# Patient Record
Sex: MALE | Race: WHITE | NOT HISPANIC OR LATINO | Employment: OTHER | URBAN - METROPOLITAN AREA
[De-identification: names, ages, dates, MRNs, and addresses within clinical notes are randomized per-mention and may not be internally consistent; named-entity substitution may affect disease eponyms.]

---

## 2017-08-02 DIAGNOSIS — G47.00 INSOMNIA, UNSPECIFIED TYPE: ICD-10-CM

## 2017-08-02 RX ORDER — TRAZODONE HYDROCHLORIDE 50 MG/1
TABLET, FILM COATED ORAL
Qty: 30 TABLET | Refills: 0 | Status: SHIPPED | OUTPATIENT
Start: 2017-08-02 | End: 2017-11-17

## 2017-08-02 NOTE — TELEPHONE ENCOUNTER
traZODone (DESYREL) 50 MG tablet      Last Written Prescription Date: 9/2/2016  Last Fill Quantity: 90,  # refills: 3   Last Office Visit with [facility], [facility] or [facility] prescribing provider: 9/1/2016    Prescription approved per [facility] Refill Protocol.    Signed Prescriptions:                        Disp   Refills    traZODone (DESYREL) 50 MG tablet           30 tab*0        Sig: TAKE ONE TABLET BY MOUTH AT BEDTIME AS NEEDED FOR           SLEEP   Authorizing Provider: TIERRA LAND  Ordering User: ROSELYN HENDRIX      Closing encounter - no further actions needed at this time    Roselyn Hendrix RN

## 2017-08-03 DIAGNOSIS — G47.00 INSOMNIA, UNSPECIFIED TYPE: ICD-10-CM

## 2017-08-03 RX ORDER — TRAZODONE HYDROCHLORIDE 50 MG/1
TABLET, FILM COATED ORAL
Qty: 90 TABLET | Refills: 2 | OUTPATIENT
Start: 2017-08-03

## 2017-08-03 NOTE — TELEPHONE ENCOUNTER
Trazodone refilled on 8/2/17.    This is duplicate request.    Med refused.  DANETTE Pa, BSN, RN

## 2017-08-31 DIAGNOSIS — F32.5 MAJOR DEPRESSION IN COMPLETE REMISSION (H): ICD-10-CM

## 2017-08-31 DIAGNOSIS — I10 BENIGN ESSENTIAL HYPERTENSION: ICD-10-CM

## 2017-09-01 RX ORDER — HYDROCHLOROTHIAZIDE 25 MG/1
TABLET ORAL
Qty: 90 TABLET | Refills: 0 | Status: SHIPPED | OUTPATIENT
Start: 2017-09-01 | End: 2017-11-17

## 2017-09-01 RX ORDER — ATENOLOL 50 MG/1
TABLET ORAL
Qty: 90 TABLET | Refills: 0 | Status: SHIPPED | OUTPATIENT
Start: 2017-09-01 | End: 2017-11-17

## 2017-09-01 RX ORDER — LOSARTAN POTASSIUM 100 MG/1
TABLET ORAL
Qty: 90 TABLET | Refills: 0 | Status: SHIPPED | OUTPATIENT
Start: 2017-09-01 | End: 2017-11-17

## 2017-09-01 RX ORDER — DILTIAZEM HYDROCHLORIDE 300 MG/1
CAPSULE, EXTENDED RELEASE ORAL
Qty: 90 CAPSULE | Refills: 0 | Status: SHIPPED | OUTPATIENT
Start: 2017-09-01 | End: 2017-11-17

## 2017-09-01 NOTE — TELEPHONE ENCOUNTER
Routing refill request to provider for review/approval because:  Labs not current:  Last labs 5/6/2014  Patient needs to be seen because it has been more than 1 year since last office visit.

## 2017-09-01 NOTE — TELEPHONE ENCOUNTER
Medication Detail      Disp Refills Start End RAFFY   losartan (COZAAR) 100 MG tablet 90 tablet 3 9/2/2016  No   Sig: Take 1 tablet (100 mg) by mouth daily       Last Office Visit with [facility] or [facility] prescribing provider: 9-1-16       No results found for: POTASSIUM  No results found for: CR  BP Readings from Last 3 Encounters:   09/01/16 142/87         Medication Detail      Disp Refills Start End RAFFY   atenolol (TENORMIN) 50 MG tablet 90 tablet 3 9/2/2016  No   Sig: Take 1 tablet (50 mg) by mouth sumanth         Last Office Visit with [facility], [facility] or [facility] prescribing provider:  9-1-16   Future Office Visit:        BP Readings from Last 3 Encounters:   09/01/16 142/87         Medication Detail      Disp Refills Start End RAFFY   sertraline (ZOLOFT) 50 MG tablet 90 tablet 3 9/2/2016  No   Sig: Take 1 tablet (50 mg) by mouth daily     Last Office Visit with [facility] primary care provider:  9-1-16        Last PHQ-9 score on record= No flowsheet data found.          Medication Detail      Disp Refills Start End RAFFY   hydrochlorothiazide (HYDRODIURIL) 25 MG tablet 90 tablet 3 9/2/2016  No   Sig: Take 1 tablet (25 mg) by mouth daily       Last Office Visit with [facility] or [facility] prescribing provider: 9-1-17       No results found for: POTASSIUM  No results found for: CR  BP Readings from Last 3 Encounters:   09/01/16 142/87           Medication Detail      Disp Refills Start End RAFFY   diltiazem (TIAZAC) 300 MG 24 hr ER beaded capsule 90 capsule 3 9/2/2016  No   Sig: Take 1 capsule (300 mg) by mouth daily        Last Office Visit with [facility], [facility] or  [facility] prescribing provider:  9-1-17   Future Office Visit:      BP Readings from Last 3 Encounters:   09/01/16 142/87     No results found for: ALT  Lab Results   Component Value Date    CHOL 228 05/06/2014     Lab Results   Component Value Date    HDL 45 05/06/2014     Lab Results   Component Value Date     05/06/2014     Lab Results   Component Value Date    TRIG 291  05/06/2014     No results found for: STEPHANIA

## 2017-11-17 ENCOUNTER — OFFICE VISIT (OUTPATIENT)
Dept: FAMILY MEDICINE | Facility: CLINIC | Age: 62
End: 2017-11-17

## 2017-11-17 VITALS
HEIGHT: 68 IN | BODY MASS INDEX: 28.4 KG/M2 | RESPIRATION RATE: 18 BRPM | WEIGHT: 187.4 LBS | SYSTOLIC BLOOD PRESSURE: 152 MMHG | TEMPERATURE: 97.8 F | DIASTOLIC BLOOD PRESSURE: 91 MMHG

## 2017-11-17 DIAGNOSIS — Z12.5 SCREENING FOR PROSTATE CANCER: ICD-10-CM

## 2017-11-17 DIAGNOSIS — Z23 NEED FOR PROPHYLACTIC VACCINATION AND INOCULATION AGAINST INFLUENZA: ICD-10-CM

## 2017-11-17 DIAGNOSIS — N52.9 ERECTILE DYSFUNCTION, UNSPECIFIED ERECTILE DYSFUNCTION TYPE: ICD-10-CM

## 2017-11-17 DIAGNOSIS — I10 BENIGN ESSENTIAL HYPERTENSION: ICD-10-CM

## 2017-11-17 DIAGNOSIS — E78.5 HYPERLIPIDEMIA LDL GOAL <100: ICD-10-CM

## 2017-11-17 DIAGNOSIS — H53.8 BLURRED VISION: ICD-10-CM

## 2017-11-17 DIAGNOSIS — Z00.00 ROUTINE GENERAL MEDICAL EXAMINATION AT A HEALTH CARE FACILITY: Primary | ICD-10-CM

## 2017-11-17 DIAGNOSIS — Z13.1 SCREENING FOR DIABETES MELLITUS: ICD-10-CM

## 2017-11-17 DIAGNOSIS — F32.5 MAJOR DEPRESSION IN COMPLETE REMISSION (H): ICD-10-CM

## 2017-11-17 DIAGNOSIS — G47.00 INSOMNIA, UNSPECIFIED TYPE: ICD-10-CM

## 2017-11-17 DIAGNOSIS — Z11.59 NEED FOR HEPATITIS C SCREENING TEST: ICD-10-CM

## 2017-11-17 LAB — HBA1C MFR BLD: 5.4 % (ref 4.3–6)

## 2017-11-17 PROCEDURE — 83036 HEMOGLOBIN GLYCOSYLATED A1C: CPT | Performed by: FAMILY MEDICINE

## 2017-11-17 PROCEDURE — 90686 IIV4 VACC NO PRSV 0.5 ML IM: CPT | Performed by: FAMILY MEDICINE

## 2017-11-17 PROCEDURE — 36415 COLL VENOUS BLD VENIPUNCTURE: CPT | Performed by: FAMILY MEDICINE

## 2017-11-17 PROCEDURE — 99207 C PAF COMPLETED  NO CHARGE: CPT | Performed by: FAMILY MEDICINE

## 2017-11-17 PROCEDURE — 90471 IMMUNIZATION ADMIN: CPT | Performed by: FAMILY MEDICINE

## 2017-11-17 PROCEDURE — 86803 HEPATITIS C AB TEST: CPT | Performed by: FAMILY MEDICINE

## 2017-11-17 PROCEDURE — 99207 C PAF COMPLETED  NO CHARGE: CPT | Mod: 25 | Performed by: FAMILY MEDICINE

## 2017-11-17 PROCEDURE — 80061 LIPID PANEL: CPT | Performed by: FAMILY MEDICINE

## 2017-11-17 PROCEDURE — 80053 COMPREHEN METABOLIC PANEL: CPT | Performed by: FAMILY MEDICINE

## 2017-11-17 PROCEDURE — G0103 PSA SCREENING: HCPCS | Performed by: FAMILY MEDICINE

## 2017-11-17 PROCEDURE — 99396 PREV VISIT EST AGE 40-64: CPT | Mod: 25 | Performed by: FAMILY MEDICINE

## 2017-11-17 RX ORDER — TRAZODONE HYDROCHLORIDE 50 MG/1
TABLET, FILM COATED ORAL
Qty: 30 TABLET | Refills: 0 | Status: CANCELLED | OUTPATIENT
Start: 2017-11-17

## 2017-11-17 RX ORDER — DILTIAZEM HYDROCHLORIDE 300 MG/1
300 CAPSULE, EXTENDED RELEASE ORAL DAILY
Qty: 90 CAPSULE | Refills: 3 | Status: SHIPPED | OUTPATIENT
Start: 2017-11-17 | End: 2018-11-19

## 2017-11-17 RX ORDER — LOSARTAN POTASSIUM 100 MG/1
100 TABLET ORAL DAILY
Qty: 90 TABLET | Refills: 3 | Status: SHIPPED | OUTPATIENT
Start: 2017-11-17 | End: 2018-11-19

## 2017-11-17 RX ORDER — SIMVASTATIN 10 MG
10 TABLET ORAL
Qty: 30 TABLET | Status: CANCELLED | OUTPATIENT
Start: 2017-11-17

## 2017-11-17 RX ORDER — SIMVASTATIN 10 MG
10 TABLET ORAL
COMMUNITY
Start: 2017-05-23 | End: 2017-11-17

## 2017-11-17 RX ORDER — HYDROCHLOROTHIAZIDE 25 MG/1
25 TABLET ORAL DAILY
Qty: 90 TABLET | Refills: 3 | Status: SHIPPED | OUTPATIENT
Start: 2017-11-17 | End: 2018-11-19

## 2017-11-17 RX ORDER — TRAZODONE HYDROCHLORIDE 50 MG/1
50 TABLET, FILM COATED ORAL AT BEDTIME
Qty: 90 TABLET | Refills: 3 | Status: SHIPPED | OUTPATIENT
Start: 2017-11-17 | End: 2018-07-26

## 2017-11-17 RX ORDER — ATENOLOL 50 MG/1
50 TABLET ORAL DAILY
Qty: 90 TABLET | Refills: 3 | Status: SHIPPED | OUTPATIENT
Start: 2017-11-17 | End: 2018-11-19

## 2017-11-17 RX ORDER — LOSARTAN POTASSIUM 100 MG/1
100 TABLET ORAL
COMMUNITY
Start: 2016-08-02 | End: 2017-11-17

## 2017-11-17 RX ORDER — SIMVASTATIN 10 MG
10 TABLET ORAL AT BEDTIME
Qty: 90 TABLET | Refills: 3 | Status: SHIPPED | OUTPATIENT
Start: 2017-11-17 | End: 2018-11-19

## 2017-11-17 RX ORDER — SILDENAFIL 50 MG/1
25-100 TABLET, FILM COATED ORAL DAILY PRN
Qty: 6 TABLET | Refills: 1 | Status: SHIPPED | OUTPATIENT
Start: 2017-11-17 | End: 2018-11-19

## 2017-11-17 NOTE — PROGRESS NOTES

## 2017-11-17 NOTE — PROGRESS NOTES
SUBJECTIVE:   CC: Ming Garnett is an 61 year old male who presents for preventative health visit.     Physical   Annual:     Getting at least 3 servings of Calcium per day::  Yes    Bi-annual eye exam::  Yes    Dental care twice a year::  Yes    Sleep apnea or symptoms of sleep apnea::  None    Taking medications regularly::  No    Barriers to taking medications::  None    Additional concerns today::  No    Physical Activity: Just bought a house. He is very active fixing that. Not swimming so much right now.  Nutrition: Tries to eat the best he can.       Today's PHQ-2 Score:   PHQ-2 ( 1999 [employer]) 11/17/2017   Q1: Little interest or pleasure in doing things 0   Q2: Feeling down, depressed or hopeless 0   PHQ-2 Score 0   Q1: Little interest or pleasure in doing things Not at all   Q2: Feeling down, depressed or hopeless Not at all   PHQ-2 Score 0       Abuse: Current or Past(Physical, Sexual or Emotional)- No  Do you feel safe in your environment - Yes    Social History   Substance Use Topics     Smoking status: Current Some Day Smoker     Packs/day: 0.50     Types: Cigarettes     Smokeless tobacco: Never Used     Alcohol use 0.0 oz/week     0 Standard drinks or equivalent per week          Standardized Alcohol Screening Questionnaire  AUDIT   Questions 0 1 2 3 4 Score   1. How often do you have a drink  containing alcohol? Never Monthly or less 2 to 4  times a  month 2 to 3  times a  week 4 or more  times a  week  4   2. How many drinks containing alcohol  do you have on a typical day when you are drinking? 1 or 2 3 or 4 5 or 6 7 to 9 10 or more  0   3. How often do you have more than five  or more drinks on one occasion? Never Less  than  monthly Monthly Weekly Daily or  almost  daily  0   4. How often during the last year have  you found that you were not able to stop drinking once you had started? Never Less  than  monthly Monthly Weekly Daily or  almost  daily    0   5. How often during the last year  have  you failed to do what was normally expected of you because of drinking? Never Less  than  monthly Monthly Weekly Daily or  almost  daily  0   6. How often during the last year have  you needed a first drink in the morning to get yourself going after a heavy drinking session? Never Less  than  monthly Monthly Weekly Daily or  almost  daily 0   7. How often during the last year have you had a feeling of guilt or remorse after drinking? Never Less  than  monthly Monthly Weekly Daily or  almost  daily  0   8. How often during the last year have  you been unable to remember what happened the night before because of your drinking? Never Less  than  monthly Monthly Weekly Daily or  almost  daily  0   9. Have you or someone else been  injured because of your drinking? No  Yes, but not in the last year  Yes,  during the  last year  0   10. Has a relative, friend, doctor or other health care worker been concerned about your drinking or suggested you cut down? No  Yes, but not in the last year  Yes,  during the  last year  4   Total  8   Scoring: A score of 7 for adult men is an indication of hazardous drinking (risk for physical or physiological harm); a score of 8 or more is an indication of an alcohol use disorder. A score of 5 or more for adult women  is an indication of hazardous drinking or an alcohol use disorder.         Last PSA: No results found for: PSA      Review of Systems  C: NEGATIVE for fever, chills, change in weight  I: NEGATIVE for worrisome rashes, moles or lesions  E: NEGATIVE for vision changes or irritation  ENT: NEGATIVE for ear, mouth and throat problems  R: NEGATIVE for significant cough or SOB  CV: NEGATIVE for chest pain, palpitations or peripheral edema  GI: NEGATIVE for nausea, abdominal pain, or change in bowel habits - occasional diarrhea, occasional heartburn   male: negative for dysuria, hematuria, decreased urinary stream, erectile dysfunction, urethral discharge  M: Ongoing knee  "ache. Sees a chiropractor for shoulder, back, knee, and hand discomfort. Coping OK.  N: NEGATIVE for weakness, dizziness. Some tingling in right arm that responds to exercises - attributed to pinched nerve in neck.  P: NEGATIVE for changes in mood or affect    OBJECTIVE:   BP (!) 152/91  Temp 97.8  F (36.6  C) (Oral)  Resp 18  Ht 5' 7.5\" (1.715 m)  Wt 187 lb 6.4 oz (85 kg)  BMI 28.92 kg/m2    Physical Exam  GENERAL: healthy, alert and no distress  EYES: Eyes grossly normal to inspection, PERRL and conjunctivae and sclerae normal  HENT: ear canals and TM's normal, nose and mouth without ulcers or lesions  NECK: no adenopathy, no asymmetry, masses, or scars and thyroid normal to palpation  RESP: lungs clear to auscultation - no rales, rhonchi or wheezes  CV: regular rate and rhythm, normal S1 S2, no S3 or S4, no murmur, click or rub, no peripheral edema and peripheral pulses strong  ABDOMEN: soft, nontender, no hepatosplenomegaly, no masses and bowel sounds normal  MS: no gross musculoskeletal defects noted, no edema  SKIN: no suspicious lesions or rashes  NEURO: Normal strength and tone, mentation intact and speech normal  PSYCH: mentation appears normal, affect normal/bright    ASSESSMENT/PLAN:   Ming was seen today for physical and flu shot.    Diagnoses and all orders for this visit:    Routine general medical examination at a health care facility    Insomnia, unspecified type  -     traZODone (DESYREL) 50 MG tablet; Take 1 tablet (50 mg) by mouth At Bedtime    Benign essential hypertension  -     hydrochlorothiazide (HYDRODIURIL) 25 MG tablet; Take 1 tablet (25 mg) by mouth daily  -     diltiazem (TIAZAC) 300 MG 24 hr ER beaded capsule; Take 1 capsule (300 mg) by mouth daily  -     atenolol (TENORMIN) 50 MG tablet; Take 1 tablet (50 mg) by mouth daily  -     losartan (COZAAR) 100 MG tablet; Take 1 tablet (100 mg) by mouth daily  -     Comprehensive metabolic panel (BMP + Alb, Alk Phos, ALT, AST, Total. " "Evelyn, PAT)    Major depression in complete remission (H)  -     sertraline (ZOLOFT) 50 MG tablet; Take 1 tablet (50 mg) by mouth daily    Erectile dysfunction, unspecified erectile dysfunction type  -     sildenafil (VIAGRA) 50 MG tablet; Take 0.5-2 tablets ( mg) by mouth daily as needed Take 30 min to 4 hours before intercourse.  Never use with nitroglycerin, terazosin or doxazosin.    Hyperlipidemia LDL goal <100  -     Lipid panel reflex to direct LDL Fasting  -     simvastatin (ZOCOR) 10 MG tablet; Take 1 tablet (10 mg) by mouth At Bedtime    Screening for prostate cancer  -     PSA, screen    Need for hepatitis C screening test  -     Hepatitis C antibody    Blurred vision    Screening for diabetes mellitus  -     Hemoglobin A1c    Need for prophylactic vaccination and inoculation against influenza  -     FLU VAC, SPLIT VIRUS IM > 3 YO (QUADRIVALENT) [35878]  -     Vaccine Administration, Initial [54173]    Other orders  -     Cancel: GLUCOSE  -     Cancel: simvastatin (ZOCOR) 10 MG tablet; Take 1 tablet (10 mg) by mouth  -     Cancel: traZODone (DESYREL) 50 MG tablet;   -     PAF COMPLETED        COUNSELING:   Reviewed preventive health counseling, as reflected in patient instructions       Regular exercise       Healthy diet/nutrition     Recheck BP in 1 month. This could be a nurse only visit.  Will request colonoscopy.       reports that he has been smoking Cigarettes.  He has been smoking about 0.50 packs per day. He has never used smokeless tobacco.    Estimated body mass index is 28.92 kg/(m^2) as calculated from the following:    Height as of this encounter: 5' 7.5\" (1.715 m).    Weight as of this encounter: 187 lb 6.4 oz (85 kg).       Counseling Resources:  ATP IV Guidelines  Pooled Cohorts Equation Calculator  FRAX Risk Assessment  ICSI Preventive Guidelines  Dietary Guidelines for Americans, 2010  [employer] MyPlate  ASA Prophylaxis  Lung CA Screening    Jarrett Jerome MD  [facility]  Answers for HPI/ROS submitted by the patient on 11/17/2017   PHQ-2 Score: 0

## 2017-11-17 NOTE — LETTER
November 21, 2017      Ming Garnett  [address] [place] [address]        Dear ,    We are writing to inform you of your test results.    I am writing to share your lab results from your recent visit at our office. They are all in a fair range. Your creatinine which is a kidney function test is a bit elevated. It is prudent to consider retesting this in the next 1-6 months to be sure it is stable - on the sooner end if you are not feeling well.    The cholesterol results are most useful when factored into the risk score noted below. A risk score below 10% indicates that you are in a good range and that we should not add any medications relating to heart health or cholesterol levels. It is always a good idea to be attentive to physical activity and nutrition as we discussed. It is true that some of the cholesterol levels appear out of range when compared to the lab reference ranges, but this is no longer an accurate way of interpreting cholesterol levels.    We should consider adding aspirin 81mg daily to your medications as a result.    The 10-year ASCVD risk score (Bry KALEB Jr, et al., 2013) is: 25.2%    Values used to calculate the score:      Age: 61 years      Sex: Male      Is Non- : No      Diabetic: No      Tobacco smoker: Yes      Systolic Blood Pressure: 152 mmHg      Is BP treated: Yes      HDL Cholesterol: 65 mg/dL      Total Cholesterol: 263 mg/dL     Resulted Orders   PSA, screen   Result Value Ref Range    PSA 2.31 0 - 4 ug/L      Comment:      Assay Method:  Chemiluminescence using Siemens Vista analyzer   Lipid panel reflex to direct LDL Fasting   Result Value Ref Range    Cholesterol 263 (H) <200 mg/dL      Comment:      Desirable:       <200 mg/dl    Triglycerides 159 (H) <150 mg/dL      Comment:      Borderline high:  150-199 mg/dl  High:             200-499 mg/dl  Very high:       >499 mg/dl  Fasting specimen      HDL Cholesterol 65 >39 mg/dL    LDL  Cholesterol Calculated 166 (H) <100 mg/dL      Comment:      Above desirable:  100-129 mg/dl  Borderline High:  130-159 mg/dL  High:             160-189 mg/dL  Very high:       >189 mg/dl      Non HDL Cholesterol 198 (H) <130 mg/dL      Comment:      Above Desirable:  130-159 mg/dl  Borderline high:  160-189 mg/dl  High:             190-219 mg/dl  Very high:       >219 mg/dl     Hepatitis C antibody   Result Value Ref Range    Hepatitis C Antibody Nonreactive NR^Nonreactive      Comment:      Assay performance characteristics have not been established for newborns,   infants, and children     Hemoglobin A1c   Result Value Ref Range    Hemoglobin A1C 5.4 4.3 - 6.0 %   Comprehensive metabolic panel (BMP + Alb, Alk Phos, ALT, AST, Total. Bili, TP)   Result Value Ref Range    Sodium 137 133 - 144 mmol/L    Potassium 4.2 3.4 - 5.3 mmol/L    Chloride 104 94 - 109 mmol/L    Carbon Dioxide 25 20 - 32 mmol/L    Anion Gap 8 3 - 14 mmol/L    Glucose 94 70 - 99 mg/dL      Comment:      Fasting specimen    Urea Nitrogen 23 7 - 30 mg/dL    Creatinine 1.29 (H) 0.66 - 1.25 mg/dL    GFR Estimate 56 (L) >60 mL/min/1.7m2      Comment:      Non  GFR Calc    GFR Estimate If Black 68 >60 mL/min/1.7m2      Comment:       GFR Calc    Calcium 9.8 8.5 - 10.1 mg/dL    Bilirubin Total 0.6 0.2 - 1.3 mg/dL    Albumin 4.3 3.4 - 5.0 g/dL    Protein Total 8.2 6.8 - 8.8 g/dL    Alkaline Phosphatase 96 40 - 150 U/L    ALT 35 0 - 70 U/L    AST 27 0 - 45 U/L       If you have any questions or concerns, please call the clinic at the number listed above.       Sincerely,        Jarrett Jerome MD/nr

## 2017-11-17 NOTE — Clinical Note
Please request records from [facility] for past colonoscopy. Records request already on file.  Jarrett Jerome MD Family Medicine Physician

## 2017-11-17 NOTE — MR AVS SNAPSHOT
After Visit Summary   11/17/2017    Ming Garnett    MRN: [id number]           Patient Information     Date Of Birth          1955        Visit Information        Provider Department      11/17/2017 1:00 PM Jarrett Montague MD [facility]        Today's Diagnoses     Hyperlipidemia LDL goal <100    -  1    Insomnia, unspecified type        Benign essential hypertension        Major depression in complete remission (H)        Erectile dysfunction, unspecified erectile dysfunction type        Screening for prostate cancer        Need for hepatitis C screening test        Blurred vision        Screening for diabetes mellitus        Need for prophylactic vaccination and inoculation against influenza          Care Instructions      Preventive Health Recommendations  Male Ages 50 - 64    Yearly exam:             See your health care provider every year in order to  o   Review health changes.   o   Discuss preventive care.    o   Review your medicines if your doctor has prescribed any.     Have a cholesterol test every 5 years, or more frequently if you are at risk for high cholesterol/heart disease.     Have a diabetes test (fasting glucose) every three years. If you are at risk for diabetes, you should have this test more often.     Have a colonoscopy at age 50, or have a yearly FIT test (stool test). These exams will check for colon cancer.      Talk with your health care provider about whether or not a prostate cancer screening test (PSA) is right for you.    You should be tested each year for STDs (sexually transmitted diseases), if you re at risk.     Shots: Get a flu shot each year. Get a tetanus shot every 10 years.     Nutrition:    Eat at least 5 servings of fruits and vegetables daily.     Eat whole-grain bread, whole-wheat pasta and brown rice instead of white grains and rice.     Talk to your provider about Calcium and Vitamin D.     Lifestyle    Exercise  "for at least 150 minutes a week (30 minutes a day, 5 days a week). This will help you control your weight and prevent disease.     Limit alcohol to one drink per day.     No smoking.     Wear sunscreen to prevent skin cancer.     See your dentist every six months for an exam and cleaning.     See your eye doctor every 1 to 2 years.  Recheck BP in 1 month. This could be a nurse only visit.  Will request colonoscopy.          Follow-ups after your visit        Who to contact     If you have questions or need follow up information about today's clinic visit or your schedule please contact [facility] directly at [phone].  Normal or non-critical lab and imaging results will be communicated to you by [employer], letter or phone within 4 business days after the clinic has received the results. If you do not hear from us within 7 days, please contact the clinic through [employer] or phone. If you have a critical or abnormal lab result, we will notify you by phone as soon as possible.  Submit refill requests through [employer] or call your pharmacy and they will forward the refill request to us. Please allow 3 business days for your refill to be completed.          Additional Information About Your Visit        [employer] Information     [employer] lets you send messages to your doctor, view your test results, renew your prescriptions, schedule appointments and more. To sign up, go to [email or web address] . Click on \"Log in\" on the left side of the screen, which will take you to the Welcome page. Then click on \"Sign up Now\" on the right side of the page.     You will be asked to enter the access code listed below, as well as some personal information. Please follow the directions to create your username and password.     Your access code is: [email or web address]  Expires: 2/15/2018  1:56 PM     Your access code will  in 90 days. If you need help or a new code, please call your [facility] or [phone].      " "  Care EveryWhere ID     This is your Care EveryWhere ID. This could be used by other organizations to access your [facility] medical records  [id number]        Your Vitals Were     Temperature Respirations Height BMI (Body Mass Index)          97.8  F (36.6  C) (Oral) 18 5' 7.5\" (1.715 m) 28.92 kg/m2         Blood Pressure from Last 3 Encounters:   11/17/17 (!) 152/91   09/01/16 142/87    Weight from Last 3 Encounters:   11/17/17 187 lb 6.4 oz (85 kg)   09/01/16 196 lb (88.9 kg)              We Performed the Following     Comprehensive metabolic panel (BMP + Alb, Alk Phos, ALT, AST, Total. Bili, TP)     FLU VAC, SPLIT VIRUS IM > 3 YO (QUADRIVALENT) [92570]     Hemoglobin A1c     Hepatitis C antibody     Lipid panel reflex to direct LDL Fasting     PSA, screen     Vaccine Administration, Initial [74015]          Today's Medication Changes          These changes are accurate as of: 11/17/17  1:56 PM.  If you have any questions, ask your nurse or doctor.               These medicines have changed or have updated prescriptions.        Dose/Directions    atenolol 50 MG tablet   Commonly known as:  TENORMIN   This may have changed:  See the new instructions.   Used for:  Benign essential hypertension   Changed by:  Jarrett Montague MD        Dose:  50 mg   Take 1 tablet (50 mg) by mouth daily   Quantity:  90 tablet   Refills:  3       diltiazem 300 MG 24 hr ER beaded capsule   Commonly known as:  TIAZAC   This may have changed:  See the new instructions.   Used for:  Benign essential hypertension   Changed by:  Jarrett Montague MD        Dose:  300 mg   Take 1 capsule (300 mg) by mouth daily   Quantity:  90 capsule   Refills:  3       hydrochlorothiazide 25 MG tablet   Commonly known as:  HYDRODIURIL   This may have changed:  See the new instructions.   Used for:  Benign essential hypertension   Changed by:  Jarrett Montague MD        Dose:  25 mg   Take 1 tablet (25 mg) by mouth " daily   Quantity:  90 tablet   Refills:  3       losartan 100 MG tablet   Commonly known as:  COZAAR   This may have changed:  See the new instructions.   Used for:  Benign essential hypertension   Changed by:  Jarrett Montague MD        Dose:  100 mg   Take 1 tablet (100 mg) by mouth daily   Quantity:  90 tablet   Refills:  3       sertraline 50 MG tablet   Commonly known as:  ZOLOFT   This may have changed:  See the new instructions.   Used for:  Major depression in complete remission (H)   Changed by:  Jarrett Montague MD        Dose:  50 mg   Take 1 tablet (50 mg) by mouth daily   Quantity:  90 tablet   Refills:  3       sildenafil 50 MG tablet   Commonly known as:  VIAGRA   This may have changed:  reasons to take this   Used for:  Erectile dysfunction, unspecified erectile dysfunction type   Changed by:  Jarrett Montague MD        Dose:   mg   Take 0.5-2 tablets ( mg) by mouth daily as needed Take 30 min to 4 hours before intercourse.  Never use with nitroglycerin, terazosin or doxazosin.   Quantity:  6 tablet   Refills:  1       simvastatin 10 MG tablet   Commonly known as:  ZOCOR   This may have changed:  when to take this   Used for:  Hyperlipidemia LDL goal <100   Changed by:  Jarrett Montague MD        Dose:  10 mg   Take 1 tablet (10 mg) by mouth At Bedtime   Quantity:  90 tablet   Refills:  3       traZODone 50 MG tablet   Commonly known as:  DESYREL   This may have changed:  See the new instructions.   Used for:  Insomnia, unspecified type   Changed by:  Jarrett Montague MD        Dose:  50 mg   Take 1 tablet (50 mg) by mouth At Bedtime   Quantity:  90 tablet   Refills:  3            Where to get your medicines      These medications were sent to [id number] - [place], [place] - [address] [facility]  [address], [place] [address]     Phone:  [phone]     atenolol 50 MG tablet    diltiazem 300 MG 24  hr ER beaded capsule    hydrochlorothiazide 25 MG tablet    losartan 100 MG tablet    sertraline 50 MG tablet    simvastatin 10 MG tablet    traZODone 50 MG tablet         Some of these will need a paper prescription and others can be bought over the counter.  Ask your nurse if you have questions.     Bring a paper prescription for each of these medications     sildenafil 50 MG tablet                Primary Care Provider Office Phone # Fax #    Jarrett Pandey Dom Jerome [phone]       [address] [facility] [address]        Equal Access to Services     LIZZY STANLEY : Hadii aad ku hadasho Soomaali, waaxda luqadaha, qaybta kaalmada adeegyada, waxay idiin hayaan adebianca grijalva . So [facility] [phone].    ATENCIÓN: Si habla español, tiene a mao disposición servicios gratuitos de asistencia lingüística. [facility] [phone].    We comply with applicable federal civil rights laws and [place] laws. We do not discriminate on the basis of race, color, national origin, age, disability, sex, sexual orientation, or gender identity.            Thank you!     Thank you for choosing [facility]  for your care. Our goal is always to provide you with excellent care. Hearing back from our patients is one way we can continue to improve our services. Please take a few minutes to complete the written survey that you may receive in the mail after your visit with us. Thank you!             Your Updated Medication List - Protect others around you: Learn how to safely use, store and throw away your medicines at www.disposemymeds.org.          This list is accurate as of: 11/17/17  1:56 PM.  Always use your most recent med list.                   Brand Name Dispense Instructions for use Diagnosis    atenolol 50 MG tablet    TENORMIN    90 tablet    Take 1 tablet (50 mg) by mouth daily    Benign essential hypertension       diltiazem 300 MG 24 hr ER beaded capsule    TIAZAC    90 capsule     Take 1 capsule (300 mg) by mouth daily    Benign essential hypertension       hydrochlorothiazide 25 MG tablet    HYDRODIURIL    90 tablet    Take 1 tablet (25 mg) by mouth daily    Benign essential hypertension       losartan 100 MG tablet    COZAAR    90 tablet    Take 1 tablet (100 mg) by mouth daily    Benign essential hypertension       sertraline 50 MG tablet    ZOLOFT    90 tablet    Take 1 tablet (50 mg) by mouth daily    Major depression in complete remission (H)       sildenafil 50 MG tablet    VIAGRA    6 tablet    Take 0.5-2 tablets ( mg) by mouth daily as needed Take 30 min to 4 hours before intercourse.  Never use with nitroglycerin, terazosin or doxazosin.    Erectile dysfunction, unspecified erectile dysfunction type       simvastatin 10 MG tablet    ZOCOR    90 tablet    Take 1 tablet (10 mg) by mouth At Bedtime    Hyperlipidemia LDL goal <100       traZODone 50 MG tablet    DESYREL    90 tablet    Take 1 tablet (50 mg) by mouth At Bedtime    Insomnia, unspecified type

## 2017-11-17 NOTE — NURSING NOTE
"Chief Complaint   Patient presents with     Physical       Initial There were no vitals taken for this visit. Estimated body mass index is 29.16 kg/(m^2) as calculated from the following:    Height as of 9/1/16: 5' 8.75\" (1.746 m).    Weight as of 9/1/16: 196 lb (88.9 kg).  Medication Reconciliation: unable or not appropriate to perform       Jennifer Lacey MA    "

## 2017-11-17 NOTE — PATIENT INSTRUCTIONS
Preventive Health Recommendations  Male Ages 50 - 64    Yearly exam:             See your health care provider every year in order to  o   Review health changes.   o   Discuss preventive care.    o   Review your medicines if your doctor has prescribed any.     Have a cholesterol test every 5 years, or more frequently if you are at risk for high cholesterol/heart disease.     Have a diabetes test (fasting glucose) every three years. If you are at risk for diabetes, you should have this test more often.     Have a colonoscopy at age 50, or have a yearly FIT test (stool test). These exams will check for colon cancer.      Talk with your health care provider about whether or not a prostate cancer screening test (PSA) is right for you.    You should be tested each year for STDs (sexually transmitted diseases), if you re at risk.     Shots: Get a flu shot each year. Get a tetanus shot every 10 years.     Nutrition:    Eat at least 5 servings of fruits and vegetables daily.     Eat whole-grain bread, whole-wheat pasta and brown rice instead of white grains and rice.     Talk to your provider about Calcium and Vitamin D.     Lifestyle    Exercise for at least 150 minutes a week (30 minutes a day, 5 days a week). This will help you control your weight and prevent disease.     Limit alcohol to one drink per day.     No smoking.     Wear sunscreen to prevent skin cancer.     See your dentist every six months for an exam and cleaning.     See your eye doctor every 1 to 2 years.  Recheck BP in 1 month. This could be a nurse only visit.  Will request colonoscopy.

## 2017-11-19 LAB
ALBUMIN SERPL-MCNC: 4.3 G/DL (ref 3.4–5)
ALP SERPL-CCNC: 96 U/L (ref 40–150)
ALT SERPL W P-5'-P-CCNC: 35 U/L (ref 0–70)
ANION GAP SERPL CALCULATED.3IONS-SCNC: 8 MMOL/L (ref 3–14)
AST SERPL W P-5'-P-CCNC: 27 U/L (ref 0–45)
BILIRUB SERPL-MCNC: 0.6 MG/DL (ref 0.2–1.3)
BUN SERPL-MCNC: 23 MG/DL (ref 7–30)
CALCIUM SERPL-MCNC: 9.8 MG/DL (ref 8.5–10.1)
CHLORIDE SERPL-SCNC: 104 MMOL/L (ref 94–109)
CHOLEST SERPL-MCNC: 263 MG/DL
CO2 SERPL-SCNC: 25 MMOL/L (ref 20–32)
CREAT SERPL-MCNC: 1.29 MG/DL (ref 0.66–1.25)
GFR SERPL CREATININE-BSD FRML MDRD: 56 ML/MIN/1.7M2
GLUCOSE SERPL-MCNC: 94 MG/DL (ref 70–99)
HDLC SERPL-MCNC: 65 MG/DL
LDLC SERPL CALC-MCNC: 166 MG/DL
NONHDLC SERPL-MCNC: 198 MG/DL
POTASSIUM SERPL-SCNC: 4.2 MMOL/L (ref 3.4–5.3)
PROT SERPL-MCNC: 8.2 G/DL (ref 6.8–8.8)
PSA SERPL-ACNC: 2.31 UG/L (ref 0–4)
SODIUM SERPL-SCNC: 137 MMOL/L (ref 133–144)
TRIGL SERPL-MCNC: 159 MG/DL

## 2017-11-20 LAB — HCV AB SERPL QL IA: NONREACTIVE

## 2018-06-25 ENCOUNTER — TELEPHONE (OUTPATIENT)
Dept: FAMILY MEDICINE | Facility: CLINIC | Age: 63
End: 2018-06-25

## 2018-06-25 NOTE — TELEPHONE ENCOUNTER
LVM to schedule office visit with SHW for HTN. He has openings on Friday and Monday.Mary Azar, NA/R  Please schedule.

## 2018-07-26 DIAGNOSIS — G47.00 INSOMNIA, UNSPECIFIED TYPE: ICD-10-CM

## 2018-07-27 NOTE — TELEPHONE ENCOUNTER
"Requested Prescriptions   Pending Prescriptions Disp Refills     traZODone (DESYREL) 50 MG tablet [Pharmacy Med Name: TraZODone HCl Oral Tablet 50 MG]  Last Written Prescription Date:  11-17-17  Last Fill Quantity: 90 tab,  # refills: 3   Last office visit: 11/17/2017 with prescribing provider:  Cisco Montague    Future Office Visit:    90 tablet 2     Sig: TAKE ONE TABLET BY MOUTH AT BEDTIME    Serotonin Modulators Passed    7/26/2018  8:07 AM       Passed - Recent (12 mo) or future (30 days) visit within the authorizing provider's specialty    Patient had office visit in the last 12 months or has a visit in the next 30 days with authorizing provider or within the authorizing provider's specialty.  See \"Patient Info\" tab in inbasket, or \"Choose Columns\" in Meds & Orders section of the refill encounter.           Passed - Patient is age 18 or older          "

## 2018-08-01 RX ORDER — TRAZODONE HYDROCHLORIDE 50 MG/1
TABLET, FILM COATED ORAL
Qty: 90 TABLET | Refills: 0 | Status: SHIPPED | OUTPATIENT
Start: 2018-08-01 | End: 2018-11-04

## 2018-08-01 NOTE — TELEPHONE ENCOUNTER
Refilled for 90 more days per MD intention when he wrote this rx for one year Nov 2017    Gisela Moore, RN, BSN

## 2018-08-09 DIAGNOSIS — G47.00 INSOMNIA, UNSPECIFIED TYPE: ICD-10-CM

## 2018-08-09 RX ORDER — TRAZODONE HYDROCHLORIDE 50 MG/1
TABLET, FILM COATED ORAL
Qty: 90 TABLET | Refills: 0 | Status: SHIPPED | OUTPATIENT
Start: 2018-08-09 | End: 2018-11-19

## 2018-08-09 NOTE — TELEPHONE ENCOUNTER
"Requested Prescriptions   Pending Prescriptions Disp Refills     traZODone (DESYREL) 50 MG tablet [Pharmacy Med Name: TraZODone HCl Oral Tablet 50 MG]  Last Written Prescription Date:  8/1/2018  Last Fill Quantity: 90 tablet,  # refills: 0   Last Office Visit: 11/17/2017   Future Office Visit:    90 tablet 2     Sig: TAKE ONE TABLET BY MOUTH AT BEDTIME    Serotonin Modulators Passed    8/9/2018  8:33 AM       Passed - Recent (12 mo) or future (30 days) visit within the authorizing provider's specialty    Patient had office visit in the last 12 months or has a visit in the next 30 days with authorizing provider or within the authorizing provider's specialty.  See \"Patient Info\" tab in inbasket, or \"Choose Columns\" in Meds & Orders section of the refill encounter.           Passed - Patient is age 18 or older          "

## 2018-08-10 NOTE — TELEPHONE ENCOUNTER
Prescription approved per [facility] Refill Protocol.    Signed Prescriptions:                        Disp   Refills    traZODone (DESYREL) 50 MG tablet           90 tab*0        Sig: TAKE ONE TABLET BY MOUTH AT BEDTIME   Authorizing Provider: TIERRA LAND  Ordering User: ROSELYN HENDRIX      Closing encounter - no further actions needed at this time    Roselyn Hendrix RN

## 2018-11-04 DIAGNOSIS — G47.00 INSOMNIA, UNSPECIFIED TYPE: ICD-10-CM

## 2018-11-04 RX ORDER — TRAZODONE HYDROCHLORIDE 50 MG/1
TABLET, FILM COATED ORAL
Qty: 30 TABLET | Refills: 0 | Status: SHIPPED | OUTPATIENT
Start: 2018-11-04 | End: 2018-11-19

## 2018-11-19 ENCOUNTER — OFFICE VISIT (OUTPATIENT)
Dept: FAMILY MEDICINE | Facility: CLINIC | Age: 63
End: 2018-11-19
Payer: COMMERCIAL

## 2018-11-19 VITALS
OXYGEN SATURATION: 98 % | BODY MASS INDEX: 28.79 KG/M2 | RESPIRATION RATE: 16 BRPM | DIASTOLIC BLOOD PRESSURE: 86 MMHG | TEMPERATURE: 98.1 F | SYSTOLIC BLOOD PRESSURE: 146 MMHG | WEIGHT: 190 LBS | HEIGHT: 68 IN | HEART RATE: 58 BPM

## 2018-11-19 DIAGNOSIS — F32.5 MAJOR DEPRESSION IN COMPLETE REMISSION (H): ICD-10-CM

## 2018-11-19 DIAGNOSIS — Z23 NEED FOR PROPHYLACTIC VACCINATION AND INOCULATION AGAINST INFLUENZA: ICD-10-CM

## 2018-11-19 DIAGNOSIS — G47.00 INSOMNIA, UNSPECIFIED TYPE: ICD-10-CM

## 2018-11-19 DIAGNOSIS — I10 BENIGN ESSENTIAL HYPERTENSION: ICD-10-CM

## 2018-11-19 DIAGNOSIS — Z00.00 MEDICARE ANNUAL WELLNESS VISIT, INITIAL: Primary | ICD-10-CM

## 2018-11-19 DIAGNOSIS — E78.5 HYPERLIPIDEMIA LDL GOAL <100: ICD-10-CM

## 2018-11-19 DIAGNOSIS — Z12.5 SCREENING FOR PROSTATE CANCER: ICD-10-CM

## 2018-11-19 DIAGNOSIS — Z71.6 TOBACCO ABUSE COUNSELING: ICD-10-CM

## 2018-11-19 LAB
ALBUMIN SERPL-MCNC: 4.2 G/DL (ref 3.4–5)
ALP SERPL-CCNC: 102 U/L (ref 40–150)
ALT SERPL W P-5'-P-CCNC: 33 U/L (ref 0–70)
ANION GAP SERPL CALCULATED.3IONS-SCNC: 10 MMOL/L (ref 3–14)
AST SERPL W P-5'-P-CCNC: 25 U/L (ref 0–45)
BILIRUB SERPL-MCNC: 0.7 MG/DL (ref 0.2–1.3)
BUN SERPL-MCNC: 18 MG/DL (ref 7–30)
CALCIUM SERPL-MCNC: 10.3 MG/DL (ref 8.5–10.1)
CHLORIDE SERPL-SCNC: 102 MMOL/L (ref 94–109)
CHOLEST SERPL-MCNC: 222 MG/DL
CO2 SERPL-SCNC: 26 MMOL/L (ref 20–32)
CREAT SERPL-MCNC: 1.24 MG/DL (ref 0.66–1.25)
GFR SERPL CREATININE-BSD FRML MDRD: 59 ML/MIN/1.7M2
GLUCOSE SERPL-MCNC: 87 MG/DL (ref 70–99)
HDLC SERPL-MCNC: 51 MG/DL
LDLC SERPL CALC-MCNC: 138 MG/DL
NONHDLC SERPL-MCNC: 171 MG/DL
POTASSIUM SERPL-SCNC: 4.4 MMOL/L (ref 3.4–5.3)
PROT SERPL-MCNC: 8.6 G/DL (ref 6.8–8.8)
PSA SERPL-ACNC: 1.75 UG/L (ref 0–4)
SODIUM SERPL-SCNC: 138 MMOL/L (ref 133–144)
TRIGL SERPL-MCNC: 164 MG/DL

## 2018-11-19 PROCEDURE — G0103 PSA SCREENING: HCPCS | Performed by: FAMILY MEDICINE

## 2018-11-19 PROCEDURE — G0402 INITIAL PREVENTIVE EXAM: HCPCS | Performed by: FAMILY MEDICINE

## 2018-11-19 PROCEDURE — 90682 RIV4 VACC RECOMBINANT DNA IM: CPT | Performed by: FAMILY MEDICINE

## 2018-11-19 PROCEDURE — 80053 COMPREHEN METABOLIC PANEL: CPT | Performed by: FAMILY MEDICINE

## 2018-11-19 PROCEDURE — G0008 ADMIN INFLUENZA VIRUS VAC: HCPCS | Performed by: FAMILY MEDICINE

## 2018-11-19 PROCEDURE — 80061 LIPID PANEL: CPT | Performed by: FAMILY MEDICINE

## 2018-11-19 PROCEDURE — 36415 COLL VENOUS BLD VENIPUNCTURE: CPT | Performed by: FAMILY MEDICINE

## 2018-11-19 RX ORDER — HYDROCHLOROTHIAZIDE 25 MG/1
25 TABLET ORAL DAILY
Qty: 90 TABLET | Refills: 3 | Status: SHIPPED | OUTPATIENT
Start: 2018-11-19 | End: 2019-06-03

## 2018-11-19 RX ORDER — SIMVASTATIN 10 MG
10 TABLET ORAL AT BEDTIME
Qty: 90 TABLET | Refills: 3 | Status: SHIPPED | OUTPATIENT
Start: 2018-11-19 | End: 2018-11-21

## 2018-11-19 RX ORDER — TRAZODONE HYDROCHLORIDE 50 MG/1
TABLET, FILM COATED ORAL
Qty: 90 TABLET | Refills: 0 | Status: SHIPPED | OUTPATIENT
Start: 2018-11-19 | End: 2019-01-30

## 2018-11-19 RX ORDER — ATENOLOL 50 MG/1
50 TABLET ORAL DAILY
Qty: 90 TABLET | Refills: 3 | Status: SHIPPED | OUTPATIENT
Start: 2018-11-19 | End: 2019-01-02

## 2018-11-19 RX ORDER — DILTIAZEM HYDROCHLORIDE 300 MG/1
300 CAPSULE, EXTENDED RELEASE ORAL DAILY
Qty: 90 CAPSULE | Refills: 3 | Status: SHIPPED | OUTPATIENT
Start: 2018-11-19 | End: 2018-11-30

## 2018-11-19 RX ORDER — VARENICLINE TARTRATE 1 MG/1
1 TABLET, FILM COATED ORAL 2 TIMES DAILY
Qty: 56 TABLET | Refills: 2 | Status: SHIPPED | OUTPATIENT
Start: 2018-11-19 | End: 2019-01-02

## 2018-11-19 RX ORDER — LOSARTAN POTASSIUM 100 MG/1
100 TABLET ORAL DAILY
Qty: 90 TABLET | Refills: 3 | Status: SHIPPED | OUTPATIENT
Start: 2018-11-19 | End: 2019-06-03

## 2018-11-19 ASSESSMENT — ENCOUNTER SYMPTOMS
CHILLS: 0
DIZZINESS: 0
HEMATURIA: 0
ABDOMINAL PAIN: 0
DIARRHEA: 0
HEMATOCHEZIA: 0
CONSTIPATION: 0
COUGH: 0
EYE PAIN: 0

## 2018-11-19 ASSESSMENT — PATIENT HEALTH QUESTIONNAIRE - PHQ9: SUM OF ALL RESPONSES TO PHQ QUESTIONS 1-9: 4

## 2018-11-19 NOTE — MR AVS SNAPSHOT
After Visit Summary   11/19/2018    Ming Garnett    MRN: [id number]           Patient Information     Date Of Birth          1955        Visit Information        Provider Department      11/19/2018 9:00 AM Jarrett Montague MD [facility]        Today's Diagnoses     Need for prophylactic vaccination and inoculation against influenza    -  1    Tobacco abuse counseling        Hyperlipidemia LDL goal <100        Screening for prostate cancer        Benign essential hypertension        Major depression in complete remission (H)        Insomnia, unspecified type          Care Instructions      Preventive Health Recommendations  Male Ages 50 - 64    Yearly exam:             See your health care provider every year in order to  o   Review health changes.   o   Discuss preventive care.    o   Review your medicines if your doctor has prescribed any.     Have a cholesterol test every 5 years, or more frequently if you are at risk for high cholesterol/heart disease.     Have a diabetes test (fasting glucose) every three years. If you are at risk for diabetes, you should have this test more often.     Have a colonoscopy at age 50, or have a yearly FIT test (stool test). These exams will check for colon cancer.      Talk with your health care provider about whether or not a prostate cancer screening test (PSA) is right for you.    You should be tested each year for STDs (sexually transmitted diseases), if you re at risk.     Shots: Get a flu shot each year. Get a tetanus shot every 10 years.     Nutrition:    Eat at least 5 servings of fruits and vegetables daily.     Eat whole-grain bread, whole-wheat pasta and brown rice instead of white grains and rice.     Get adequate Calcium and Vitamin D.     Lifestyle    Exercise for at least 150 minutes a week (30 minutes a day, 5 days a week). This will help you control your weight and prevent disease.     Limit alcohol to one  "drink per day.     No smoking.     Wear sunscreen to prevent skin cancer.     See your dentist every six months for an exam and cleaning.     See your eye doctor every 1 to 2 years.  Consider visit with ortho, or schedule follow-up with me if you desire to discuss the other chronic conditions that you raised today regarding your arm and knee.  You may consider working with a therapist or follow-up with me if your mood remains to have room for improvement.    I recommend you get the shingrix vaccine series to help prevent shingles. Medicare prefers this comes from a pharmacy.    Please complete the advanced care directive and return it.          Follow-ups after your visit        Follow-up notes from your care team     Return in about 4 weeks (around 12/17/2018) for BP Recheck - if it remains high, follow-up with  me..      Who to contact     If you have questions or need follow up information about today's clinic visit or your schedule please contact [facility] directly at [phone].  Normal or non-critical lab and imaging results will be communicated to you by [employer], letter or phone within 4 business days after the clinic has received the results. If you do not hear from us within 7 days, please contact the clinic through [employer] or phone. If you have a critical or abnormal lab result, we will notify you by phone as soon as possible.  Submit refill requests through [place] or call your pharmacy and they will forward the refill request to us. Please allow 3 business days for your refill to be completed.          Additional Information About Your Visit        Care EveryWhere ID     This is your Care EveryWhere ID. This could be used by other organizations to access your [place] medical records  [id number]        Your Vitals Were     Pulse Temperature Respirations Height Pulse Oximetry BMI (Body Mass Index)    58 98.1  F (36.7  C) (Oral) 16 5' 7.5\" (1.715 m) 98% 29.32 kg/m2       Blood " Pressure from Last 3 Encounters:   11/19/18 146/86   11/17/17 (!) 152/91   09/01/16 142/87    Weight from Last 3 Encounters:   11/19/18 190 lb (86.2 kg)   11/17/17 187 lb 6.4 oz (85 kg)   09/01/16 196 lb (88.9 kg)              We Performed the Following     Comprehensive metabolic panel (BMP + Alb, Alk Phos, ALT, AST, Total. Bili, TP)     FLU VACCINE, (RIV4) RECOMBINANT HA  , IM (FluBlok, egg free) [52991]- >18 YRS ([facility] recommended  50-64 YRS)     Lipid panel reflex to direct LDL Fasting     PSA, screen     Vaccine Administration, Initial [55492]          Today's Medication Changes          These changes are accurate as of 11/19/18  9:48 AM.  If you have any questions, ask your nurse or doctor.               Start taking these medicines.        Dose/Directions    * varenicline 0.5 MG X 11 & 1 MG X 42 tablet   Commonly known as:  CHANTIX STARTING MONTH DAYSI   Used for:  Tobacco abuse counseling   Started by:  Jarrett Montague MD        Take 0.5 mg tab daily for 3 days, then 0.5 mg tab twice daily for 4 days, then 1 mg twice daily.   Quantity:  53 tablet   Refills:  0       * varenicline 1 MG tablet   Commonly known as:  CHANTIX   Used for:  Tobacco abuse counseling   Started by:  Jarrett Montague MD        Dose:  1 mg   Take 1 tablet (1 mg) by mouth 2 times daily   Quantity:  56 tablet   Refills:  2       * Notice:  This list has 2 medication(s) that are the same as other medications prescribed for you. Read the directions carefully, and ask your doctor or other care provider to review them with you.      These medicines have changed or have updated prescriptions.        Dose/Directions    traZODone 50 MG tablet   Commonly known as:  DESYREL   This may have changed:  See the new instructions.   Used for:  Insomnia, unspecified type   Changed by:  Jarrett Montague MD        TAKE ONE TABLET BY MOUTH AT BEDTIME   Quantity:  90 tablet   Refills:  0            Where to get your  medicines      These medications were sent to [facility] - [place], [place] - [address]  [address] [facility] [address]     Phone:  [phone]     atenolol 50 MG tablet    diltiazem 300 MG 24 hr ER beaded capsule    hydrochlorothiazide 25 MG tablet    losartan 100 MG tablet    sertraline 50 MG tablet    simvastatin 10 MG tablet    traZODone 50 MG tablet    varenicline 0.5 MG X 11 & 1 MG X 42 tablet    varenicline 1 MG tablet                Primary Care Provider Office Phone # Fax #    Jarrett Pandey Dom Jerome [phone]       [address]  [facility] [address]        Equal Access to Services     LIZZY STANLEY : Hadii ke monet hadasho Soshade, waaxda luqadaha, qaybta kaalmada adeegyada, danyell grijalva . [facility] [phone].    ATENCIÓN: Si habla español, tiene a mao disposición servicios gratuitos de asistencia lingüística. [facility] [phone].    We comply with applicable federal civil rights laws and [place] laws. We do not discriminate on the basis of race, color, national origin, age, disability, sex, sexual orientation, or gender identity.            Thank you!     Thank you for choosing [facility]  for your care. Our goal is always to provide you with excellent care. Hearing back from our patients is one way we can continue to improve our services. Please take a few minutes to complete the written survey that you may receive in the mail after your visit with us. Thank you!             Your Updated Medication List - Protect others around you: Learn how to safely use, store and throw away your medicines at www.disposemymeds.org.          This list is accurate as of 11/19/18  9:48 AM.  Always use your most recent med list.                   Brand Name Dispense Instructions for use Diagnosis    atenolol 50 MG tablet    TENORMIN    90 tablet    Take 1 tablet (50 mg) by mouth daily    Benign essential  hypertension       diltiazem 300 MG 24 hr ER beaded capsule    TIAZAC    90 capsule    Take 1 capsule (300 mg) by mouth daily    Benign essential hypertension       hydrochlorothiazide 25 MG tablet    HYDRODIURIL    90 tablet    Take 1 tablet (25 mg) by mouth daily    Benign essential hypertension       losartan 100 MG tablet    COZAAR    90 tablet    Take 1 tablet (100 mg) by mouth daily    Benign essential hypertension       sertraline 50 MG tablet    ZOLOFT    90 tablet    Take 1 tablet (50 mg) by mouth daily    Major depression in complete remission (H)       simvastatin 10 MG tablet    ZOCOR    90 tablet    Take 1 tablet (10 mg) by mouth At Bedtime    Hyperlipidemia LDL goal <100       traZODone 50 MG tablet    DESYREL    90 tablet    TAKE ONE TABLET BY MOUTH AT BEDTIME    Insomnia, unspecified type       * varenicline 0.5 MG X 11 & 1 MG X 42 tablet    CHANTIX STARTING MONTH DAYSI    53 tablet    Take 0.5 mg tab daily for 3 days, then 0.5 mg tab twice daily for 4 days, then 1 mg twice daily.    Tobacco abuse counseling       * varenicline 1 MG tablet    CHANTIX    56 tablet    Take 1 tablet (1 mg) by mouth 2 times daily    Tobacco abuse counseling       * Notice:  This list has 2 medication(s) that are the same as other medications prescribed for you. Read the directions carefully, and ask your doctor or other care provider to review them with you.

## 2018-11-19 NOTE — LETTER
My Depression Action Plan  Name: Ming Garnett   Date of Birth 1955  Date: 11/19/2018    My doctor: Jarrett Montague   My clinic: [address] [address]  [phone]          GREEN    ZONE   Good Control    What it looks like:     Things are going generally well. You have normal up s and down s. You may even feel depressed from time to time, but bad moods usually last less than a day.   What you need to do:  1. Continue to care for yourself (see self care plan)  2. Check your depression survival kit and update it as needed  3. Follow your physician s recommendations including any medication.  4. Do not stop taking medication unless you consult with your physician first.           YELLOW         ZONE Getting Worse    What it looks like:     Depression is starting to interfere with your life.     It may be hard to get out of bed; you may be starting to isolate yourself from others.    Symptoms of depression are starting to last most all day and this has happened for several days.     You may have suicidal thoughts but they are not constant.   What you need to do:     1. Call your care team, your response to treatment will improve if you keep your care team informed of your progress. Yellow periods are signs an adjustment may need to be made.     2. Continue your self-care, even if you have to fake it!    3. Talk to someone in your support network    4. Open up your depression survival kit           RED    ZONE Medical Alert - Get Help    What it looks like:     Depression is seriously interfering with your life.     You may experience these or other symptoms: You can t get out of bed most days, can t work or engage in other necessary activities, you have trouble taking care of basic hygiene, or basic responsibilities, thoughts of suicide or death that will not go away, self-injurious behavior.     What you need to do:  1. Call your  care team and request a same-day appointment. If they are not available (weekends or after hours) call your local crisis line, emergency room or 911.            Depression Self Care Plan / Survival Kit    Self-Care for Depression  Here s the deal. Your body and mind are really not as separate as most people think.  What you do and think affects how you feel and how you feel influences what you do and think. This means if you do things that people who feel good do, it will help you feel better.  Sometimes this is all it takes.  There is also a place for medication and therapy depending on how severe your depression is, so be sure to consult with your medical provider and/ or Behavioral Health Consultant if your symptoms are worsening or not improving.     In order to better manage my stress, I will:    Exercise  Get some form of exercise, every day. This will help reduce pain and release endorphins, the  feel good  chemicals in your brain. This is almost as good as taking antidepressants!  This is not the same as joining a gym and then never going! (they count on that by the way ) It can be as simple as just going for a walk or doing some gardening, anything that will get you moving.      Hygiene   Maintain good hygiene (Get out of bed in the morning, Make your bed, Brush your teeth, Take a shower, and Get dressed like you were going to work, even if you are unemployed).  If your clothes don't fit try to get ones that do.    Diet  I will strive to eat foods that are good for me, drink plenty of water, and avoid excessive sugar, caffeine, alcohol, and other mood-altering substances.  Some foods that are helpful in depression are: complex carbohydrates, B vitamins, flaxseed, fish or fish oil, fresh fruits and vegetables.    Psychotherapy  I agree to participate in Individual Therapy (if recommended).    Medication  If prescribed medications, I agree to take them.  Missing doses can result in serious side effects.  I  understand that drinking alcohol, or other illicit drug use, may cause potential side effects.  I will not stop my medication abruptly without first discussing it with my provider.    Staying Connected With Others  I will stay in touch with my friends, family members, and my primary care provider/team.    Use your imagination  Be creative.  We all have a creative side; it doesn t matter if it s oil painting, sand castles, or mud pies! This will also kick up the endorphins.    Witness Beauty  (AKA stop and smell the roses) Take a look outside, even in mid-winter. Notice colors, textures. Watch the squirrels and birds.     Service to others  Be of service to others.  There is always someone else in need.  By helping others we can  get out of ourselves  and remember the really important things.  This also provides opportunities for practicing all the other parts of the program.    Humor  Laugh and be silly!  Adjust your TV habits for less news and crime-drama and more comedy.    Control your stress  Try breathing deep, massage therapy, biofeedback, and meditation. Find time to relax each day.     My support system    Clinic Contact:  Phone number:    Contact 1:  Phone number:    Contact 2:  Phone number:    Jain/:  Phone number:    Therapist:  Phone number:    Local crisis center:    Phone number:    Other community support:  Phone number:

## 2018-11-19 NOTE — PROGRESS NOTES
SUBJECTIVE:   Ming Garnett is a 62 year old male who presents for Preventive Visit.    Are you in the first 12 months of your Medicare coverage?  Yes,  Visual Acuity:  Right Eye: 20/20   Left Eye: 20/20  Both Eyes: 20/20. Patient wearing prescription glasses for test.     Physical   Annual:     Getting at least 3 servings of Calcium per day:  Yes    Bi-annual eye exam:  Yes    Dental care twice a year:  Yes    Sleep apnea or symptoms of sleep apnea:  None    Diet:  Regular (no restrictions)    Frequency of exercise:  1 day/week    Duration of exercise:  15-30 minutes    Taking medications regularly:  Yes    Medication side effects:  None    Additional concerns today:  Yes    He has knee pain and has trouble walking when at car shows. This leads to lower back discomfort. Has not been to the doctor in years about this.  He notes pain in his left shoulder as well with moving his left arm. He notes when he is trying to lift his left arm there is pain.    He would like a refill of his depression medication. Doesn't seem to help. Intending to bring up the topic for discussion. Feels there is room for improvement.    He needs some hydrocortisone 1% cream.    He takes trazodone at night and it doesn't give him the peaceful. Wonder if there are other options.    Fall risk:  Fallen 2 or more times in the past year?: No  Any fall with injury in the past year?: No    COGNITIVE SCREEN  1) Repeat 3 items (Leader, Season, Table)    2) Clock draw: NORMAL  3) 3 item recall: Recalls 2 objects   Results: NORMAL clock, 1-2 items recalled: COGNITIVE IMPAIRMENT LESS LIKELY    Mini-CogTM Copyright HENRY Cruz. Licensed by the author for use in [facility]; reprinted with permission (greg@[facility]). All rights reserved.        Reviewed and updated as needed this visit by clinical staff  Tobacco  Allergies  Meds  Problems  Med Hx  Surg Hx  Fam Hx  Soc Hx          Reviewed and updated as needed this visit by  "Provider  Allergies  Meds  Problems        Social History   Substance Use Topics     Smoking status: Current Some Day Smoker     Packs/day: 0.50     Types: Cigarettes     Smokeless tobacco: Never Used     Alcohol use 0.0 oz/week     0 Standard drinks or equivalent per week       Alcohol Use 11/19/2018   If you drink alcohol do you typically have greater than 3 drinks per day OR greater than 7 drinks per week? Yes       Do you feel safe in your environment - Yes    Do you have a Health Care Directive?: No: Advance care planning was reviewed with patient; patient declined at this time.    Current providers sharing in care for this patient include:   Patient Care Team:  Jarrett Montague MD as PCP - General (Family Practice)    The following health maintenance items are reviewed in Epic and correct as of today:  Health Maintenance   Topic Date Due     HIV SCREEN (SYSTEM ASSIGNED)  11/21/1971     COLON CANCER SCREEN (SYSTEM ASSIGNED)  11/21/2003     TETANUS IMMUNIZATION (SYSTEM ASSIGNED)  02/12/2019     PHQ-9 Q6 MONTHS  05/19/2019     DEPRESSION ACTION PLAN Q1 YR  11/19/2019     LIPID SCREEN Q5 YR MALE (SYSTEM ASSIGNED)  11/17/2022     ADVANCE DIRECTIVE PLANNING Q5 YRS  11/19/2023     INFLUENZA VACCINE  Completed     HEPATITIS C SCREENING  Completed       Review of Systems   Constitutional: Negative for chills.   HENT: Positive for ear pain. Negative for congestion.    Eyes: Negative for pain.   Respiratory: Negative for cough.    Cardiovascular: Negative for chest pain.   Gastrointestinal: Negative for abdominal pain, constipation, diarrhea and hematochezia.   Genitourinary: Negative for hematuria.   Neurological: Negative for dizziness.   MSK: see above.    Wears his hearing aids every day.      OBJECTIVE:   /86 (BP Location: Right arm, Patient Position: Sitting, Cuff Size: Adult Regular)  Pulse 58  Temp 98.1  F (36.7  C) (Oral)  Resp 16  Ht 5' 7.5\" (1.715 m)  Wt 190 lb (86.2 kg)  SpO2 98% " " BMI 29.32 kg/m2 Estimated body mass index is 29.32 kg/(m^2) as calculated from the following:    Height as of this encounter: 5' 7.5\" (1.715 m).    Weight as of this encounter: 190 lb (86.2 kg).  Physical Exam  GENERAL: healthy, alert and no distress  EYES: Eyes grossly normal to inspection, PERRL and conjunctivae and sclerae normal  HENT: ear canals and TM's normal, nose and mouth without ulcers or lesions  NECK: no adenopathy, no asymmetry, masses, or scars and thyroid normal to palpation  RESP: lungs clear to auscultation - no rales, rhonchi or wheezes  CV: regular rate and rhythm, normal S1 S2, no S3 or S4, no murmur, click or rub, no peripheral edema and peripheral pulses strong  ABDOMEN: soft, nontender, no hepatosplenomegaly, no masses and bowel sounds normal  MS: no gross musculoskeletal defects noted, no edema  NEURO: Normal strength and tone, mentation intact and speech normal  PSYCH: mentation appears normal, affect normal/bright      ASSESSMENT / PLAN:   Ming was seen today for physical.    Diagnoses and all orders for this visit:    Medicare annual wellness visit, initial    Need for prophylactic vaccination and inoculation against influenza  -     FLU VACCINE, (RIV4) RECOMBINANT HA  , IM (FluBlok, egg free) [52578]- >18 YRS ([facility] recommended  50-64 YRS)  -     Vaccine Administration, Initial [68388]    Tobacco abuse counseling  -     varenicline (CHANTIX STARTING MONTH DAYSI) 0.5 MG X 11 & 1 MG X 42 tablet; Take 0.5 mg tab daily for 3 days, then 0.5 mg tab twice daily for 4 days, then 1 mg twice daily.  -     varenicline (CHANTIX) 1 MG tablet; Take 1 tablet (1 mg) by mouth 2 times daily    Hyperlipidemia LDL goal <100  -     Lipid panel reflex to direct LDL Fasting  -     Comprehensive metabolic panel (BMP + Alb, Alk Phos, ALT, AST, Total. Bili, TP)  -     simvastatin (ZOCOR) 10 MG tablet; Take 1 tablet (10 mg) by mouth At Bedtime    Screening for prostate cancer  -     PSA, screen    Benign essential " "hypertension  -     atenolol (TENORMIN) 50 MG tablet; Take 1 tablet (50 mg) by mouth daily  -     diltiazem (TIAZAC) 300 MG 24 hr ER beaded capsule; Take 1 capsule (300 mg) by mouth daily  -     hydrochlorothiazide (HYDRODIURIL) 25 MG tablet; Take 1 tablet (25 mg) by mouth daily  -     losartan (COZAAR) 100 MG tablet; Take 1 tablet (100 mg) by mouth daily    Major depression in complete remission (H)  -     sertraline (ZOLOFT) 50 MG tablet; Take 1 tablet (50 mg) by mouth daily    Insomnia, unspecified type  -     traZODone (DESYREL) 50 MG tablet; TAKE ONE TABLET BY MOUTH AT BEDTIME        End of Life Planning:  Patient currently has an advanced directive: No.  I have verified the patient's ablity to prepare an advanced directive/make health care decisions.  Literature was provided to assist patient in preparing an advanced directive.    COUNSELING:  Reviewed preventive health counseling, as reflected in patient instructions       Regular exercise       Healthy diet/nutrition    BP Readings from Last 1 Encounters:   11/19/18 146/86     Estimated body mass index is 29.32 kg/(m^2) as calculated from the following:    Height as of this encounter: 5' 7.5\" (1.715 m).    Weight as of this encounter: 190 lb (86.2 kg).    Consider visit with ortho, or schedule follow-up with me if you desire to discuss the other chronic conditions that you raised today regarding your arm and knee.  You may consider working with a therapist or follow-up with me if your mood remains to have room for improvement.    I recommend you get the shingrix vaccine series to help prevent shingles. Medicare prefers this comes from a pharmacy.    Please complete the advanced care directive and return it.    Medications refilled at current doses for mailorder.    Return in about 4 weeks (around 12/17/2018) for BP Recheck - if it remains high, follow-up with  me..      reports that he has been smoking Cigarettes.  He has been smoking about 0.50 packs per " day. He has never used smokeless tobacco.      Appropriate preventive services were discussed with this patient, including applicable screening as appropriate for cardiovascular disease, diabetes, osteopenia/osteoporosis, and glaucoma.  As appropriate for age/gender, discussed screening for colorectal cancer, prostate cancer, breast cancer, and cervical cancer. Checklist reviewing preventive services available has been given to the patient.    Reviewed patients plan of care and provided an AVS. The Basic Care Plan (routine screening as documented in Health Maintenance) for Ming meets the Care Plan requirement. This Care Plan has been established and reviewed with the Patient.    Counseling Resources:  ATP IV Guidelines  Pooled Cohorts Equation Calculator  Breast Cancer Risk Calculator  FRAX Risk Assessment  ICSI Preventive Guidelines  Dietary Guidelines for Americans, 2010  [employer] MyPlate  ASA Prophylaxis  Lung CA Screening    Jarrett Jerome MD  [facility]  Answers for HPI/ROS submitted by the patient on 11/19/2018   PHQ-2 Score: 2

## 2018-11-19 NOTE — LETTER
November 21, 2018      Ming Garnett  [address] [place] [address]        Dear ,    We are writing to inform you of your test results.    I am writing to share your lab results from your recent visit at our office. They are all in a good range.    The cholesterol results are most useful when factored into the risk score noted below. A risk score below 10% indicates that you are in a good range except for the cholesterol level with a risk above 20%. I recommend we increase your simvastatin from 10mg to 40mg.    The 10-year ASCVD risk score (Maypearlzabrina MANUEL Jr, et al., 2013) is: 26.2%    Values used to calculate the score:      Age: 63 years      Sex: Male      Is Non- : No      Diabetic: No      Tobacco smoker: Yes      Systolic Blood Pressure: 146 mmHg      Is BP treated: Yes      HDL Cholesterol: 51 mg/dL      Total Cholesterol: 222 mg/dL     Please find the results below.  If you have any questions regarding these test results let me know.    Resulted Orders   Lipid panel reflex to direct LDL Fasting   Result Value Ref Range    Cholesterol 222 (H) <200 mg/dL      Comment:      Desirable:       <200 mg/dl    Triglycerides 164 (H) <150 mg/dL      Comment:      Borderline high:  150-199 mg/dl  High:             200-499 mg/dl  Very high:       >499 mg/dl      HDL Cholesterol 51 >39 mg/dL    LDL Cholesterol Calculated 138 (H) <100 mg/dL      Comment:      Above desirable:  100-129 mg/dl  Borderline High:  130-159 mg/dL  High:             160-189 mg/dL  Very high:       >189 mg/dl      Non HDL Cholesterol 171 (H) <130 mg/dL      Comment:      Above Desirable:  130-159 mg/dl  Borderline high:  160-189 mg/dl  High:             190-219 mg/dl  Very high:       >219 mg/dl     Comprehensive metabolic panel (BMP + Alb, Alk Phos, ALT, AST, Total. Bili, TP)   Result Value Ref Range    Sodium 138 133 - 144 mmol/L    Potassium 4.4 3.4 - 5.3 mmol/L    Chloride 102 94 - 109 mmol/L    Carbon  Dioxide 26 20 - 32 mmol/L    Anion Gap 10 3 - 14 mmol/L    Glucose 87 70 - 99 mg/dL    Urea Nitrogen 18 7 - 30 mg/dL    Creatinine 1.24 0.66 - 1.25 mg/dL    GFR Estimate 59 (L) >60 mL/min/1.7m2      Comment:      Non  GFR Calc    GFR Estimate If Black 71 >60 mL/min/1.7m2      Comment:       GFR Calc    Calcium 10.3 (H) 8.5 - 10.1 mg/dL    Bilirubin Total 0.7 0.2 - 1.3 mg/dL    Albumin 4.2 3.4 - 5.0 g/dL    Protein Total 8.6 6.8 - 8.8 g/dL    Alkaline Phosphatase 102 40 - 150 U/L    ALT 33 0 - 70 U/L    AST 25 0 - 45 U/L   PSA, screen   Result Value Ref Range    PSA 1.75 0 - 4 ug/L      Comment:      Assay Method:  Chemiluminescence using Siemens Vista analyzer     If you have any questions or concerns, please call the clinic at the number listed above.       Sincerely,    Jarrett Jerome MD/nr

## 2018-11-19 NOTE — NURSING NOTE
VISION   Wears glasses: worn for testing  Tool used: Miller   Right eye:        10/10 (20/20)  Left eye:          10/10 (20/20)  Visual Acuity: Pass     Hans Andrews MA on 11/19/2018 at 9:19 AM      Prior to injection verified patient identity using patient's name and date of birth.  Due to injection administration, patient instructed to remain in clinic for 15 minutes  afterwards, and to report any adverse reaction to me immediately. Vaccine information supplied.      Injectable Influenza Immunization Documentation    1.  Is the person to be vaccinated sick today?   No    2. Does the person to be vaccinated have an allergy to a component   of the vaccine?   No  Egg Allergy Algorithm Link    3. Has the person to be vaccinated ever had a serious reaction   to influenza vaccine in the past?   No    4. Has the person to be vaccinated ever had Guillain-Barré syndrome?   No      Form completed by Hans Francisco

## 2018-11-19 NOTE — PATIENT INSTRUCTIONS
Preventive Health Recommendations  Male Ages 50 - 64    Yearly exam:             See your health care provider every year in order to  o   Review health changes.   o   Discuss preventive care.    o   Review your medicines if your doctor has prescribed any.     Have a cholesterol test every 5 years, or more frequently if you are at risk for high cholesterol/heart disease.     Have a diabetes test (fasting glucose) every three years. If you are at risk for diabetes, you should have this test more often.     Have a colonoscopy at age 50, or have a yearly FIT test (stool test). These exams will check for colon cancer.      Talk with your health care provider about whether or not a prostate cancer screening test (PSA) is right for you.    You should be tested each year for STDs (sexually transmitted diseases), if you re at risk.     Shots: Get a flu shot each year. Get a tetanus shot every 10 years.     Nutrition:    Eat at least 5 servings of fruits and vegetables daily.     Eat whole-grain bread, whole-wheat pasta and brown rice instead of white grains and rice.     Get adequate Calcium and Vitamin D.     Lifestyle    Exercise for at least 150 minutes a week (30 minutes a day, 5 days a week). This will help you control your weight and prevent disease.     Limit alcohol to one drink per day.     No smoking.     Wear sunscreen to prevent skin cancer.     See your dentist every six months for an exam and cleaning.     See your eye doctor every 1 to 2 years.  Consider visit with ortho, or schedule follow-up with me if you desire to discuss the other chronic conditions that you raised today regarding your arm and knee.  You may consider working with a therapist or follow-up with me if your mood remains to have room for improvement.    I recommend you get the shingrix vaccine series to help prevent shingles. Medicare prefers this comes from a pharmacy.    Please complete the advanced care directive and return it.

## 2018-11-21 ENCOUNTER — TELEPHONE (OUTPATIENT)
Dept: FAMILY MEDICINE | Facility: CLINIC | Age: 63
End: 2018-11-21

## 2018-11-21 DIAGNOSIS — Z91.89 FRAMINGHAM CARDIAC RISK >20% IN NEXT 10 YEARS: Primary | ICD-10-CM

## 2018-11-21 RX ORDER — SIMVASTATIN 40 MG
40 TABLET ORAL AT BEDTIME
Qty: 90 TABLET | Refills: 3 | Status: SHIPPED | OUTPATIENT
Start: 2018-11-21 | End: 2019-03-06

## 2018-11-21 NOTE — TELEPHONE ENCOUNTER
Called patient in order to schedule appointment for Blood Pressure Management. Left detailed voice message to return call.  If patient returns call, please schedule appointment.  Letter sent as back up to reach patient.    Hans Andrews MA on 11/21/2018 at 12:58 PM

## 2018-11-21 NOTE — LETTER
November 21, 2018      Ming Garnett  [address] [place] [address]        Dear Ming,     Hope this letter finds you well. I would like to see you in the office for blood pressure management. Please, call the office at [phone] to schedule an appointment with me. My medical assistant, Bryanna; left you a voice message with the same instructions.    Thank you before hand for your time. Looking forward to see you soon in the office.        Sincerely,        Jarrett Jerome MD

## 2018-11-21 NOTE — TELEPHONE ENCOUNTER
Please have patient schedule appointment to come see me for BP medication management. Not in range and not ideal to have simvastatin and diltiazem.    Jarrett Jerome MD  Family Medicine Physician

## 2018-11-23 ENCOUNTER — TELEPHONE (OUTPATIENT)
Dept: FAMILY MEDICINE | Facility: CLINIC | Age: 63
End: 2018-11-23

## 2018-11-23 ENCOUNTER — NURSE TRIAGE (OUTPATIENT)
Dept: NURSING | Facility: CLINIC | Age: 63
End: 2018-11-23

## 2018-11-23 NOTE — TELEPHONE ENCOUNTER
"Wants 30 days of statin sent to [facility] on [address]. He called the Nurseline about this.     Nurseline called but confused as to his correct dose. She is seeing that he had requested 10 mg but perhaps that was from a previous rx as he had been on 10 mg in the past.    Also, Dr Dom Jerome did send message to [facility] to call pt to come back to see him for his BP check again as he was over ideal range at last visit and Dr ANDERSON said \"Please have patient schedule appointment to come see me for BP medication management. Not in range and not ideal to have simvastatin and diltiazem.\"    Pt should therefore hold off on his statin until sees MD Gisela Moore, RN, BSN       "

## 2018-11-23 NOTE — TELEPHONE ENCOUNTER
Called patient in order to schedule appointment for Blood Pressure Management. Left detailed voice message to return call.  If patient returns call, please schedule appointment.

## 2018-11-23 NOTE — TELEPHONE ENCOUNTER
Caller needs refill of simvastatin and med reorder recently to mail order pharmacy; is out and does not know when reill will come  Requesting one month herrera  Review of EMR finds request  already place and is for incorrect dose;   Contacted triage nurse from [facility] and she will review and contact patient and  PCP, pharmacy   Chelsi Quinones RN  FNA         Reason for Disposition    Caller requesting a NON-URGENT new prescription or refill and triager unable to refill per unit policy    Protocols used: MEDICATION QUESTION CALL-ADULT-

## 2018-11-26 ENCOUNTER — TELEPHONE (OUTPATIENT)
Dept: FAMILY MEDICINE | Facility: CLINIC | Age: 63
End: 2018-11-26

## 2018-11-26 NOTE — TELEPHONE ENCOUNTER
Please see phone note from 11/23/18.    I received communication from pharmacy already about this and want him to come in to see me for BP check and to discuss this.     Jarrett Jerome MD  Family Medicine Physician

## 2018-11-26 NOTE — TELEPHONE ENCOUNTER
Left message for patient to call back and speak to a nurse regarding message from Dr. MONICA Ford, KATHYN, RN  [facility]

## 2018-11-26 NOTE — TELEPHONE ENCOUNTER
Message:  Simvastatin 40 mg/day and diltiazem.  Are you aware that max simvastatin dosing is 10 mg/day with diltiazem to reduce myopathy risk and is it ok to fill simvastatin 40 mg/day with diltiazem?  Please notify [facility].  Ph # [phone] ro fx # [phone].  Thanks.

## 2018-11-26 NOTE — TELEPHONE ENCOUNTER
2 encounters discussing this matter     Closing this encounter     New encounter dated 11/26/18 opened     Nichelle Griffin Registered Nurse   [facility]

## 2018-11-27 ENCOUNTER — HEALTH MAINTENANCE LETTER (OUTPATIENT)
Age: 63
End: 2018-11-27

## 2018-11-27 NOTE — TELEPHONE ENCOUNTER
GONZALO and asked him to call back . Was in for a medicare wellness check.   Provider would like the BP rechecked and to discuss his medications.  Shakila Maynard RN

## 2018-11-30 ENCOUNTER — OFFICE VISIT (OUTPATIENT)
Dept: FAMILY MEDICINE | Facility: CLINIC | Age: 63
End: 2018-11-30
Payer: COMMERCIAL

## 2018-11-30 VITALS
TEMPERATURE: 97.9 F | DIASTOLIC BLOOD PRESSURE: 76 MMHG | SYSTOLIC BLOOD PRESSURE: 138 MMHG | WEIGHT: 190 LBS | BODY MASS INDEX: 28.79 KG/M2 | OXYGEN SATURATION: 98 % | HEART RATE: 58 BPM | HEIGHT: 68 IN | RESPIRATION RATE: 16 BRPM

## 2018-11-30 DIAGNOSIS — Z12.11 SCREEN FOR COLON CANCER: ICD-10-CM

## 2018-11-30 DIAGNOSIS — I10 BENIGN ESSENTIAL HYPERTENSION: Primary | ICD-10-CM

## 2018-11-30 DIAGNOSIS — Z23 NEED FOR PNEUMOCOCCAL VACCINATION: ICD-10-CM

## 2018-11-30 DIAGNOSIS — F32.5 MAJOR DEPRESSION IN COMPLETE REMISSION (H): ICD-10-CM

## 2018-11-30 PROCEDURE — 99214 OFFICE O/P EST MOD 30 MIN: CPT | Mod: 25 | Performed by: FAMILY MEDICINE

## 2018-11-30 PROCEDURE — G0009 ADMIN PNEUMOCOCCAL VACCINE: HCPCS | Performed by: FAMILY MEDICINE

## 2018-11-30 PROCEDURE — 90670 PCV13 VACCINE IM: CPT | Performed by: FAMILY MEDICINE

## 2018-11-30 RX ORDER — MIRTAZAPINE 7.5 MG/1
TABLET, FILM COATED ORAL
Qty: 60 TABLET | Refills: 1 | Status: SHIPPED | OUTPATIENT
Start: 2018-11-30 | End: 2019-01-30

## 2018-11-30 RX ORDER — DILTIAZEM HYDROCHLORIDE 120 MG/1
CAPSULE, EXTENDED RELEASE ORAL
Qty: 60 CAPSULE | Refills: 0 | Status: SHIPPED | OUTPATIENT
Start: 2018-11-30 | End: 2018-11-30

## 2018-11-30 NOTE — PATIENT INSTRUCTIONS
Because of the potential for interaction between diltiazem and simvastatin, without history of angina or arrhythmia, I recommend discontinuing diltiazem by weaning down.  Recheck BP with me 2-3 weeks after stopping diltiazem. Could consider higher dose of atenolol at that time pending BP results (rather than adding diltiazem with statin)    Discontinue zoloft.  Start mirtazapine 7.5mg nightly, this will help with mood and sleep.    Consider quitplan to cover chantix.    Schedule colonoscopy

## 2018-11-30 NOTE — PROGRESS NOTES
"  SUBJECTIVE:   Ming Garnett is a 63 year old male who presents to clinic today for the following health issues:      Hypertension Follow-up      Outpatient blood pressures are not being checked.    Low Salt Diet: not monitoring salt      Amount of exercise or physical activity: None    Problems taking medications regularly: No    Medication side effects: none    Diet: regular (no restrictions)        Hyperlipidemia Follow-Up      Rate your low fat/cholesterol diet?: fair    Taking statin?  Yes, no muscle aches from statin    Other lipid medications/supplements?:  none    Zoloft is working well. Being used for anxiety/stress. Tolerating it OK, takes it in AM. Thinks there is room for improvement.    Trazodone is not helping him fall asleep.    Has knee discomfort and left shoulder. Would like to return to physical therapy D[facility]. Requesting referral.    ROS  No fever  No cough    EXAM:  /76  Pulse 58  Temp 97.9  F (36.6  C)  Resp 16  Ht 5' 7.5\" (1.715 m)  Wt 190 lb (86.2 kg)  SpO2 98%  BMI 29.32 kg/m2  Constitutional: Healthy, alert, no distress   Cardiovascular: RRR. No murmurs   Respiratory: Clear to auscultation   Psych: mood good, affect appropriate, insight and judgment good, function good.    ASSESSMENT    ICD-10-CM    1. Benign essential hypertension I10 diltiazem ER (TIAZAC) 120 MG 24 hr ER beaded capsule   2. 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 Need for pneumococcal vaccination Z23 PNEUMOCOCCAL CONJ VACCINE 13 VALENT IM     ADMIN 1st VACCINE   4. Screen for colon cancer Z12.11 GASTROENTEROLOGY ADULT REF PROCEDURE ONLY [facility] [phone]      Plan:  Patient Instructions   Because of the potential for interaction between diltiazem and simvastatin, without history of angina or arrhythmia, I recommend discontinuing diltiazem by weaning down.  Recheck BP with me 2-3 weeks after stopping diltiazem. Could consider higher dose of " atenolol at that time pending BP results (rather than adding diltiazem with statin)    Discontinue zoloft.  Start mirtazapine 7.5mg nightly, this will help with mood and sleep.    Consider quitplan to cover chantix.    Schedule colonoscopy       Return in about 4 weeks (around 12/28/2018) for BP Recheck, Routine Visit.    Jarrett Jerome MD  Family Medicine Physician

## 2018-11-30 NOTE — NURSING NOTE
Prior to injection verified patient identity using patient's name and date of birth.  Due to injection administration, patient instructed to remain in clinic for 15 minutes  afterwards, and to report any adverse reaction to me immediately.    Screening Questionnaire for Adult Immunization    Are you sick today?   No   Do you have allergies to medications, food, a vaccine component or latex?   No   Have you ever had a serious reaction after receiving a vaccination?   No   Do you have a long-term health problem with heart disease, lung disease, asthma, kidney disease, metabolic disease (e.g. diabetes), anemia, or other blood disorder?   No   Do you have cancer, leukemia, HIV/AIDS, or any other immune system problem?   No   In the past 3 months, have you taken medications that affect  your immune system, such as prednisone, other steroids, or anticancer drugs; drugs for the treatment of rheumatoid arthritis, Crohn s disease, or psoriasis; or have you had radiation treatments?   No   Have you had a seizure, or a brain or other nervous system problem?   No   During the past year, have you received a transfusion of blood or blood     products, or been given immune (gamma) globulin or antiviral drug?   No   For women: Are you pregnant or is there a chance you could become        pregnant during the next month?   No   Have you received any vaccinations in the past 4 weeks?   No     Immunization questionnaire answers were all negative.        Per orders of Dr. Dom Jerome, injection of PCV13 given by Hans Andrews. Patient instructed to remain in clinic for 15 minutes afterwards, and to report any adverse reaction to me immediately.       Screening performed by Hans Andrews on 11/30/2018 at 11:25 AM.

## 2018-11-30 NOTE — MR AVS SNAPSHOT
After Visit Summary   11/30/2018    Ming Garnett    MRN: [id number]           Patient Information     Date Of Birth          1955        Visit Information        Provider Department      11/30/2018 10:30 AM Jarrett Montague MD [facility]        Today's Diagnoses     Benign essential hypertension    -  1    Major depression in complete remission (H)        Need for pneumococcal vaccination        Screen for colon cancer          Care Instructions    Because of the potential for interaction between diltiazem and simvastatin, without history of angina or arrhythmia, I recommend discontinuing diltiazem by weaning down.  Recheck BP with me 2-3 weeks after stopping diltiazem. Could consider higher dose of atenolol at that time pending BP results (rather than adding diltiazem with statin)    Discontinue zoloft.  Start mirtazapine 7.5mg nightly, this will help with mood and sleep.    Consider quitplan to cover chantix.    Schedule colonoscopy          Follow-ups after your visit        Additional Services     GASTROENTEROLOGY ADULT REF PROCEDURE ONLY [facility] [phone]       Last Lab Result: Creatinine (mg/dL)       Date                     Value                 11/19/2018               1.24             ----------  Body mass index is 29.32 kg/(m^2).     Needed:  No  Language:  English    Patient will be contacted to schedule procedure.     Please be aware that coverage of these services is subject to the terms and limitations of your health insurance plan.  Call member services at your health plan with any benefit or coverage questions.  Any procedures must be performed at a [place] facility OR coordinated by your clinic's referral office.    Please bring the following with you to your appointment:    (1) Any X-Rays, CTs or MRIs which have been performed.  Contact the facility where they were done to arrange for  prior to your scheduled  "appointment.    (2) List of current medications   (3) This referral request   (4) Any documents/labs given to you for this referral                  Follow-up notes from your care team     Return in about 4 weeks (around 12/28/2018) for BP Recheck, Routine Visit.      Who to contact     If you have questions or need follow up information about today's clinic visit or your schedule please contact [facility] directly at [phone].  Normal or non-critical lab and imaging results will be communicated to you by MyChart, letter or phone within 4 business days after the clinic has received the results. If you do not hear from us within 7 days, please contact the clinic through [facility] or phone. If you have a critical or abnormal lab result, we will notify you by phone as soon as possible.  Submit refill requests through [facility] or call your pharmacy and they will forward the refill request to us. Please allow 3 business days for your refill to be completed.          Additional Information About Your Visit        Care EveryWhere ID     This is your Care EveryWhere ID. This could be used by other organizations to access your [facility] medical records  [id number]        Your Vitals Were     Pulse Temperature Respirations Height Pulse Oximetry BMI (Body Mass Index)    58 97.9  F (36.6  C) 16 5' 7.5\" (1.715 m) 98% 29.32 kg/m2       Blood Pressure from Last 3 Encounters:   11/30/18 138/76   11/19/18 146/86   11/17/17 (!) 152/91    Weight from Last 3 Encounters:   11/30/18 190 lb (86.2 kg)   11/19/18 190 lb (86.2 kg)   11/17/17 187 lb 6.4 oz (85 kg)              We Performed the Following     GASTROENTEROLOGY ADULT REF PROCEDURE ONLY [facility] [phone]     PNEUMOCOCCAL CONJ VACCINE 13 VALENT IM          Today's Medication Changes          These changes are accurate as of 11/30/18 11:17 AM.  If you have any questions, ask your nurse or doctor.               Start taking these medicines.        " Dose/Directions    mirtazapine 7.5 MG tablet   Commonly known as:  REMERON   Used for:  Major depression in complete remission (H)   Started by:  Jarrett Montague MD        Take 7.5mg by mouth nightly, may increase after 2 weeks to 15mg by mouth.   Quantity:  60 tablet   Refills:  1         These medicines have changed or have updated prescriptions.        Dose/Directions    * diltiazem  MG 24 hr ER beaded capsule   Commonly known as:  TIAZAC   This may have changed:  Another medication with the same name was added. Make sure you understand how and when to take each.   Used for:  Benign essential hypertension   Changed by:  Jarrett Montague MD        Dose:  300 mg   Take 1 capsule (300 mg) by mouth daily   Quantity:  90 capsule   Refills:  3       * diltiazem  MG 24 hr ER beaded capsule   Commonly known as:  TIAZAC   This may have changed:  You were already taking a medication with the same name, and this prescription was added. Make sure you understand how and when to take each.   Used for:  Benign essential hypertension   Changed by:  Jarrett Montague MD        Take 240mg by mouth for 1 week then 120mg by mouth for 1 week then discontinue.   Quantity:  60 capsule   Refills:  0       * Notice:  This list has 2 medication(s) that are the same as other medications prescribed for you. Read the directions carefully, and ask your doctor or other care provider to review them with you.         Where to get your medicines      These medications were sent to [facility] - [facility] [address]  [address] [address]     Phone:  [phone]     diltiazem  MG 24 hr ER beaded capsule    mirtazapine 7.5 MG tablet                Primary Care Provider Office Phone # Fax #    Jarrett Jerome [phone]       [facility] [address] [facility] [address]        Equal Access to Services     LIZZY STANLEY AH:  Hadii ke monet haddimpleo Soomaali, waaxda luqadaha, qaybta kaalmada adeconcha, danyell lauraana maria newman. So [facility] [phone].    ATENCIÓN: Si habla julian, tiene a mao disposición servicios gratuitos de asistencia lingüística. Llame al [phone].    We comply with applicable federal civil rights laws and [place] laws. We do not discriminate on the basis of race, color, national origin, age, disability, sex, sexual orientation, or gender identity.            Thank you!     Thank you for choosing [facility]  for your care. Our goal is always to provide you with excellent care. Hearing back from our patients is one way we can continue to improve our services. Please take a few minutes to complete the written survey that you may receive in the mail after your visit with us. Thank you!             Your Updated Medication List - Protect others around you: Learn how to safely use, store and throw away your medicines at www.disposemymeds.org.          This list is accurate as of 11/30/18 11:17 AM.  Always use your most recent med list.                   Brand Name Dispense Instructions for use Diagnosis    atenolol 50 MG tablet    TENORMIN    90 tablet    Take 1 tablet (50 mg) by mouth daily    Benign essential hypertension       * diltiazem  MG 24 hr ER beaded capsule    TIAZAC    90 capsule    Take 1 capsule (300 mg) by mouth daily    Benign essential hypertension       * diltiazem  MG 24 hr ER beaded capsule    TIAZAC    60 capsule    Take 240mg by mouth for 1 week then 120mg by mouth for 1 week then discontinue.    Benign essential hypertension       hydrochlorothiazide 25 MG tablet    HYDRODIURIL    90 tablet    Take 1 tablet (25 mg) by mouth daily    Benign essential hypertension       losartan 100 MG tablet    COZAAR    90 tablet    Take 1 tablet (100 mg) by mouth daily    Benign essential hypertension       mirtazapine 7.5 MG tablet    REMERON    60 tablet    Take  7.5mg by mouth nightly, may increase after 2 weeks to 15mg by mouth.    Major depression in complete remission (H)       sertraline 50 MG tablet    ZOLOFT    90 tablet    Take 1 tablet (50 mg) by mouth daily    Major depression in complete remission (H)       simvastatin 40 MG tablet    ZOCOR    90 tablet    Take 1 tablet (40 mg) by mouth At Bedtime    [place] cardiac risk >20% in next 10 years       traZODone 50 MG tablet    DESYREL    90 tablet    TAKE ONE TABLET BY MOUTH AT BEDTIME    Insomnia, unspecified type       * varenicline 0.5 MG X 11 & 1 MG X 42 tablet    CHANTIX STARTING MONTH DAYSI    53 tablet    Take 0.5 mg tab daily for 3 days, then 0.5 mg tab twice daily for 4 days, then 1 mg twice daily.    Tobacco abuse counseling       * varenicline 1 MG tablet    CHANTIX    56 tablet    Take 1 tablet (1 mg) by mouth 2 times daily    Tobacco abuse counseling       * Notice:  This list has 4 medication(s) that are the same as other medications prescribed for you. Read the directions carefully, and ask your doctor or other care provider to review them with you.

## 2018-12-04 ENCOUNTER — RADIANT APPOINTMENT (OUTPATIENT)
Dept: GENERAL RADIOLOGY | Facility: CLINIC | Age: 63
End: 2018-12-04
Attending: FAMILY MEDICINE
Payer: COMMERCIAL

## 2018-12-04 ENCOUNTER — TELEPHONE (OUTPATIENT)
Dept: FAMILY MEDICINE | Facility: CLINIC | Age: 63
End: 2018-12-04

## 2018-12-04 ENCOUNTER — OFFICE VISIT (OUTPATIENT)
Dept: ORTHOPEDICS | Facility: CLINIC | Age: 63
End: 2018-12-04
Payer: COMMERCIAL

## 2018-12-04 VITALS
BODY MASS INDEX: 28.79 KG/M2 | HEIGHT: 68 IN | DIASTOLIC BLOOD PRESSURE: 84 MMHG | SYSTOLIC BLOOD PRESSURE: 160 MMHG | WEIGHT: 190 LBS | HEART RATE: 77 BPM

## 2018-12-04 DIAGNOSIS — F40.240 CLAUSTROPHOBIA: ICD-10-CM

## 2018-12-04 DIAGNOSIS — M23.92 ACUTE INTERNAL DERANGEMENT OF KNEE, LEFT: ICD-10-CM

## 2018-12-04 DIAGNOSIS — M25.562 LEFT KNEE PAIN: ICD-10-CM

## 2018-12-04 DIAGNOSIS — M25.562 ACUTE PAIN OF LEFT KNEE: Primary | ICD-10-CM

## 2018-12-04 RX ORDER — TRAMADOL HYDROCHLORIDE 50 MG/1
50 TABLET ORAL EVERY 6 HOURS PRN
Qty: 12 TABLET | Refills: 0 | Status: SHIPPED | OUTPATIENT
Start: 2018-12-04 | End: 2019-01-02

## 2018-12-04 RX ORDER — DIAZEPAM 5 MG
5 TABLET ORAL
Qty: 2 TABLET | Refills: 0 | Status: SHIPPED | OUTPATIENT
Start: 2018-12-04 | End: 2019-01-30

## 2018-12-04 NOTE — TELEPHONE ENCOUNTER
Reason for Call:  Other prescription    Detailed comments: Pharmacy calling to confirm refill of medication simvastatin (ZOCOR) 40 MG tablet and diltiazem (TIAZAC) 300 MG 24 hr ER beaded capsule    Taking both of these medications would increase myopathy and rhabdo risk for patient.   Please call to confirm the refill at [phone]      Call taken on 12/4/2018 at 2:42 PM by Tatum Gregory              .

## 2018-12-04 NOTE — TELEPHONE ENCOUNTER
Phone call from wife Cata     Patient had a Fall last night injury on ice - left knee and leg     Hard to walk today due to pain     Orthopedic walk in [facility] clinic information given for patient to be seen today for evaluation     Nichelle Griffin Registered Nurse   [facility] and [facility]

## 2018-12-04 NOTE — LETTER
12/4/2018       RE: Ming Garnett  [address] [place] [address]     Dear Colleague,    Thank you for referring your patient, Ming Garnett, to the [facility] SPORTS AND ORTHOPAEDIC WALK IN CLINIC at [facility]. Please see a copy of my visit note below.          SPORTS & ORTHOPEDIC WALK-IN VISIT 12/4/2018    Primary Care Physician: Dr. Fernandes  Yesterday, 12/3, fell on ice going downstairs at home. Reports that knee went behind him and hit concrete. Immediate pain/swelling Was about to move initially after fall, but has become tight. Unable to WTB with out pain. TTP medial jt space. Does have end stage OA, has been seen at [facility] for mgmt Was told will knee TKA down the road.   Reason for visit:     What part of your body is injured / painful?  left knee    What caused the injury /pain? Fall    How long ago did your injury occur or pain begin? yesterday    What are your most bothersome symptoms? Pain    How would you characterize your symptom?  aching and sharp    What makes your symptoms better? Rest, Ice and Tylenol    What makes your symptoms worse? Walking, Movement, Bending and Squatting    Have you been previously seen for this problem? No    Medical History:    Any recent changes to your medical history? No    Any new medication prescribed since last visit? No    Have you had surgery on this body part before? No    Social History:    Occupation: Retired    Handedness: Right    Exercise: 2-3 days/week    Review of Systems:    Do you have fever, chills, weight loss? No    Do you have any vision problems? No    Do you have any chest pain or edema? No    Do you have any shortness of breath or wheezing?  No    Do you have stomach problems? No    Do you have any numbness or focal weakness? No    Do you have diabetes? No    Do you have problems with bleeding or clotting? No    Do you have an rashes or other skin lesions? No           [facility]  Orthopedics  Davey  DANETTE Estes,   2018     Name: Ming Garnett  MRN: [id number]  Age: 63 year old  : 1955  Referring provider: No ref. provider found     Chief Complaint:   Left knee pain      Date of Injury: 12/3/18     History of Present Illness:   Ming Garnett is a 63 year old male with bilateral knee arthritis who presents today for evaluation of left knee pain. The patient reports that he slipped on some ice yesterday and down some stairs at home. He feels that the left leg near the knee stuck the cement resulting in immediate pain and swelling. He had good range of motion initially but it is progressively tightening and becoming more difficult to extend. He is now unable to weight bear secondary to medial knee pain, so presents to the walk-in-clinic for evaluation.     In the past his osteoarthritis of bilateral knees treated with steroid and gel injections as well as physical therapy.  He has never had pain like this in the knee. Continues to be unable to bear any weight or flex his knee today.    Review of Systems:   A 10-point review of systems was obtained and is negative except for as noted in the HPI.     Medications:     Current Outpatient Prescriptions:      atenolol (TENORMIN) 50 MG tablet, Take 1 tablet (50 mg) by mouth daily, Disp: 90 tablet, Rfl: 3     diltiazem (TIAZAC) 300 MG 24 hr ER beaded capsule, Take 1 capsule (300 mg) by mouth daily, Disp: 90 capsule, Rfl: 3     diltiazem ER (TIAZAC) 120 MG 24 hr ER beaded capsule, Take 240mg by mouth for 1 week then 120mg by mouth for 1 week then discontinue., Disp: 60 capsule, Rfl: 0     hydrochlorothiazide (HYDRODIURIL) 25 MG tablet, Take 1 tablet (25 mg) by mouth daily, Disp: 90 tablet, Rfl: 3     losartan (COZAAR) 100 MG tablet, Take 1 tablet (100 mg) by mouth daily, Disp: 90 tablet, Rfl: 3     mirtazapine (REMERON) 7.5 MG tablet, Take 7.5mg by mouth nightly, may increase after 2 weeks to 15mg by mouth., Disp: 60 tablet, Rfl: 1     sertraline (ZOLOFT) 50  "MG tablet, Take 1 tablet (50 mg) by mouth daily, Disp: 90 tablet, Rfl: 3     simvastatin (ZOCOR) 40 MG tablet, Take 1 tablet (40 mg) by mouth At Bedtime, Disp: 90 tablet, Rfl: 3     traZODone (DESYREL) 50 MG tablet, TAKE ONE TABLET BY MOUTH AT BEDTIME, Disp: 90 tablet, Rfl: 0     varenicline (CHANTIX STARTING MONTH PAK) 0.5 MG X 11 & 1 MG X 42 tablet, Take 0.5 mg tab daily for 3 days, then 0.5 mg tab twice daily for 4 days, then 1 mg twice daily. (Patient not taking: Reported on 11/30/2018), Disp: 53 tablet, Rfl: 0     varenicline (CHANTIX) 1 MG tablet, Take 1 tablet (1 mg) by mouth 2 times daily (Patient not taking: Reported on 11/30/2018), Disp: 56 tablet, Rfl: 2    Allergies:  No known drug allergies     Past Medical History:  The patient does not have any past pertinent medical history.     Past Surgical History:  History reviewed. No pertinent surgical history.     Social History:  Presents with wife and daughter   Tobacco use: 0.50 PPD   Alcohol consumption: No   Marital status:     PCP: Jarrett Jerome       Family History:  Father - cancer     Physical Examination:  Blood pressure 160/84, pulse 77, height 1.715 m (5' 7.5\"), weight 86.2 kg (190 lb).    General  - normal appearance, in no obvious distress  HEENT  -Pupils equal, round, no conjunctival injection.  No lid lag  CV  - normal popliteal pulse  Pulm  - normal respiratory pattern, non-labored  Musculoskeletal - left knee  - stance: normal gait without limp, normal single leg squat, no obvious leg length discrepancy  - inspection: moderate joint effusion, no discoloration, normal bone and joint alignment, no obvious deformity  - palpation: patella and patellar tendon non-tender, tenderness at the lateral and medial joint line, tenderness at the medial hamstring tendon.   - ROM: 60 degrees flexion, 30 degrees extension, not painful, normal actively and passively compared to contralateral  - strength: 4/5 in flexion and extension.   - " special tests:  Unable to perform Negin's as well as Lachman as we are unable to fully flex knee.  - Varus / - valgus test at 30 degrees  Neuro  - no sensory or motor deficit, grossly normal coordination, normal muscle tone  Skin  - no ecchymosis, erythema, warmth, or induration, no obvious rash  Psych  - interactive, appropriate, normal mood and affect    Imaging:   Left knee. Final results and radiologist's interpretation, available in the [facility] health record. Images were reviewed with the patient/family members in the office today. My personal interpretation of the performed imaging is moderate to sever medial compartment osteoarthritis. No acute osseous abnormalities seen. Additional moderate patellofemoral spurring.     Assessment:   63 year old male with past medical history of primary osteoarthritis of the left knee presenting with acute left knee pain and inability to flex and extend knee secondary to trauma yesterday. Radiographs without acute abnormality, however, subtle tibial plateau fracture cannot be ruled out. Given severity of pain and limitations, MRI was ordered today.      Diagnosis:  Acute traumatic pain of left knee   Primary osteoarthritis of left knee     Plan:   9. MRI of the left knee.   10. Tramadol as needed.   11. Compression sleeve for night time use.   12. Four-wheeled walker and hinge knee brace.  13. Discussed injection vs surgical intervention pending his MRI.   14. Rx diazepam x 2 tablets for MRI claustrophobia  15. Follow-up for MRI results.     I, Davey Estes DO, have reviewed the above note and agree with the scribe's notation as written.    Scribe Disclosure:   Chavo CAROLINA, am serving as a scribe to document services personally performed by Davey Estes DO at this visit, based upon the provider's statements to me. All documentation has been reviewed by the aforementioned provider prior to being entered into the official medical record.     Again, thank you for  allowing me to participate in the care of your patient.      Sincerely,    Davey Estes, DO

## 2018-12-04 NOTE — MR AVS SNAPSHOT
"              After Visit Summary   12/4/2018    Ming Garnett    MRN: [id number]           Patient Information     Date Of Birth          1955        Visit Information        Provider Department      12/4/2018 11:10 AM Davey Estes DO [facility] Sports and Orthopaedic Walk In Clinic        Today's Diagnoses     Left knee pain    -  1    Acute internal derangement of knee, left           Follow-ups after your visit        Your next 10 appointments already scheduled     Dec 10, 2018  8:00 AM CST   Return Walk In Ortho with DO [facility]opa[facility] In [facility] ([facility])    [address] [place] [address] [id number] [phone]              Future tests that were ordered for you today     Open Future Orders        Priority Expected Expires Ordered    MR Knee Left w/o Contrast Routine  12/4/2019 12/4/2018            Who to contact     Please call your clinic at [phone] to:    Ask questions about your health    Make or cancel appointments    Discuss your medicines    Learn about your test results    Speak to your doctor            Additional Information About Your Visit        Care EveryWhere ID     This is your Care EveryWhere ID. This could be used by other organizations to access your [place] medical records  [id number]        Your Vitals Were     Pulse Height BMI (Body Mass Index)             77 1.715 m (5' 7.5\") 29.32 kg/m2          Blood Pressure from Last 3 Encounters:   12/04/18 160/84   11/30/18 138/76   11/19/18 146/86    Weight from Last 3 Encounters:   12/04/18 86.2 kg (190 lb)   11/30/18 86.2 kg (190 lb)   11/19/18 86.2 kg (190 lb)                 Today's Medication Changes          These changes are accurate as of 12/4/18 12:19 PM.  If you have any questions, ask your nurse or doctor.               Start taking these medicines.        Dose/Directions    traMADol 50 MG tablet   Commonly known as:  ULTRAM   Used for:  " Left knee pain, Acute internal derangement of knee, left   Started by:  Davey Estes DO        Dose:  50 mg   Take 1 tablet (50 mg) by mouth every 6 hours as needed for severe pain   Quantity:  12 tablet   Refills:  0            Where to get your medicines      Some of these will need a paper prescription and others can be bought over the counter.  Ask your nurse if you have questions.     Bring a paper prescription for each of these medications     traMADol 50 MG tablet               Information about OPIOIDS     PRESCRIPTION OPIOIDS: WHAT YOU NEED TO KNOW   We gave you an opioid (narcotic) pain medicine. It is important to manage your pain, but opioids are not always the best choice. You should first try all the other options your care team gave you. Take this medicine for as short a time (and as few doses) as possible.    Some activities can increase your pain, such as bandage changes or therapy sessions. It may help to take your pain medicine 30 to 60 minutes before these activities. Reduce your stress by getting enough sleep, working on hobbies you enjoy and practicing relaxation or meditation. Talk to your care team about ways to manage your pain beyond prescription opioids.    These medicines have risks:    DO NOT drive when on new or higher doses of pain medicine. These medicines can affect your alertness and reaction times, and you could be arrested for driving under the influence (DUI). If you need to use opioids long-term, talk to your care team about driving.    DO NOT operate heavy machinery    DO NOT do any other dangerous activities while taking these medicines.    DO NOT drink any alcohol while taking these medicines.     If the opioid prescribed includes acetaminophen, DO NOT take with any other medicines that contain acetaminophen. Read all labels carefully. Look for the word  acetaminophen  or  Tylenol.  Ask your pharmacist if you have questions or are unsure.    You can get addicted to pain  medicines, especially if you have a history of addiction (chemical, alcohol or substance dependence). Talk to your care team about ways to reduce this risk.    All opioids tend to cause constipation. Drink plenty of water and eat foods that have a lot of fiber, such as fruits, vegetables, prune juice, apple juice and high-fiber cereal. Take a laxative (Miralax, milk of magnesia, Colace, Senna) if you don t move your bowels at least every other day. Other side effects include upset stomach, sleepiness, dizziness, throwing up, tolerance (needing more of the medicine to have the same effect), physical dependence and slowed breathing.    Store your pills in a secure place, locked if possible. We will not replace any lost or stolen medicine. If you don t finish your medicine, please throw away (dispose) as directed by your pharmacist. The [place] Pollution [employer] has more information about safe disposal: [email or web address]         Primary Care Provider Office Phone # Fax #    Jarrett Dannie Jerome [phone] [address]  [facility] [address]        Equal Access to Services     LIZZY STANLEY : Hadii ke reyeso Soshade, waaxda luqadaha, qaybta kaalmada jewel, danyell newman. So [facility] [phone].    ATENCIÓN: Si habla español, tiene a mao disposición servicios gratuitos de asistencia lingüística. Gregorio al [phone].    We comply with applicable federal civil rights laws and [place] laws. We do not discriminate on the basis of race, color, national origin, age, disability, sex, sexual orientation, or gender identity.            Thank you!     Thank you for choosing [facility] SPORTS AND ORTHOPAEDIC WALK IN CLINIC  for your care. Our goal is always to provide you with excellent care. Hearing back from our patients is one way we can continue to improve our services. Please take a few minutes to  complete the written survey that you may receive in the mail after your visit with us. Thank you!             Your Updated Medication List - Protect others around you: Learn how to safely use, store and throw away your medicines at www.disposemymeds.org.          This list is accurate as of 12/4/18 12:19 PM.  Always use your most recent med list.                   Brand Name Dispense Instructions for use Diagnosis    atenolol 50 MG tablet    TENORMIN    90 tablet    Take 1 tablet (50 mg) by mouth daily    Benign essential hypertension       * diltiazem  MG 24 hr ER beaded capsule    TIAZAC    90 capsule    Take 1 capsule (300 mg) by mouth daily    Benign essential hypertension       * diltiazem  MG 24 hr ER beaded capsule    TIAZAC    60 capsule    Take 240mg by mouth for 1 week then 120mg by mouth for 1 week then discontinue.    Benign essential hypertension       hydrochlorothiazide 25 MG tablet    HYDRODIURIL    90 tablet    Take 1 tablet (25 mg) by mouth daily    Benign essential hypertension       losartan 100 MG tablet    COZAAR    90 tablet    Take 1 tablet (100 mg) by mouth daily    Benign essential hypertension       mirtazapine 7.5 MG tablet    REMERON    60 tablet    Take 7.5mg by mouth nightly, may increase after 2 weeks to 15mg by mouth.    Major depression in complete remission (H)       sertraline 50 MG tablet    ZOLOFT    90 tablet    Take 1 tablet (50 mg) by mouth daily    Major depression in complete remission (H)       simvastatin 40 MG tablet    ZOCOR    90 tablet    Take 1 tablet (40 mg) by mouth At Bedtime    Temple cardiac risk >20% in next 10 years       traMADol 50 MG tablet    ULTRAM    12 tablet    Take 1 tablet (50 mg) by mouth every 6 hours as needed for severe pain    Left knee pain, Acute internal derangement of knee, left       traZODone 50 MG tablet    DESYREL    90 tablet    TAKE ONE TABLET BY MOUTH AT BEDTIME    Insomnia, unspecified type       * varenicline 0.5 MG  X 11 & 1 MG X 42 tablet    CHANTIX STARTING MONTH DAYSI    53 tablet    Take 0.5 mg tab daily for 3 days, then 0.5 mg tab twice daily for 4 days, then 1 mg twice daily.    Tobacco abuse counseling       * varenicline 1 MG tablet    CHANTIX    56 tablet    Take 1 tablet (1 mg) by mouth 2 times daily    Tobacco abuse counseling       * Notice:  This list has 4 medication(s) that are the same as other medications prescribed for you. Read the directions carefully, and ask your doctor or other care provider to review them with you.

## 2018-12-04 NOTE — PROGRESS NOTES
SPORTS & ORTHOPEDIC WALK-IN VISIT 12/4/2018    Primary Care Physician: Dr. Fernandes  Yesterday, 12/3, fell on ice going downstairs at home. Reports that knee went behind him and hit concrete. Immediate pain/swelling Was about to move initially after fall, but has become tight. Unable to WTB with out pain. TTP medial jt space. Does have end stage OA, has been seen at [facility] for mgmt Was told will knee TKA down the road.   Reason for visit:     What part of your body is injured / painful?  left knee    What caused the injury /pain? Fall    How long ago did your injury occur or pain begin? yesterday    What are your most bothersome symptoms? Pain    How would you characterize your symptom?  aching and sharp    What makes your symptoms better? Rest, Ice and Tylenol    What makes your symptoms worse? Walking, Movement, Bending and Squatting    Have you been previously seen for this problem? No    Medical History:    Any recent changes to your medical history? No    Any new medication prescribed since last visit? No    Have you had surgery on this body part before? No    Social History:    Occupation: Retired    Handedness: Right    Exercise: 2-3 days/week    Review of Systems:    Do you have fever, chills, weight loss? No    Do you have any vision problems? No    Do you have any chest pain or edema? No    Do you have any shortness of breath or wheezing?  No    Do you have stomach problems? No    Do you have any numbness or focal weakness? No    Do you have diabetes? No    Do you have problems with bleeding or clotting? No    Do you have an rashes or other skin lesions? No

## 2018-12-04 NOTE — PROGRESS NOTES
[facility]  Orthopedics  Davey Estes,   2018     Name: Ming Garnett  MRN: [id number]  Age: 63 year old  : 1955  Referring provider: No ref. provider found     Chief Complaint:   Left knee pain      Date of Injury: 12/3/18     History of Present Illness:   Ming Garnett is a 63 year old male with bilateral knee arthritis who presents today for evaluation of left knee pain. The patient reports that he slipped on some ice yesterday and down some stairs at home. He feels that the left leg near the knee stuck the cement resulting in immediate pain and swelling. He had good range of motion initially but it is progressively tightening and becoming more difficult to extend. He is now unable to weight bear secondary to medial knee pain, so presents to the walk-in-clinic for evaluation.     In the past his osteoarthritis of bilateral knees treated with steroid and gel injections as well as physical therapy.  He has never had pain like this in the knee. Continues to be unable to bear any weight or flex his knee today.    Review of Systems:   A 10-point review of systems was obtained and is negative except for as noted in the HPI.     Medications:     Current Outpatient Prescriptions:      atenolol (TENORMIN) 50 MG tablet, Take 1 tablet (50 mg) by mouth daily, Disp: 90 tablet, Rfl: 3     diltiazem (TIAZAC) 300 MG 24 hr ER beaded capsule, Take 1 capsule (300 mg) by mouth daily, Disp: 90 capsule, Rfl: 3     diltiazem ER (TIAZAC) 120 MG 24 hr ER beaded capsule, Take 240mg by mouth for 1 week then 120mg by mouth for 1 week then discontinue., Disp: 60 capsule, Rfl: 0     hydrochlorothiazide (HYDRODIURIL) 25 MG tablet, Take 1 tablet (25 mg) by mouth daily, Disp: 90 tablet, Rfl: 3     losartan (COZAAR) 100 MG tablet, Take 1 tablet (100 mg) by mouth daily, Disp: 90 tablet, Rfl: 3     mirtazapine (REMERON) 7.5 MG tablet, Take 7.5mg by mouth nightly, may increase after 2 weeks to 15mg by mouth., Disp: 60 tablet, Rfl:  "1     sertraline (ZOLOFT) 50 MG tablet, Take 1 tablet (50 mg) by mouth daily, Disp: 90 tablet, Rfl: 3     simvastatin (ZOCOR) 40 MG tablet, Take 1 tablet (40 mg) by mouth At Bedtime, Disp: 90 tablet, Rfl: 3     traZODone (DESYREL) 50 MG tablet, TAKE ONE TABLET BY MOUTH AT BEDTIME, Disp: 90 tablet, Rfl: 0     varenicline (CHANTIX STARTING MONTH PAK) 0.5 MG X 11 & 1 MG X 42 tablet, Take 0.5 mg tab daily for 3 days, then 0.5 mg tab twice daily for 4 days, then 1 mg twice daily. (Patient not taking: Reported on 11/30/2018), Disp: 53 tablet, Rfl: 0     varenicline (CHANTIX) 1 MG tablet, Take 1 tablet (1 mg) by mouth 2 times daily (Patient not taking: Reported on 11/30/2018), Disp: 56 tablet, Rfl: 2    Allergies:  No known drug allergies     Past Medical History:  The patient does not have any past pertinent medical history.     Past Surgical History:  History reviewed. No pertinent surgical history.     Social History:  Presents with wife and daughter   Tobacco use: 0.50 PPD   Alcohol consumption: No   Marital status:     PCP: Jarrett Jerome       Family History:  Father - cancer     Physical Examination:  Blood pressure 160/84, pulse 77, height 1.715 m (5' 7.5\"), weight 86.2 kg (190 lb).    General  - normal appearance, in no obvious distress  HEENT  -Pupils equal, round, no conjunctival injection.  No lid lag  CV  - normal popliteal pulse  Pulm  - normal respiratory pattern, non-labored  Musculoskeletal - left knee  - stance: normal gait without limp, normal single leg squat, no obvious leg length discrepancy  - inspection: moderate joint effusion, no discoloration, normal bone and joint alignment, no obvious deformity  - palpation: patella and patellar tendon non-tender, tenderness at the lateral and medial joint line, tenderness at the medial hamstring tendon.   - ROM: 60 degrees flexion, 30 degrees extension, not painful, normal actively and passively compared to contralateral  - strength: 4/5 " in flexion and extension.   - special tests:  Unable to perform Negin's as well as Lachman as we are unable to fully flex knee.  - Varus / - valgus test at 30 degrees  Neuro  - no sensory or motor deficit, grossly normal coordination, normal muscle tone  Skin  - no ecchymosis, erythema, warmth, or induration, no obvious rash  Psych  - interactive, appropriate, normal mood and affect    Imaging:   Left knee. Final results and radiologist's interpretation, available in the [facility] health record. Images were reviewed with the patient/family members in the office today. My personal interpretation of the performed imaging is moderate to sever medial compartment osteoarthritis. No acute osseous abnormalities seen. Additional moderate patellofemoral spurring.     Assessment:   63 year old male with past medical history of primary osteoarthritis of the left knee presenting with acute left knee pain and inability to flex and extend knee secondary to trauma yesterday. Radiographs without acute abnormality, however, subtle tibial plateau fracture cannot be ruled out. Given severity of pain and limitations, MRI was ordered today.      Diagnosis:  Acute traumatic pain of left knee   Primary osteoarthritis of left knee     Plan:   1. MRI of the left knee.   2. Tramadol as needed.   3. Compression sleeve for night time use.   4. Four-wheeled walker and hinge knee brace.  5. Discussed injection vs surgical intervention pending his MRI.   6. Rx diazepam x 2 tablets for MRI claustrophobia  7. Follow-up for MRI results.     I, Davey Estes DO, have reviewed the above note and agree with the scribe's notation as written.    Scribe Disclosure:   IChavo, am serving as a scribe to document services personally performed by Davey Estes DO at this visit, based upon the provider's statements to me. All documentation has been reviewed by the aforementioned provider prior to being entered into the official medical record.

## 2018-12-05 ENCOUNTER — TELEPHONE (OUTPATIENT)
Dept: ORTHOPEDICS | Facility: CLINIC | Age: 63
End: 2018-12-05

## 2018-12-05 ENCOUNTER — TELEPHONE (OUTPATIENT)
Dept: FAMILY MEDICINE | Facility: CLINIC | Age: 63
End: 2018-12-05

## 2018-12-05 NOTE — TELEPHONE ENCOUNTER
Reason for Call: Request for an order or referral:    Order or referral being requested: MRI    Date needed: as soon as possible    Has the patient been seen by the PCP for this problem? Not Applicable    Additional comments: Patient states he fell on Monday, 12/3/2018 and had X-Ray's done in which he was instructed to have an MRI. He is scheduled at [facility] [place] tomorrow, 12/6/2018 at 2:55 PM and is hoping this can be placed prior or he will have to cancel. Please assist. Thanks!    Phone number Patient can be reached at:  Other phone number: [phone]    Best Time:  Any    Can we leave a detailed message on this number?  YES    Call taken on 12/5/2018 at 10:54 AM by Magda Calix

## 2018-12-10 NOTE — TELEPHONE ENCOUNTER
See note 11/30/18, spoke with patient and advised medication change - stop diltiazem.    Jarrett Jerome MD  Family Medicine Physician

## 2018-12-10 NOTE — TELEPHONE ENCOUNTER
"Dr Dom Jerome,    You had \"doned\" this encounter. Does RN need to do anything here? Should pharmacy be told ok for these meds for this pt?    Gisela Moore RN, BSN        "

## 2018-12-10 NOTE — TELEPHONE ENCOUNTER
Phone call to [facility] mail order pharmacy     Advised that Cisco Jerome MD would like patient to STOP the diltiazem and patient was advised of this on 11/30/18 during office visit - this information was provided to the pharmacist Palmira - additional refills removed from patient profile     Nichelle Griffin Registered Nurse   [facility]

## 2018-12-10 NOTE — TELEPHONE ENCOUNTER
No pharmacy should be told not to provider diltiazem, I recommended he stop it.    Jarrett Jerome MD  Family Medicine Physician

## 2018-12-10 NOTE — TELEPHONE ENCOUNTER
Phone call to [facility] mail order pharmacy - spoke to pharmacist Palmira - advised per Cisco Jerome MD  To stop taking dilitiazem - patient was informed of this during office visit with pcp on 11/30/18    Refills will be removed from patient profile     Message left on patient's voicemail to ensure he knows he is NOT to take this any longer as pharmacy states that med was just shipped out on 12/6/18     Nichelle Griffin Registered Nurse   [facility]

## 2019-01-02 ENCOUNTER — OFFICE VISIT (OUTPATIENT)
Dept: FAMILY MEDICINE | Facility: CLINIC | Age: 64
End: 2019-01-02
Payer: COMMERCIAL

## 2019-01-02 VITALS
HEIGHT: 67 IN | BODY MASS INDEX: 30.13 KG/M2 | HEART RATE: 66 BPM | WEIGHT: 192 LBS | RESPIRATION RATE: 16 BRPM | OXYGEN SATURATION: 98 % | DIASTOLIC BLOOD PRESSURE: 90 MMHG | SYSTOLIC BLOOD PRESSURE: 138 MMHG | TEMPERATURE: 98.2 F

## 2019-01-02 DIAGNOSIS — I10 BENIGN ESSENTIAL HYPERTENSION: Primary | ICD-10-CM

## 2019-01-02 PROCEDURE — 99214 OFFICE O/P EST MOD 30 MIN: CPT | Performed by: FAMILY MEDICINE

## 2019-01-02 RX ORDER — AMLODIPINE BESYLATE 5 MG/1
TABLET ORAL
Qty: 60 TABLET | Refills: 1 | Status: SHIPPED | OUTPATIENT
Start: 2019-01-02 | End: 2019-01-30 | Stop reason: DRUGHIGH

## 2019-01-02 ASSESSMENT — MIFFLIN-ST. JEOR: SCORE: 1618.5

## 2019-01-02 NOTE — PROGRESS NOTES
"  SUBJECTIVE:   Ming Garnett is a 63 year old male who presents to clinic today for the following health issues:      Hypertension Follow-up  Answers for HPI/ROS submitted by the patient on 1/2/2019   Chronic problems general questions HPI Form  Outpatient blood pressures: are being checked  Dietary sodium intake:: Not monitoring salt intake  Blood pressures checked at: Home      Has been taking remeron at 7.5mg.  Stopped diltiazem as recommended per interaction with simvastatin.    Notes pulsating headache intermittently since starting remeron and stopping diltiazem.    Still taking trazodone to help with sleep.    Stopped zoloft as discussed.    ROS  No fever  No cough  Some intermittent headache as noted above  No palpitations    EXAM:  /90   Pulse 66   Temp 98.2  F (36.8  C)   Resp 16   Ht 1.708 m (5' 7.25\")   Wt 87.1 kg (192 lb)   SpO2 98%   BMI 29.85 kg/m    Constitutional: Healthy, alert, no distress   Cardiovascular: RRR. No murmurs   Respiratory: Clear to auscultation   Psych: mood good, affect appropriate, insight and judgment fair, function good.  MSK: no swelling of ankles.    ASSESSMENT    ICD-10-CM    1. Benign essential hypertension I10 amLODIPine (NORVASC) 5 MG tablet      Plan:  Patient Instructions   Consider discontinuing remeron if pulsating feeling in head persists. Stop if not improving in 2 weeks.  I encourage you to continue to cut back on smoking.  I recommend discontinuing atenolol (take 1/2 tablet daily for 1 week then stop) and starting norvasc 5mg daily increasing to 10mg daily after 1 week.  Follow-up in 1 month for BP check.   BP would benefit from improved management. Given no history of heart disease, recommending change of BP medications to those with fewer side effects.    Return in about 4 weeks (around 1/30/2019) for BP Recheck, Routine Visit.    Jarrett Jerome MD  Family Medicine Physician     "

## 2019-01-02 NOTE — PATIENT INSTRUCTIONS
Consider discontinuing remeron if pulsating feeling in head persists. Stop if not improving in 2 weeks.  I encourage you to continue to cut back on smoking.  I recommend discontinuing atenolol (take 1/2 tablet daily for 1 week then stop) and starting norvasc 5mg daily increasing to 10mg daily after 1 week.  Follow-up in 1 month for BP check.

## 2019-01-30 ENCOUNTER — OFFICE VISIT (OUTPATIENT)
Dept: FAMILY MEDICINE | Facility: CLINIC | Age: 64
End: 2019-01-30
Payer: COMMERCIAL

## 2019-01-30 VITALS
WEIGHT: 195.6 LBS | BODY MASS INDEX: 30.41 KG/M2 | TEMPERATURE: 97.9 F | RESPIRATION RATE: 16 BRPM | SYSTOLIC BLOOD PRESSURE: 149 MMHG | DIASTOLIC BLOOD PRESSURE: 88 MMHG | HEART RATE: 97 BPM

## 2019-01-30 DIAGNOSIS — F41.1 GAD (GENERALIZED ANXIETY DISORDER): ICD-10-CM

## 2019-01-30 DIAGNOSIS — G47.00 INSOMNIA, UNSPECIFIED TYPE: ICD-10-CM

## 2019-01-30 DIAGNOSIS — H60.333 ACUTE SWIMMER'S EAR OF BOTH SIDES: ICD-10-CM

## 2019-01-30 DIAGNOSIS — I10 BENIGN ESSENTIAL HYPERTENSION: Primary | ICD-10-CM

## 2019-01-30 DIAGNOSIS — J20.9 ACUTE BRONCHITIS WITH SYMPTOMS > 10 DAYS: ICD-10-CM

## 2019-01-30 PROCEDURE — 99214 OFFICE O/P EST MOD 30 MIN: CPT | Performed by: FAMILY MEDICINE

## 2019-01-30 RX ORDER — AMLODIPINE BESYLATE 5 MG/1
TABLET ORAL
Qty: 60 TABLET | Refills: 1 | Status: CANCELLED | OUTPATIENT
Start: 2019-01-30

## 2019-01-30 RX ORDER — CIPROFLOXACIN AND DEXAMETHASONE 3; 1 MG/ML; MG/ML
4 SUSPENSION/ DROPS AURICULAR (OTIC) 2 TIMES DAILY
Qty: 2.8 ML | Refills: 0 | Status: SHIPPED | OUTPATIENT
Start: 2019-01-30 | End: 2019-01-30

## 2019-01-30 RX ORDER — TRAZODONE HYDROCHLORIDE 50 MG/1
50-100 TABLET, FILM COATED ORAL
Qty: 180 TABLET | Refills: 3 | Status: SHIPPED | OUTPATIENT
Start: 2019-01-30 | End: 2019-01-30

## 2019-01-30 RX ORDER — NEOMYCIN SULFATE, POLYMYXIN B SULFATE, HYDROCORTISONE 3.5; 10000; 1 MG/ML; [USP'U]/ML; MG/ML
3 SOLUTION/ DROPS AURICULAR (OTIC) 4 TIMES DAILY
Qty: 5 ML | Refills: 0 | Status: SHIPPED | OUTPATIENT
Start: 2019-01-30 | End: 2019-02-25

## 2019-01-30 RX ORDER — AZITHROMYCIN 250 MG/1
TABLET, FILM COATED ORAL
Qty: 6 TABLET | Refills: 0 | Status: SHIPPED | OUTPATIENT
Start: 2019-01-30 | End: 2019-02-25

## 2019-01-30 RX ORDER — TRAZODONE HYDROCHLORIDE 50 MG/1
50-100 TABLET, FILM COATED ORAL
Qty: 180 TABLET | Refills: 3 | Status: SHIPPED | OUTPATIENT
Start: 2019-01-30 | End: 2019-01-31

## 2019-01-30 RX ORDER — VERAPAMIL HYDROCHLORIDE 120 MG/1
120 CAPSULE, EXTENDED RELEASE ORAL AT BEDTIME
Qty: 30 CAPSULE | Refills: 1 | Status: SHIPPED | OUTPATIENT
Start: 2019-01-30 | End: 2019-01-30

## 2019-01-30 RX ORDER — VERAPAMIL HYDROCHLORIDE 120 MG/1
120 TABLET, FILM COATED, EXTENDED RELEASE ORAL AT BEDTIME
Qty: 30 TABLET | Refills: 2 | Status: SHIPPED | OUTPATIENT
Start: 2019-01-30 | End: 2019-02-25

## 2019-01-30 NOTE — PATIENT INSTRUCTIONS
Stop amlodipine  Start verapamil 120mg once daily at night for BP.  Continue losartan and hydrochlorothiazide for BP.  Continue statin and zoloft.  OK to take trazodone up to 100mg nightly for sleep.    I recommend shea lotion for dry skin, limit warm water exposure, limit soaps.    I recommend use of cipro twice daily for 7 days.    If not better from bronchitis treatment follow-up in 1 week.

## 2019-01-30 NOTE — PROGRESS NOTES
SUBJECTIVE:   Ming Garnett is a 63 year old male who presents to clinic today for the following health issues:        Hypertension Follow-up      Outpatient blood pressures are being checked at home.  Results are 149 86.    Low Salt Diet: no added salt      Amount of exercise or physical activity: just as much as it takes to get around the house, more active in the summer    Problems taking medications regularly: No    Medication side effects: none    Diet: regular (no restrictions)    LAMINE    Has a cough that has dropped down into his chest. It is clear phlegm, whitish    In the last couple of weeks he has had some ear drainage.    Remeron did not help, he stopped it.  Takes 2 tablets of trazodone.    Increased amlodipine to 10mg daily.    He needs his knees replaced. He can walk a block perhaps, but has to rest when doing so or go slowly. It hurts his back as a result of his knee pain.    ROS  No feverishness  No nausea      EXAM:  /88   Pulse 97   Temp 97.9  F (36.6  C) (Oral)   Resp 16   Wt 88.7 kg (195 lb 9.6 oz)   BMI 30.41 kg/m    Constitutional: Healthy, alert, no distress, deep chest cough  EENT: PERRL, TM's clear bilaterally but with exudate in outer ear canal, oropharynx without erythema, no anterior cervical lymphadenopathy   Cardiovascular: RRR. No murmurs   Respiratory: intermittent wheeze in right posterior lung field.  MSK: ambulates with antalgic gait.    ASSESSMENT    ICD-10-CM    1. Benign essential hypertension I10 verapamil ER (VERELAN) 120 MG 24 hr capsule   2. Acute bronchitis with symptoms > 10 days J20.9    3. Acute swimmer's ear of both sides H60.333 ciprofloxacin-dexamethasone (CIPRODEX) 0.3-0.1 % otic suspension   4. Insomnia, unspecified type G47.00 traZODone (DESYREL) 50 MG tablet   5. JOHANA (generalized anxiety disorder) F41.1 sertraline (ZOLOFT) 50 MG tablet      Plan:  Patient Instructions   Stop amlodipine  Start verapamil 120mg once daily at night for BP.  Continue  losartan and hydrochlorothiazide for BP.  Continue statin and zoloft.  OK to take trazodone up to 100mg nightly for sleep.    I recommend shea lotion for dry skin, limit warm water exposure, limit soaps.    I recommend use of cipro twice daily for 7 days.    If not better from bronchitis treatment follow-up in 1 week.       Return in about 4 weeks (around 2/27/2019) for BP Recheck, Routine Visit.    Jarrett Jerome MD  Family Medicine Physician

## 2019-01-31 ENCOUNTER — TELEPHONE (OUTPATIENT)
Dept: FAMILY MEDICINE | Facility: CLINIC | Age: 64
End: 2019-01-31

## 2019-01-31 DIAGNOSIS — G47.00 INSOMNIA, UNSPECIFIED TYPE: ICD-10-CM

## 2019-01-31 RX ORDER — TRAZODONE HYDROCHLORIDE 50 MG/1
50-100 TABLET, FILM COATED ORAL
Qty: 180 TABLET | Refills: 3 | Status: SHIPPED | OUTPATIENT
Start: 2019-01-31 | End: 2019-06-03

## 2019-01-31 NOTE — TELEPHONE ENCOUNTER
Please clarify sig.  Take 1-2 tablets ( mg) by mouth nightly as needed for sleep OR TAKE ONE TABLET BY MOUTH AT BEDTIME - Oral.  Current sig says both.  Thanks.

## 2019-02-25 ENCOUNTER — OFFICE VISIT (OUTPATIENT)
Dept: FAMILY MEDICINE | Facility: CLINIC | Age: 64
End: 2019-02-25
Payer: COMMERCIAL

## 2019-02-25 VITALS
SYSTOLIC BLOOD PRESSURE: 160 MMHG | DIASTOLIC BLOOD PRESSURE: 62 MMHG | HEART RATE: 82 BPM | WEIGHT: 190 LBS | TEMPERATURE: 98.5 F | RESPIRATION RATE: 16 BRPM | HEIGHT: 67 IN | BODY MASS INDEX: 29.82 KG/M2 | OXYGEN SATURATION: 94 %

## 2019-02-25 DIAGNOSIS — J01.90 ACUTE SINUSITIS WITH SYMPTOMS > 10 DAYS: ICD-10-CM

## 2019-02-25 DIAGNOSIS — I10 BENIGN ESSENTIAL HYPERTENSION: Primary | ICD-10-CM

## 2019-02-25 PROCEDURE — 99214 OFFICE O/P EST MOD 30 MIN: CPT | Performed by: FAMILY MEDICINE

## 2019-02-25 RX ORDER — VERAPAMIL HYDROCHLORIDE 240 MG/1
240 TABLET, FILM COATED, EXTENDED RELEASE ORAL DAILY
Qty: 30 TABLET | Refills: 1 | Status: SHIPPED | OUTPATIENT
Start: 2019-02-25 | End: 2019-04-24

## 2019-02-25 ASSESSMENT — MIFFLIN-ST. JEOR: SCORE: 1609.42

## 2019-02-25 NOTE — PATIENT INSTRUCTIONS
I recommend increasing verapamil to 240mg daily.  Follow-up in 1 month for BP check    I recommend augmentin for sinusitis.

## 2019-02-25 NOTE — PROGRESS NOTES
"  SUBJECTIVE:   Ming Garnett is a 63 year old male who presents to clinic today for the following health issues:      Hypertension Follow-up      Outpatient blood pressures are being checked at home.  Results are 140/82, 145/78.    Low Salt Diet: low salt      Amount of exercise or physical activity: None    Problems taking medications regularly: No    Medication side effects: none    Diet: low salt    Ears have cleared up with drops.    Follow up on Bronchitis. Finished zpack, still has a tickle in throat and phlegm - not sure that it got better, did feel some improvement.    Patient does smoke, trying to cut back.    ROS  Difficulty hearing  No sinus pressure  No dizziness or lightheadedness    Problem list and histories reviewed & adjusted, as indicated.    EXAM:  /62   Pulse 82   Temp 98.5  F (36.9  C) (Oral)   Resp 16   Ht 1.708 m (5' 7.25\")   Wt 86.2 kg (190 lb)   SpO2 94%   BMI 29.54 kg/m    Constitutional: Healthy, alert, no distress   EENT: cerumen in canals bilaterally obstructs membrane, moist., slight postnasal drip. Minimal sinus tenderness  Cardiovascular: RRR. No murmurs   Respiratory: Clear to auscultation   Gastrointestinal: Bowel sounds active, no masses, rebound, guarding or organomegally     ASSESSMENT    ICD-10-CM    1. Benign essential hypertension I10 verapamil ER (CALAN-SR) 240 MG CR tablet   2. Acute sinusitis with symptoms > 10 days J01.90 amoxicillin-clavulanate (AUGMENTIN) 875-125 MG tablet      Plan:  Patient Instructions   I recommend increasing verapamil to 240mg daily.  Follow-up in 1 month for BP check    I recommend augmentin for sinusitis.       Return in about 4 weeks (around 3/25/2019) for BP Recheck.    Jarrett Jerome MD  Family Medicine Physician     "

## 2019-03-06 ENCOUNTER — TELEPHONE (OUTPATIENT)
Dept: FAMILY MEDICINE | Facility: CLINIC | Age: 64
End: 2019-03-06

## 2019-03-06 DIAGNOSIS — Z91.89 FRAMINGHAM CARDIAC RISK >20% IN NEXT 10 YEARS: Primary | ICD-10-CM

## 2019-03-06 RX ORDER — ATORVASTATIN CALCIUM 40 MG/1
40 TABLET, FILM COATED ORAL DAILY
Qty: 90 TABLET | Refills: 3 | Status: SHIPPED | OUTPATIENT
Start: 2019-03-06 | End: 2019-06-03

## 2019-03-06 NOTE — TELEPHONE ENCOUNTER
On further consideration recommending change from simvastatin to atorvastatin. Please notify patient.    Jarrett Jerome MD  Family Medicine Physician

## 2019-03-06 NOTE — TELEPHONE ENCOUNTER
Dr. Lopez,   Please see phone message below.     Pharmacy notes drug-drug interaction between verapamil and simvastatin, wanted to make sure provider aware.    [facility] notes: Concurrent use of SIMVASTATIN and VERAPAMIL may result in increased exposure to simvastatin and an increased risk of myopathy or rhabdomyolysis.    Please advise    Thank You!  Jazmin Benjamin, SUSANNE  Triage Nurse

## 2019-03-06 NOTE — TELEPHONE ENCOUNTER
Reason for Call:  Other call back    Detailed comments: Mary Ann is calling from [facility] pharmacy stating that patient is getting verapamil at a local pharmacy & this rx interacts with simvastatin. She'd like to know if pcp was aware & if they should consider an alternative. Please advise, thank you!     Leo solo [phone], Ref # [id number]    Phone Number Patient can be reached at: Home number on file [phone] (home)    Best Time: anytime    Can we leave a detailed message on this number? NO    Call taken on 3/6/2019 at 12:23 PM by Claire Gonzalez

## 2019-03-06 NOTE — TELEPHONE ENCOUNTER
I am aware, and discussed with patient at time of visit. Comfortable with this combination in this patient given BP management challenges.    Jarrett Jerome MD  Family Medicine Physician

## 2019-03-07 NOTE — TELEPHONE ENCOUNTER
Pt called back and advised of message below. Confirmed what pharmacy it went to.   Mary Ann Barreto RN

## 2019-04-24 ENCOUNTER — OFFICE VISIT (OUTPATIENT)
Dept: FAMILY MEDICINE | Facility: CLINIC | Age: 64
End: 2019-04-24
Payer: COMMERCIAL

## 2019-04-24 VITALS
OXYGEN SATURATION: 98 % | HEART RATE: 84 BPM | SYSTOLIC BLOOD PRESSURE: 154 MMHG | RESPIRATION RATE: 16 BRPM | WEIGHT: 190 LBS | TEMPERATURE: 98.8 F | BODY MASS INDEX: 29.54 KG/M2 | DIASTOLIC BLOOD PRESSURE: 83 MMHG

## 2019-04-24 DIAGNOSIS — Z23 NEED FOR VACCINATION: ICD-10-CM

## 2019-04-24 DIAGNOSIS — I10 BENIGN ESSENTIAL HYPERTENSION: Primary | ICD-10-CM

## 2019-04-24 DIAGNOSIS — R22.9 SKIN MASS: ICD-10-CM

## 2019-04-24 DIAGNOSIS — J30.2 SEASONAL ALLERGIES: ICD-10-CM

## 2019-04-24 PROCEDURE — 99214 OFFICE O/P EST MOD 30 MIN: CPT | Mod: 25 | Performed by: FAMILY MEDICINE

## 2019-04-24 PROCEDURE — 90471 IMMUNIZATION ADMIN: CPT | Performed by: FAMILY MEDICINE

## 2019-04-24 PROCEDURE — 90715 TDAP VACCINE 7 YRS/> IM: CPT | Performed by: FAMILY MEDICINE

## 2019-04-24 RX ORDER — AMLODIPINE BESYLATE 10 MG/1
10 TABLET ORAL DAILY
Qty: 30 TABLET | Refills: 1 | Status: SHIPPED | OUTPATIENT
Start: 2019-04-24 | End: 2019-06-03

## 2019-04-24 ASSESSMENT — PATIENT HEALTH QUESTIONNAIRE - PHQ9: SUM OF ALL RESPONSES TO PHQ QUESTIONS 1-9: 1

## 2019-04-24 NOTE — NURSING NOTE
Prior to injection, verified patient identity using patient's name and date of birth.  Due to injection administration, patient instructed to remain in clinic for 15 minutes  afterwards, and to report any adverse reaction to me immediately.    Screening Questionnaire for Adult Immunization    Are you sick today?   No   Do you have allergies to medications, food, a vaccine component or latex?   No   Have you ever had a serious reaction after receiving a vaccination?   No   Do you have a long-term health problem with heart disease, lung disease, asthma, kidney disease, metabolic disease (e.g. diabetes), anemia, or other blood disorder?   No   Do you have cancer, leukemia, HIV/AIDS, or any other immune system problem?   No   In the past 3 months, have you taken medications that affect  your immune system, such as prednisone, other steroids, or anticancer drugs; drugs for the treatment of rheumatoid arthritis, Crohn s disease, or psoriasis; or have you had radiation treatments?   No   Have you had a seizure, or a brain or other nervous system problem?   No   During the past year, have you received a transfusion of blood or blood     products, or been given immune (gamma) globulin or antiviral drug?   No   For women: Are you pregnant or is there a chance you could become        pregnant during the next month?   No   Have you received any vaccinations in the past 4 weeks?   No     Immunization questionnaire answers were all negative.        Per orders of Dr. Adorno, injection of Tdap given by Edilma Camacho. Patient instructed to remain in clinic for 15 minutes afterwards, and to report any adverse reaction to me immediately.       Screening performed by Edilma Camacho on 4/24/2019 at 11:28 AM.

## 2019-04-24 NOTE — PROGRESS NOTES
SUBJECTIVE:   Ming Garnett is a 63 year old male who presents to clinic today for the following health issues:      HPI     Presents for recheck on his blood pressure medication.  He has had multiple changes in recent past with PCP.  Recently changed to verapamil ER in past 3 months with increase to 240mg in February.  Still elevated today.  Continues to smoke.  Denies any CP or SOB or HA.    Had noticed a mass in RIGHT lateral neck that has begun to grow more.  It is nontender-- has been there awhile.    Recent URI treated with Zpak and Augmentin.  Still with some daytime phlegm.  Feels ears feel full.  Wears hearing aids.  No fever or chills.    Tdap due today.  Live with wife and daughter and grandkids.    Additional history: as documented    Reviewed and updated as needed this visit by clinical staff  Tobacco  Allergies  Meds  Med Hx  Surg Hx  Fam Hx  Soc Hx        Reviewed and updated as needed this visit by Provider             Patient Active Problem List   Diagnosis     Benign essential hypertension     Insomnia, unspecified type     Major depression in complete remission (H)     Erectile dysfunction, unspecified erectile dysfunction type     History reviewed. No pertinent surgical history.    Social History     Tobacco Use     Smoking status: Current Every Day Smoker     Packs/day: 0.50     Types: Cigarettes     Smokeless tobacco: Never Used   Substance Use Topics     Alcohol use: Yes     Alcohol/week: 0.0 oz     Family History   Problem Relation Age of Onset     Other Cancer Father          Current Outpatient Medications   Medication Sig Dispense Refill     amLODIPine (NORVASC) 10 MG tablet Take 1 tablet (10 mg) by mouth daily 30 tablet 1     atorvastatin (LIPITOR) 40 MG tablet Take 1 tablet (40 mg) by mouth daily 90 tablet 3     hydrochlorothiazide (HYDRODIURIL) 25 MG tablet Take 1 tablet (25 mg) by mouth daily 90 tablet 3     losartan (COZAAR) 100 MG tablet Take 1 tablet (100 mg) by mouth daily  90 tablet 3     sertraline (ZOLOFT) 50 MG tablet Take 1 tablet (50 mg) by mouth daily 90 tablet 3     traZODone (DESYREL) 50 MG tablet Take 1-2 tablets ( mg) by mouth nightly as needed for sleep 180 tablet 3     No Known Allergies  Recent Labs   Lab Test 11/19/18  0955 11/17/17  1400 05/06/14   A1C  --  5.4  --    * 166* 125*   HDL 51 65 45   TRIG 164* 159* 291*   ALT 33 35  --    CR 1.24 1.29*  --    GFRESTIMATED 59* 56*  --    GFRESTBLACK 71 68  --    POTASSIUM 4.4 4.2  --       BP Readings from Last 3 Encounters:   04/24/19 154/83   02/25/19 160/62   01/30/19 149/88    Wt Readings from Last 3 Encounters:   04/24/19 86.2 kg (190 lb)   02/25/19 86.2 kg (190 lb)   01/30/19 88.7 kg (195 lb 9.6 oz)                    ROS:  Constitutional, HEENT, cardiovascular, pulmonary, gi and gu systems are negative, except as otherwise noted.    OBJECTIVE:     /83   Pulse 84   Temp 98.8  F (37.1  C) (Oral)   Resp 16   Wt 86.2 kg (190 lb)   SpO2 98%   BMI 29.54 kg/m    Body mass index is 29.54 kg/m .  GENERAL: healthy, alert and no distress  EYES: Eyes grossly normal to inspection, PERRL and conjunctivae and sclerae normal  HENT: ear canals and TM's normal, nose and mouth without ulcers or lesions, wear B hearing aids  NECK: no adenopathy, no asymmetry, masses, or scars and thyroid normal to palpation  RESP: lungs clear to auscultation - no rales, rhonchi or wheezes  CV: regular rate and rhythm, normal S1 S2, no S3 or S4, no murmur, click or rub, no peripheral edema and peripheral pulses strong  ABDOMEN: soft, nontender, no hepatosplenomegaly, no masses and bowel sounds normal  MS: no gross musculoskeletal defects noted, no edema  SKIN: marble-sized mobile mass in RIGHT anterior neck  An inch below mid- jawline, superficially, nontender  NEURO: Normal strength and tone, mentation intact and speech normal  PSYCH: mentation appears normal, affect normal/bright        ASSESSMENT/PLAN:     1. Benign essential  hypertension    - amLODIPine (NORVASC) 10 MG tablet; Take 1 tablet (10 mg) by mouth daily  Dispense: 30 tablet; Refill: 1    I am going to have him stop the verapamil and start amlodipine 10mg once daily.  Recommend recheck in 2 weeks-- if still high will add metoprolol XR 25 mg once daily and titrate to goal.      2. Skin mass    - DERMATOLOGY REFERRAL    Referral to DERM for excision of RIGHT anterior neck mass    3. Seasonal allergies    Recommend using Claritin 10 mg bid to help better manage postnasal drip triggering cough.    4. Need for vaccination    - TDAP VACCINE (ADACEL)  - ADMIN 1st VACCINE    Tdap updated today.    Nina Adorno MD  [facility]

## 2019-05-08 ENCOUNTER — TRANSFERRED RECORDS (OUTPATIENT)
Dept: HEALTH INFORMATION MANAGEMENT | Facility: CLINIC | Age: 64
End: 2019-05-08

## 2019-05-13 ENCOUNTER — OFFICE VISIT (OUTPATIENT)
Dept: FAMILY MEDICINE | Facility: CLINIC | Age: 64
End: 2019-05-13
Payer: COMMERCIAL

## 2019-05-13 VITALS
BODY MASS INDEX: 30 KG/M2 | RESPIRATION RATE: 16 BRPM | TEMPERATURE: 98.6 F | WEIGHT: 193 LBS | OXYGEN SATURATION: 98 % | DIASTOLIC BLOOD PRESSURE: 82 MMHG | SYSTOLIC BLOOD PRESSURE: 136 MMHG | HEART RATE: 67 BPM

## 2019-05-13 DIAGNOSIS — I10 BENIGN ESSENTIAL HYPERTENSION: Primary | ICD-10-CM

## 2019-05-13 PROCEDURE — 99213 OFFICE O/P EST LOW 20 MIN: CPT | Performed by: FAMILY MEDICINE

## 2019-05-13 RX ORDER — ATENOLOL 50 MG/1
75 TABLET ORAL DAILY
Qty: 45 TABLET | Refills: 1
Start: 2019-05-13 | End: 2019-06-03

## 2019-05-13 RX ORDER — ATENOLOL 50 MG/1
TABLET ORAL
COMMUNITY
Start: 2019-03-04 | End: 2019-05-13 | Stop reason: DRUGHIGH

## 2019-05-13 NOTE — PROGRESS NOTES
SUBJECTIVE:   Ming Garnett is a 63 year old male who presents to clinic today for the following health issues:      HPI     Presents for recheck of his blood pressure after I discontinued his verapamil and changed him to amlodipine.  He had noticed continued elevated pressures at home and added back atenolol 50mg in past few days with improvement overall of his numbers.  He states he is feeling better today.    Additional history: as documented    Reviewed and updated as needed this visit by clinical staff  Tobacco  Allergies  Meds  Med Hx  Surg Hx  Fam Hx  Soc Hx        Reviewed and updated as needed this visit by Provider             Patient Active Problem List   Diagnosis     Benign essential hypertension     Insomnia, unspecified type     Major depression in complete remission (H)     Erectile dysfunction, unspecified erectile dysfunction type     History reviewed. No pertinent surgical history.    Social History     Tobacco Use     Smoking status: Current Every Day Smoker     Packs/day: 0.50     Types: Cigarettes     Smokeless tobacco: Never Used   Substance Use Topics     Alcohol use: Yes     Alcohol/week: 0.0 oz     Family History   Problem Relation Age of Onset     Other Cancer Father          Current Outpatient Medications   Medication Sig Dispense Refill     amLODIPine (NORVASC) 10 MG tablet Take 1 tablet (10 mg) by mouth daily 30 tablet 1     atenolol (TENORMIN) 50 MG tablet        atorvastatin (LIPITOR) 40 MG tablet Take 1 tablet (40 mg) by mouth daily 90 tablet 3     hydrochlorothiazide (HYDRODIURIL) 25 MG tablet Take 1 tablet (25 mg) by mouth daily 90 tablet 3     losartan (COZAAR) 100 MG tablet Take 1 tablet (100 mg) by mouth daily 90 tablet 3     sertraline (ZOLOFT) 50 MG tablet Take 1 tablet (50 mg) by mouth daily 90 tablet 3     traZODone (DESYREL) 50 MG tablet Take 1-2 tablets ( mg) by mouth nightly as needed for sleep 180 tablet 3     No Known Allergies  Recent Labs   Lab Test  11/19/18  0955 11/17/17  1400 05/06/14   A1C  --  5.4  --    * 166* 125*   HDL 51 65 45   TRIG 164* 159* 291*   ALT 33 35  --    CR 1.24 1.29*  --    GFRESTIMATED 59* 56*  --    GFRESTBLACK 71 68  --    POTASSIUM 4.4 4.2  --       BP Readings from Last 3 Encounters:   05/13/19 136/82   04/24/19 154/83   02/25/19 160/62    Wt Readings from Last 3 Encounters:   05/13/19 87.5 kg (193 lb)   04/24/19 86.2 kg (190 lb)   02/25/19 86.2 kg (190 lb)           ROS:  Constitutional, HEENT, cardiovascular, pulmonary, gi and gu systems are negative, except as otherwise noted.    OBJECTIVE:     /82   Pulse 67   Temp 98.6  F (37  C) (Oral)   Resp 16   Wt 87.5 kg (193 lb)   SpO2 98%   BMI 30.00 kg/m    Body mass index is 30 kg/m .  GENERAL: healthy, alert and no distress  EYES: Eyes grossly normal to inspection, PERRL and conjunctivae and sclerae normal  NECK: no adenopathy, no asymmetry, masses, or scars and thyroid normal to palpation  MS: no gross musculoskeletal defects noted, no edema  SKIN: no suspicious lesions or rashes  NEURO: Normal strength and tone, mentation intact and speech normal  PSYCH: mentation appears normal, affect normal/bright    ASSESSMENT/PLAN:     1. Benign essential hypertension    - atenolol (TENORMIN) 50 MG tablet; Take 1.5 tablets (75 mg) by mouth daily  Dispense: 45 tablet; Refill: 1    Continue on current regimen with increase to atenolol to 75 mg once daily.  Recommend recheck in 2 weeks with goal < 130/80.  Repeat BMP at that time.    Nina Adorno MD  [facility]

## 2019-06-03 ENCOUNTER — OFFICE VISIT (OUTPATIENT)
Dept: FAMILY MEDICINE | Facility: CLINIC | Age: 64
End: 2019-06-03
Payer: COMMERCIAL

## 2019-06-03 VITALS
SYSTOLIC BLOOD PRESSURE: 130 MMHG | OXYGEN SATURATION: 98 % | WEIGHT: 188 LBS | DIASTOLIC BLOOD PRESSURE: 69 MMHG | TEMPERATURE: 97.8 F | BODY MASS INDEX: 29.23 KG/M2 | HEART RATE: 59 BPM | RESPIRATION RATE: 18 BRPM

## 2019-06-03 DIAGNOSIS — Z72.0 TOBACCO ABUSE: Primary | ICD-10-CM

## 2019-06-03 DIAGNOSIS — F41.1 GAD (GENERALIZED ANXIETY DISORDER): ICD-10-CM

## 2019-06-03 DIAGNOSIS — I10 BENIGN ESSENTIAL HYPERTENSION: ICD-10-CM

## 2019-06-03 DIAGNOSIS — G47.00 INSOMNIA, UNSPECIFIED TYPE: ICD-10-CM

## 2019-06-03 DIAGNOSIS — H61.23 CERUMINOSIS, BILATERAL: ICD-10-CM

## 2019-06-03 DIAGNOSIS — H90.3 SENSORINEURAL HEARING LOSS, BILATERAL: ICD-10-CM

## 2019-06-03 DIAGNOSIS — Z91.89 FRAMINGHAM CARDIAC RISK >20% IN NEXT 10 YEARS: ICD-10-CM

## 2019-06-03 PROCEDURE — 99214 OFFICE O/P EST MOD 30 MIN: CPT | Performed by: FAMILY MEDICINE

## 2019-06-03 RX ORDER — ATENOLOL 50 MG/1
75 TABLET ORAL DAILY
Qty: 90 TABLET | Refills: 3 | Status: SHIPPED | OUTPATIENT
Start: 2019-06-03 | End: 2019-06-05

## 2019-06-03 RX ORDER — LOSARTAN POTASSIUM 100 MG/1
100 TABLET ORAL DAILY
Qty: 30 TABLET | Refills: 0 | Status: SHIPPED | OUTPATIENT
Start: 2019-06-03 | End: 2019-06-05

## 2019-06-03 RX ORDER — ATORVASTATIN CALCIUM 40 MG/1
40 TABLET, FILM COATED ORAL DAILY
Qty: 30 TABLET | Refills: 0 | Status: SHIPPED | OUTPATIENT
Start: 2019-06-03 | End: 2019-06-05

## 2019-06-03 RX ORDER — TRAZODONE HYDROCHLORIDE 50 MG/1
50-100 TABLET, FILM COATED ORAL
Qty: 180 TABLET | Refills: 3 | Status: SHIPPED | OUTPATIENT
Start: 2019-06-03 | End: 2020-05-05

## 2019-06-03 RX ORDER — AMLODIPINE BESYLATE 10 MG/1
10 TABLET ORAL DAILY
Qty: 90 TABLET | Refills: 3 | Status: SHIPPED | OUTPATIENT
Start: 2019-06-03 | End: 2019-06-03

## 2019-06-03 RX ORDER — HYDROCHLOROTHIAZIDE 25 MG/1
25 TABLET ORAL DAILY
Qty: 90 TABLET | Refills: 3 | Status: SHIPPED | OUTPATIENT
Start: 2019-06-03 | End: 2020-04-12

## 2019-06-03 RX ORDER — BUPROPION HYDROCHLORIDE 150 MG/1
150 TABLET, FILM COATED, EXTENDED RELEASE ORAL 2 TIMES DAILY
Qty: 60 TABLET | Refills: 1 | Status: SHIPPED | OUTPATIENT
Start: 2019-06-03 | End: 2019-07-12

## 2019-06-03 RX ORDER — LOSARTAN POTASSIUM 100 MG/1
100 TABLET ORAL DAILY
Qty: 90 TABLET | Refills: 3 | Status: SHIPPED | OUTPATIENT
Start: 2019-06-03 | End: 2019-06-03

## 2019-06-03 RX ORDER — AMLODIPINE BESYLATE 10 MG/1
10 TABLET ORAL DAILY
Qty: 30 TABLET | Refills: 0 | Status: SHIPPED | OUTPATIENT
Start: 2019-06-03 | End: 2019-06-05

## 2019-06-03 RX ORDER — AMLODIPINE BESYLATE 10 MG/1
10 TABLET ORAL DAILY
Qty: 30 TABLET | Refills: 1 | Status: SHIPPED | OUTPATIENT
Start: 2019-06-03 | End: 2019-06-03

## 2019-06-03 RX ORDER — ATENOLOL 50 MG/1
75 TABLET ORAL DAILY
Qty: 45 TABLET | Refills: 1 | Status: SHIPPED | OUTPATIENT
Start: 2019-06-03 | End: 2019-06-03

## 2019-06-03 RX ORDER — ATORVASTATIN CALCIUM 40 MG/1
40 TABLET, FILM COATED ORAL DAILY
Qty: 90 TABLET | Refills: 3 | Status: SHIPPED | OUTPATIENT
Start: 2019-06-03 | End: 2019-06-03

## 2019-06-03 NOTE — PROGRESS NOTES
Subjective     Ming Garnett is a 63 year old male who presents to clinic today for the following health issues:  Going out of town on Friday  Has been out of 3 medications    HPI   Hypertension Follow-up      Do you check your blood pressure regularly outside of the clinic? Yes 138/82 a couple days ago     Are you following a low salt diet? Yes, trying to keep it minimal     Are your blood pressures ever more than 140 on the top number (systolic) OR more   than 90 on the bottom number (diastolic), for example 140/90? Yes    Amount of exercise or physical activity: none and yard work     Problems taking medications regularly: No    Medication side effects: none    Diet: regular (no restrictions)    Presents today to follow up his chronic medical conditions.  He is out of his bp meds.  Feels well with new regimen.  Here for recheck after increasing his atenolol to 75 mg daily.  No CP or HA.  He has not had lipids checked since dose increase in November.  Needs refill of his statin.  JOHANA stable on sertraline.  Sleep is better with use of trazodone.  Chantix cost was $700.  Would like to try Zyban instead.  Would like referral to ENT for B ear drainage and crusting impeding his hearing aids.      Patient Active Problem List   Diagnosis     Benign essential hypertension     Insomnia, unspecified type     Major depression in complete remission (H)     Erectile dysfunction, unspecified erectile dysfunction type     History reviewed. No pertinent surgical history.    Social History     Tobacco Use     Smoking status: Current Every Day Smoker     Packs/day: 0.50     Types: Cigarettes     Smokeless tobacco: Never Used   Substance Use Topics     Alcohol use: Yes     Alcohol/week: 0.0 oz     Comment: a few beers per day      Family History   Problem Relation Age of Onset     Breast Cancer Mother      Other Cancer Father      Aneurysm Father          Current Outpatient Medications   Medication Sig Dispense Refill      amLODIPine (NORVASC) 10 MG tablet Take 1 tablet (10 mg) by mouth daily 30 tablet 0     atenolol (TENORMIN) 50 MG tablet Take 1.5 tablets (75 mg) by mouth daily 90 tablet 3     atorvastatin (LIPITOR) 40 MG tablet Take 1 tablet (40 mg) by mouth daily 30 tablet 0     buPROPion (ZYBAN) 150 MG 12 hr tablet Take 1 tablet (150 mg) by mouth 2 times daily 60 tablet 1     hydrochlorothiazide (HYDRODIURIL) 25 MG tablet Take 1 tablet (25 mg) by mouth daily 90 tablet 3     losartan (COZAAR) 100 MG tablet Take 1 tablet (100 mg) by mouth daily 30 tablet 0     sertraline (ZOLOFT) 50 MG tablet Take 1 tablet (50 mg) by mouth daily 90 tablet 3     traZODone (DESYREL) 50 MG tablet Take 1-2 tablets ( mg) by mouth nightly as needed for sleep 180 tablet 3     No Known Allergies  Recent Labs   Lab Test 11/19/18  0955 11/17/17  1400 05/06/14   A1C  --  5.4  --    * 166* 125*   HDL 51 65 45   TRIG 164* 159* 291*   ALT 33 35  --    CR 1.24 1.29*  --    GFRESTIMATED 59* 56*  --    GFRESTBLACK 71 68  --    POTASSIUM 4.4 4.2  --       BP Readings from Last 3 Encounters:   06/03/19 130/69   05/13/19 136/82   04/24/19 154/83    Wt Readings from Last 3 Encounters:   06/03/19 85.3 kg (188 lb)   05/13/19 87.5 kg (193 lb)   04/24/19 86.2 kg (190 lb)                      Reviewed and updated as needed this visit by Provider         Review of Systems   ROS COMP: Constitutional, HEENT, cardiovascular, pulmonary, GI, , musculoskeletal, neuro, skin, endocrine and psych systems are negative, except as otherwise noted.      Objective    /69   Pulse 59   Temp 97.8  F (36.6  C) (Oral)   Resp 18   Wt 85.3 kg (188 lb)   SpO2 98%   BMI 29.23 kg/m    Body mass index is 29.23 kg/m .  Physical Exam   GENERAL: healthy, alert and no distress  EYES: Eyes grossly normal to inspection, PERRL and conjunctivae and sclerae normal  NECK: no adenopathy, no asymmetry, masses, or scars and thyroid normal to palpation  RESP: lungs clear to auscultation  - no rales, rhonchi or wheezes  CV: regular rate and rhythm, normal S1 S2, no S3 or S4, no murmur, click or rub, no peripheral edema and peripheral pulses strong  MS: no gross musculoskeletal defects noted, no edema  SKIN: no suspicious lesions or rashes  PSYCH: mentation appears normal, affect normal/bright        Assessment & Plan     1. Benign essential hypertension    - hydrochlorothiazide (HYDRODIURIL) 25 MG tablet; Take 1 tablet (25 mg) by mouth daily  Dispense: 90 tablet; Refill: 3  - atenolol (TENORMIN) 50 MG tablet; Take 1.5 tablets (75 mg) by mouth daily  Dispense: 90 tablet; Refill: 3  - losartan (COZAAR) 100 MG tablet; Take 1 tablet (100 mg) by mouth daily  Dispense: 30 tablet; Refill: 0  - amLODIPine (NORVASC) 10 MG tablet; Take 1 tablet (10 mg) by mouth daily  Dispense: 30 tablet; Refill: 0  - Lipid panel reflex to direct LDL Fasting; Future  - Comprehensive metabolic panel; Future    Blood pressure stable on current medication regimen.  Refilled x 1 year.    2. [place] cardiac risk >20% in next 10 years    - atorvastatin (LIPITOR) 40 MG tablet; Take 1 tablet (40 mg) by mouth daily  Dispense: 30 tablet; Refill: 0  - Lipid panel reflex to direct LDL Fasting; Future  - Comprehensive metabolic panel; Future    Lipitor last increased by previous PCP in November from 10mg to 40mg.  Needs Follow-up lipids for recheck with goal LDL < 100.  He will return for lab only appt to complete.    3. JOHANA (generalized anxiety disorder)    - sertraline (ZOLOFT) 50 MG tablet; Take 1 tablet (50 mg) by mouth daily  Dispense: 90 tablet; Refill: 3    Stable on sertraline.  Refilled today.    4. Insomnia, unspecified type    - traZODone (DESYREL) 50 MG tablet; Take 1-2 tablets ( mg) by mouth nightly as needed for sleep  Dispense: 180 tablet; Refill: 3    Stable on trazodone.  Refilled today.    5. Tobacco abuse    - buPROPion (ZYBAN) 150 MG 12 hr tablet; Take 1 tablet (150 mg) by mouth 2 times daily  Dispense: 60  tablet; Refill: 1    Chantix not covered-- would like to try Zyban for smoking cessation.  Rx sent today-- may call for refill prn to aid in complete tobacco cessation.    6. Ceruminosis, bilateral  7. Sensorineural hearing loss, bilateral    - OTOLARYNGOLOGY REFERRAL     Desires referral to ENT to help with persistent cerumenosis and draining impeding his hearing aids.    Nina Adorno MD  [facility]

## 2019-06-05 ENCOUNTER — TELEPHONE (OUTPATIENT)
Dept: FAMILY MEDICINE | Facility: CLINIC | Age: 64
End: 2019-06-05

## 2019-06-05 DIAGNOSIS — I10 BENIGN ESSENTIAL HYPERTENSION: ICD-10-CM

## 2019-06-05 DIAGNOSIS — Z91.89 FRAMINGHAM CARDIAC RISK >20% IN NEXT 10 YEARS: ICD-10-CM

## 2019-06-05 RX ORDER — ATORVASTATIN CALCIUM 40 MG/1
40 TABLET, FILM COATED ORAL DAILY
Qty: 30 TABLET | Refills: 0 | Status: SHIPPED | OUTPATIENT
Start: 2019-06-05 | End: 2019-07-01

## 2019-06-05 RX ORDER — LOSARTAN POTASSIUM 100 MG/1
100 TABLET ORAL DAILY
Qty: 30 TABLET | Refills: 0 | Status: SHIPPED | OUTPATIENT
Start: 2019-06-05 | End: 2019-07-01

## 2019-06-05 RX ORDER — AMLODIPINE BESYLATE 10 MG/1
10 TABLET ORAL DAILY
Qty: 30 TABLET | Refills: 0 | Status: SHIPPED | OUTPATIENT
Start: 2019-06-05 | End: 2019-07-01

## 2019-06-05 RX ORDER — ATENOLOL 50 MG/1
75 TABLET ORAL DAILY
Qty: 45 TABLET | Refills: 0 | Status: SHIPPED | OUTPATIENT
Start: 2019-06-05 | End: 2019-07-01

## 2019-06-05 NOTE — TELEPHONE ENCOUNTER
Reason for Call:  Other prescription    Detailed comments: Patient states he requested the medications listed below be sent to [id number] - [place], [place] - [address]  [phone] for 30 day supply and 90-day supply sent to [facility]. He is out of these medications and needs to get one month from the local pharmacy. Please assist. Thanks!  - amLODIPine (NORVASC) 10 MG tablet  - losartan (COZAAR) 100 MG tablet  - atorvastatin (LIPITOR) 40 MG tablet    Phone Number Patient can be reached at: Cell number on file:    Telephone Information:   Mobile [phone]       Best Time: Any    Can we leave a detailed message on this number? YES    Call taken on 6/5/2019 at 11:20 AM by Magda Calix

## 2019-06-05 NOTE — TELEPHONE ENCOUNTER
Writer notes 90 day supplies have been sent to [facility]    30 days supplies were sent to [facility] in error    Writer re-sent prescriptions to [facility] and notified patient via voice message    Signed Prescriptions:                        Disp   Refills    amLODIPine (NORVASC) 10 MG tablet          30 tab*0        Sig: Take 1 tablet (10 mg) by mouth daily  Authorizing Provider: GIANFRANCO GIVENS  Ordering User: ROSELYN HENDRIX    atenolol (TENORMIN) 50 MG tablet           45 tab*0        Sig: Take 1.5 tablets (75 mg) by mouth daily  Authorizing Provider: GIANFRANCO GIVENS  Ordering User: ROSELYN HENDRIX    atorvastatin (LIPITOR) 40 MG tablet        30 tab*0        Sig: Take 1 tablet (40 mg) by mouth daily  Authorizing Provider: GIANFRANCO GIVENS  Ordering User: ROSELYN HENDRIX    losartan (COZAAR) 100 MG tablet            30 tab*0        Sig: Take 1 tablet (100 mg) by mouth daily  Authorizing Provider: GIANFRANCO GIVENS  Ordering User: ROSELYN HENDRIX

## 2019-06-18 ENCOUNTER — DOCUMENTATION ONLY (OUTPATIENT)
Dept: CARE COORDINATION | Facility: CLINIC | Age: 64
End: 2019-06-18

## 2019-06-25 NOTE — TELEPHONE ENCOUNTER
FUTURE VISIT INFORMATION      FUTURE VISIT INFORMATION:    Date: 7/12/19    Time: 8:00 am    Location: [facility]  REFERRAL INFORMATION:    Referring provider:  Dr. Nina Adorno    Referring providers clinic:  [facility]    Reason for visit/diagnosis  Ear drainage    RECORDS REQUESTED FROM:       Clinic name Comments Records Status Imaging Status   [facility] Office visit with Dr. Adorno-6/3/19 Epic                 Patient will bring in audiogram, per appt notes                   Action    6/25/19 12 pm Called patient to ask where he has had hearing tested in past and where he gets his hearing aids.  Also reminded him to bring audiogram.  PB  7/3/19-Called patient again.  SANA

## 2019-07-01 DIAGNOSIS — I10 BENIGN ESSENTIAL HYPERTENSION: ICD-10-CM

## 2019-07-01 DIAGNOSIS — Z91.89 FRAMINGHAM CARDIAC RISK >20% IN NEXT 10 YEARS: ICD-10-CM

## 2019-07-01 RX ORDER — AMLODIPINE BESYLATE 10 MG/1
TABLET ORAL
Qty: 90 TABLET | Refills: 1 | Status: SHIPPED | OUTPATIENT
Start: 2019-07-01 | End: 2020-04-12

## 2019-07-01 RX ORDER — LOSARTAN POTASSIUM 100 MG/1
100 TABLET ORAL DAILY
Qty: 90 TABLET | Refills: 1 | Status: SHIPPED | OUTPATIENT
Start: 2019-07-01 | End: 2020-04-12

## 2019-07-01 RX ORDER — ATENOLOL 50 MG/1
75 TABLET ORAL DAILY
Qty: 135 TABLET | Refills: 1 | Status: SHIPPED | OUTPATIENT
Start: 2019-07-01 | End: 2019-12-03

## 2019-07-01 RX ORDER — ATORVASTATIN CALCIUM 40 MG/1
40 TABLET, FILM COATED ORAL DAILY
Qty: 90 TABLET | Refills: 1 | Status: SHIPPED | OUTPATIENT
Start: 2019-07-01 | End: 2020-04-12

## 2019-07-01 NOTE — TELEPHONE ENCOUNTER
"Requested Prescriptions   Pending Prescriptions Disp Refills     atenolol (TENORMIN) 50 MG tablet [Pharmacy Med Name: Atenolol Oral Tablet 50 MG]  Last Written Prescription Date:  6-5-19  Last Fill Quantity: 45 ta,  # refills: 0   Last office visit: 6/3/2019 with prescribing provider: GIANFRANCO GIVENS    Future Office Visit:    45 tablet 0     Sig: Take 1.5 tablets (75 mg) by mouth daily       Beta-Blockers Protocol Passed - 7/1/2019  7:02 AM        Passed - Blood pressure under 140/90 in past 12 months     BP Readings from Last 3 Encounters:   06/03/19 130/69   05/13/19 136/82   04/24/19 154/83           Passed - Patient is age 6 or older        Passed - Recent (12 mo) or future (30 days) visit within the authorizing provider's specialty     Patient had office visit in the last 12 months or has a visit in the next 30 days with authorizing provider or within the authorizing provider's specialty.  See \"Patient Info\" tab in inbasket, or \"Choose Columns\" in Meds & Orders section of the refill encounter.          Passed - Medication is active on med list     ____________________________________         losartan (COZAAR) 100 MG tablet [Pharmacy Med Name: Losartan Potassium Oral Tablet 100 MG]  Last Written Prescription Date:  6-5-19  Last Fill Quantity: 30 tab,  # refills: 0   Last office visit: 6/3/2019 with prescribing provider:  GIANFRANCO GIVENS    Future Office Visit:    30 tablet 0     Sig: Take 1 tablet (100 mg) by mouth daily       Angiotensin-II Receptors Passed - 7/1/2019  7:02 AM        Passed - Blood pressure under 140/90 in past 12 months     BP Readings from Last 3 Encounters:   06/03/19 130/69   05/13/19 136/82   04/24/19 154/83           Passed - Recent (12 mo) or future (30 days) visit within the authorizing provider's specialty     Patient had office visit in the last 12 months or has a visit in the next 30 days with authorizing provider or within the authorizing provider's specialty.  See \"Patient " "Info\" tab in inbasket, or \"Choose Columns\" in Meds & Orders section of the refill encounter.          Passed - Medication is active on med list        Passed - Patient is age 18 or older        Passed - Normal serum creatinine on file in past 12 months     Recent Labs   Lab Test 11/19/18  0955   CR 1.24           Passed - Normal serum potassium on file in past 12 months     Recent Labs   Lab Test 11/19/18  0955   POTASSIUM 4.4        ____________________________________         atorvastatin (LIPITOR) 40 MG tablet [Pharmacy Med Name: Atorvastatin Calcium Oral Tablet 40 MG]  Last Written Prescription Date:  6-5-19  Last Fill Quantity: 30 tab,  # refills: 0   Last office visit: 6/3/2019 with prescribing provider:  GIANFRANCO GIVENS    Future Office Visit:    30 tablet 0     Sig: Take 1 tablet (40 mg) by mouth daily       Statins Protocol Passed - 7/1/2019  7:02 AM        Passed - LDL on file in past 12 months     Recent Labs   Lab Test 11/19/18  0955   *           Passed - No abnormal creatine kinase in past 12 months     No lab results found.         Passed - Recent (12 mo) or future (30 days) visit within the authorizing provider's specialty     Patient had office visit in the last 12 months or has a visit in the next 30 days with authorizing provider or within the authorizing provider's specialty.  See \"Patient Info\" tab in inbasket, or \"Choose Columns\" in Meds & Orders section of the refill encounter.          Passed - Medication is active on med list        Passed - Patient is age 18 or older     ____________________________________         amLODIPine (NORVASC) 10 MG tablet [Pharmacy Med Name: amLODIPine Besylate Oral Tablet 10 MG]  Last Written Prescription Date:  6-5-19  Last Fill Quantity: 30 tab,  # refills: 0   Last office visit: 6/3/2019 with prescribing provider:  GIANFRANCO GIVENS Office Visit:    30 tablet 0     Sig: TAKE ONE TABLET BY MOUTH ONE TIME DAILY       Calcium Channel Blockers " "Protocol  Passed - 7/1/2019  7:02 AM        Passed - Blood pressure under 140/90 in past 12 months     BP Readings from Last 3 Encounters:   06/03/19 130/69   05/13/19 136/82   04/24/19 154/83           Passed - Recent (12 mo) or future (30 days) visit within the authorizing provider's specialty     Patient had office visit in the last 12 months or has a visit in the next 30 days with authorizing provider or within the authorizing provider's specialty.  See \"Patient Info\" tab in inbasket, or \"Choose Columns\" in Meds & Orders section of the refill encounter.          Passed - Medication is active on med list        Passed - Patient is age 18 or older        Passed - Normal serum creatinine on file in past 12 months     Recent Labs   Lab Test 11/19/18  0955   CR 1.24            "

## 2019-07-01 NOTE — TELEPHONE ENCOUNTER
"Lov: 6.3.2019   \"Return in about 6 months (around 12/3/2019) for follow up chronic conditions.\"      Prescription approved per [facility] Refill Protocol.  Thanks! Rupinder Jackman RN    "

## 2019-07-11 DIAGNOSIS — H91.90 HEARING LOSS, UNSPECIFIED HEARING LOSS TYPE, UNSPECIFIED LATERALITY: Primary | ICD-10-CM

## 2019-07-12 ENCOUNTER — PRE VISIT (OUTPATIENT)
Dept: OTOLARYNGOLOGY | Facility: CLINIC | Age: 64
End: 2019-07-12

## 2019-07-12 ENCOUNTER — OFFICE VISIT (OUTPATIENT)
Dept: OTOLARYNGOLOGY | Facility: CLINIC | Age: 64
End: 2019-07-12
Payer: COMMERCIAL

## 2019-07-12 VITALS
WEIGHT: 188 LBS | HEIGHT: 67 IN | HEART RATE: 61 BPM | DIASTOLIC BLOOD PRESSURE: 82 MMHG | BODY MASS INDEX: 29.51 KG/M2 | SYSTOLIC BLOOD PRESSURE: 141 MMHG

## 2019-07-12 DIAGNOSIS — H92.13 OTORRHEA, BILATERAL: Primary | ICD-10-CM

## 2019-07-12 RX ORDER — OFLOXACIN 3 MG/ML
2 SOLUTION AURICULAR (OTIC) 2 TIMES DAILY
Qty: 1 BOTTLE | Refills: 1 | Status: SHIPPED | OUTPATIENT
Start: 2019-07-12 | End: 2019-07-26

## 2019-07-12 RX ORDER — CIPROFLOXACIN AND DEXAMETHASONE 3; 1 MG/ML; MG/ML
SUSPENSION/ DROPS AURICULAR (OTIC)
Qty: 1 BOTTLE | Refills: 1 | Status: SHIPPED | OUTPATIENT
Start: 2019-07-12 | End: 2019-07-12

## 2019-07-12 ASSESSMENT — MIFFLIN-ST. JEOR: SCORE: 1601.51

## 2019-07-12 ASSESSMENT — PAIN SCALES - GENERAL: PAINLEVEL: NO PAIN (0)

## 2019-07-12 NOTE — PROGRESS NOTES
The patient presents with a history of otorrhea from both ears. He has sensorineural hearing loss and he wears hearing aids in both ears. He was scheduled for an Audiogram and Tympanogram, but his could not be performed due to otitis externa in both ears.  The patient denies sinusitis, rhinitis, facial pain, nasal obstruction or purulent nasal discharge. The patient denies chronic or recurrent tonsillitis, chronic or recurrent pharyngitis.     This patient is seen in consultation at the request of Dr. Nina Adorno.    All other systems were reviewed and they are either negative or they are not directly pertinent to this Otolaryngology examination.      Past Medical History:    Past Medical History:   Diagnosis Date     Hearing problem      Hypertension      Sensorineural hearing loss      Sleep apnea        Past Surgical History:    No past surgical history on file.    Medications:      Current Outpatient Medications:      amLODIPine (NORVASC) 10 MG tablet, TAKE ONE TABLET BY MOUTH ONE TIME DAILY , Disp: 90 tablet, Rfl: 1     atenolol (TENORMIN) 50 MG tablet, Take 1.5 tablets (75 mg) by mouth daily, Disp: 135 tablet, Rfl: 1     atorvastatin (LIPITOR) 40 MG tablet, Take 1 tablet (40 mg) by mouth daily, Disp: 90 tablet, Rfl: 1     ciprofloxacin-dexamethasone (CIPRODEX) 0.3-0.1 % otic suspension, 2 drops each ear BID X 14 days supply, Disp: 1 Bottle, Rfl: 1     hydrochlorothiazide (HYDRODIURIL) 25 MG tablet, Take 1 tablet (25 mg) by mouth daily, Disp: 90 tablet, Rfl: 3     losartan (COZAAR) 100 MG tablet, Take 1 tablet (100 mg) by mouth daily, Disp: 90 tablet, Rfl: 1     sertraline (ZOLOFT) 50 MG tablet, Take 1 tablet (50 mg) by mouth daily, Disp: 90 tablet, Rfl: 3     traZODone (DESYREL) 50 MG tablet, Take 1-2 tablets ( mg) by mouth nightly as needed for sleep, Disp: 180 tablet, Rfl: 3    Allergies:    Patient has no known allergies.    Physical Examination:    The patient is a well developed well nourished  male in no apparent distress.  He is normocephalic, atraumatic with pupils round an reactive to light.    Oral Cavity Examination:  Normal mucosa with no masses or lesions  Nasal Examination: Normal mucosa with no masses or lesions  Ear Examination: Crusted, chapped ear canal skin bilaterally with purulent material in the ear canals.  Normal tympanic membranes and middle ear spaces bilaterally. Ear canals are cleaned with a suction bilaterally.   Neurological Examination: Facial nerve function intact and symmetric  Integumentary Examination: No lesions on the skin of the head and neck  Neck Examination:  No masses or lesions, no lymphadenopathy  Endocrine Examination:  Normal thyroid examination    Assessment and Plan:    The patient presents with a history of sensorineural hearing loss and otitis externa.  He will be treated with Ciprodex Otic drops and he will be seen again in two weeks to assess his progress with an Audiogram and Tympanogram after his follow up visit.     CC: Nina Adorno

## 2019-07-12 NOTE — NURSING NOTE
"Chief Complaint   Patient presents with     Consult     Bilateral senorineural hearing loss     Blood pressure 141/82, pulse 61, height 1.71 m (5' 7.32\"), weight 85.3 kg (188 lb).    Beto Damon LPN    "

## 2019-07-12 NOTE — PATIENT INSTRUCTIONS
1.  You were seen in the ENT Clinic today by Dr. Crawford.  If you have any questions or concerns after your appointment, please call [phone]. Press option #1 for scheduling related needs. Press option #3 for Nurse advice.    2.  Please start using Ciprodex Otic Drops as directed.    3.  Plan is to return to clinic in 2 weeks with an Audiogram and Tympanogram (hearing test) AFTER appointment with Dr. Crawford.      Cierra Muhammad LPN  [facility] Otolaryng[facility]  [phone]    The patient presents with a history of sensorineural hearing loss and otitis externa.  He will be treated with Ciprodex Otic drops and he will be seen again in two weeks to assess his progress with an Audiogram and Tympanogram after his follow up visit.

## 2019-07-12 NOTE — LETTER
7/12/2019       RE: Ming Garnett  [address] [place] [address]     Dear Colleague,    Thank you for referring your patient, Ming Garnett, to the [facility] EAR NOSE AND THROAT at [facility]. Please see a copy of my visit note below.    The patient presents with a history of otorrhea from both ears. He has sensorineural hearing loss and he wears hearing aids in both ears. He was scheduled for an Audiogram and Tympanogram, but his could not be performed due to otitis externa in both ears.  The patient denies sinusitis, rhinitis, facial pain, nasal obstruction or purulent nasal discharge. The patient denies chronic or recurrent tonsillitis, chronic or recurrent pharyngitis.     This patient is seen in consultation at the request of Dr. Nina Adorno.    All other systems were reviewed and they are either negative or they are not directly pertinent to this Otolaryngology examination.      Past Medical History:    Past Medical History:   Diagnosis Date     Hearing problem      Hypertension      Sensorineural hearing loss      Sleep apnea        Past Surgical History:    No past surgical history on file.    Medications:      Current Outpatient Medications:      amLODIPine (NORVASC) 10 MG tablet, TAKE ONE TABLET BY MOUTH ONE TIME DAILY , Disp: 90 tablet, Rfl: 1     atenolol (TENORMIN) 50 MG tablet, Take 1.5 tablets (75 mg) by mouth daily, Disp: 135 tablet, Rfl: 1     atorvastatin (LIPITOR) 40 MG tablet, Take 1 tablet (40 mg) by mouth daily, Disp: 90 tablet, Rfl: 1     ciprofloxacin-dexamethasone (CIPRODEX) 0.3-0.1 % otic suspension, 2 drops each ear BID X 14 days supply, Disp: 1 Bottle, Rfl: 1     hydrochlorothiazide (HYDRODIURIL) 25 MG tablet, Take 1 tablet (25 mg) by mouth daily, Disp: 90 tablet, Rfl: 3     losartan (COZAAR) 100 MG tablet, Take 1 tablet (100 mg) by mouth daily, Disp: 90 tablet, Rfl: 1     sertraline (ZOLOFT) 50 MG tablet, Take 1 tablet (50 mg) by  mouth daily, Disp: 90 tablet, Rfl: 3     traZODone (DESYREL) 50 MG tablet, Take 1-2 tablets ( mg) by mouth nightly as needed for sleep, Disp: 180 tablet, Rfl: 3    Allergies:    Patient has no known allergies.    Physical Examination:    The patient is a well developed well nourished male in no apparent distress.  He is normocephalic, atraumatic with pupils round an reactive to light.    Oral Cavity Examination:  Normal mucosa with no masses or lesions  Nasal Examination: Normal mucosa with no masses or lesions  Ear Examination: Crusted, chapped ear canal skin bilaterally with purulent material in the ear canals.  Normal tympanic membranes and middle ear spaces bilaterally. Ear canals are cleaned with a suction bilaterally.   Neurological Examination: Facial nerve function intact and symmetric  Integumentary Examination: No lesions on the skin of the head and neck  Neck Examination:  No masses or lesions, no lymphadenopathy  Endocrine Examination:  Normal thyroid examination    Assessment and Plan:    The patient presents with a history of sensorineural hearing loss and otitis externa.  He will be treated with Ciprodex Otic drops and he will be seen again in two weeks to assess his progress with an Audiogram and Tympanogram after his follow up visit.     CC: Nina Adorno    Again, thank you for allowing me to participate in the care of your patient.      Sincerely,    Anson Crawford MD

## 2019-07-26 ENCOUNTER — OFFICE VISIT (OUTPATIENT)
Dept: AUDIOLOGY | Facility: CLINIC | Age: 64
End: 2019-07-26
Payer: COMMERCIAL

## 2019-07-26 ENCOUNTER — OFFICE VISIT (OUTPATIENT)
Dept: OTOLARYNGOLOGY | Facility: CLINIC | Age: 64
End: 2019-07-26
Payer: COMMERCIAL

## 2019-07-26 VITALS
HEART RATE: 60 BPM | BODY MASS INDEX: 29.35 KG/M2 | RESPIRATION RATE: 18 BRPM | SYSTOLIC BLOOD PRESSURE: 138 MMHG | TEMPERATURE: 98.3 F | WEIGHT: 187 LBS | DIASTOLIC BLOOD PRESSURE: 78 MMHG | OXYGEN SATURATION: 97 % | HEIGHT: 67 IN

## 2019-07-26 DIAGNOSIS — H90.3 SENSORINEURAL HEARING LOSS, BILATERAL: Primary | ICD-10-CM

## 2019-07-26 DIAGNOSIS — H60.393 INFECTIVE OTITIS EXTERNA, BILATERAL: Primary | ICD-10-CM

## 2019-07-26 RX ORDER — CLOTRIMAZOLE 1 %
CREAM (GRAM) TOPICAL
Qty: 2 G | Refills: 1 | Status: SHIPPED | OUTPATIENT
Start: 2019-07-26 | End: 2024-05-22

## 2019-07-26 ASSESSMENT — PAIN SCALES - GENERAL: PAINLEVEL: NO PAIN (0)

## 2019-07-26 ASSESSMENT — MIFFLIN-ST. JEOR: SCORE: 1596.98

## 2019-07-26 NOTE — LETTER
7/26/2019       RE: Ming Garnett  [address] [place] [address]     Dear Colleague,    Thank you for referring your patient, Ming Garnett, to the [facility] EAR NOSE AND THROAT at [facility]. Please see a copy of my visit note below.    The patient presents with a history of otorrhea from both ears. The patient reports that his ears feel much improved with medical therapy over the past few weeks. The patient denies sinusitis, rhinitis, facial pain, nasal obstruction or purulent nasal discharge. The patient denies chronic or recurrent tonsillitis, chronic or recurrent pharyngitis.    All other systems were reviewed and they are either negative or they are not directly pertinent to this Otolaryngology examination.      Past Medical History:    Past Medical History:   Diagnosis Date     Hearing problem      Hypertension      Sensorineural hearing loss      Sleep apnea        Past Surgical History:    No past surgical history on file.    Medications:      Current Outpatient Medications:      amLODIPine (NORVASC) 10 MG tablet, TAKE ONE TABLET BY MOUTH ONE TIME DAILY , Disp: 90 tablet, Rfl: 1     atenolol (TENORMIN) 50 MG tablet, Take 1.5 tablets (75 mg) by mouth daily, Disp: 135 tablet, Rfl: 1     atorvastatin (LIPITOR) 40 MG tablet, Take 1 tablet (40 mg) by mouth daily, Disp: 90 tablet, Rfl: 1     hydrochlorothiazide (HYDRODIURIL) 25 MG tablet, Take 1 tablet (25 mg) by mouth daily, Disp: 90 tablet, Rfl: 3     losartan (COZAAR) 100 MG tablet, Take 1 tablet (100 mg) by mouth daily, Disp: 90 tablet, Rfl: 1     sertraline (ZOLOFT) 50 MG tablet, Take 1 tablet (50 mg) by mouth daily, Disp: 90 tablet, Rfl: 3     traZODone (DESYREL) 50 MG tablet, Take 1-2 tablets ( mg) by mouth nightly as needed for sleep, Disp: 180 tablet, Rfl: 3    Allergies:    Patient has no known allergies.    Physical Examination:    The patient is a well developed well nourished male in no apparent  distress.  He is normocephalic, atraumatic with pupils round an reactive to light.    Oral Cavity Examination:  Normal mucosa with no masses or lesions  Nasal Examination: Normal mucosa with no masses or lesions  Ear Examination: Ear canals are clear. Chapped skin in the conchal bowls bilaterally.  Normal tympanic membranes and middle ear spaces bilaterally. Ear canals are cleaned with a suction bilaterally.   Neurological Examination: Facial nerve function intact and symmetric  Integumentary Examination: No lesions on the skin of the head and neck    Assessment and Plan:    The patient presents with a history of sensorineural hearing loss and otitis externa. His ear canals infections are clear. He will use lomitrim cream in the bowl of the ears twice weekly for one month and he will proceed with his Audiogram and Tympanogram today. He will be seen again in three months.     CC: Nina Adorno    Again, thank you for allowing me to participate in the care of your patient.      Sincerely,    Anson Crawford MD

## 2019-07-26 NOTE — PATIENT INSTRUCTIONS
1.  You were seen in the ENT Clinic today by Dr. Crawford.  If you have any questions or concerns after your appointment, please call [phone]. Press option #1 for scheduling related needs. Press option #3 for Nurse advice.    2.  Plan is to return to clinic in 3 months, or sooner with concerns.      Cierra Muhammad LPN  [facility] Otolaryngology  [phone]      The patient presents with a history of sensorineural hearing loss and otitis externa. His ear canals infections are clear. He will use lomitrim cream in the bowl of the ears twice weekly for one month and he will proceed with his Audiogram and Tympanogram today. He will be seen again in three months.

## 2019-07-26 NOTE — PROGRESS NOTES
AUDIOLOGY REPORT    SUMMARY: Audiology visit completed. See audiogram for results.      RECOMMENDATIONS: Follow-up with ENT.      Lissette Braun  Audiologist  [place] License  #[id number]

## 2019-07-26 NOTE — PROGRESS NOTES
The patient presents with a history of otorrhea from both ears. The patient reports that his ears feel much improved with medical therapy over the past few weeks. The patient denies sinusitis, rhinitis, facial pain, nasal obstruction or purulent nasal discharge. The patient denies chronic or recurrent tonsillitis, chronic or recurrent pharyngitis.    All other systems were reviewed and they are either negative or they are not directly pertinent to this Otolaryngology examination.      Past Medical History:    Past Medical History:   Diagnosis Date     Hearing problem      Hypertension      Sensorineural hearing loss      Sleep apnea        Past Surgical History:    No past surgical history on file.    Medications:      Current Outpatient Medications:      amLODIPine (NORVASC) 10 MG tablet, TAKE ONE TABLET BY MOUTH ONE TIME DAILY , Disp: 90 tablet, Rfl: 1     atenolol (TENORMIN) 50 MG tablet, Take 1.5 tablets (75 mg) by mouth daily, Disp: 135 tablet, Rfl: 1     atorvastatin (LIPITOR) 40 MG tablet, Take 1 tablet (40 mg) by mouth daily, Disp: 90 tablet, Rfl: 1     hydrochlorothiazide (HYDRODIURIL) 25 MG tablet, Take 1 tablet (25 mg) by mouth daily, Disp: 90 tablet, Rfl: 3     losartan (COZAAR) 100 MG tablet, Take 1 tablet (100 mg) by mouth daily, Disp: 90 tablet, Rfl: 1     sertraline (ZOLOFT) 50 MG tablet, Take 1 tablet (50 mg) by mouth daily, Disp: 90 tablet, Rfl: 3     traZODone (DESYREL) 50 MG tablet, Take 1-2 tablets ( mg) by mouth nightly as needed for sleep, Disp: 180 tablet, Rfl: 3    Allergies:    Patient has no known allergies.    Physical Examination:    The patient is a well developed well nourished male in no apparent distress.  He is normocephalic, atraumatic with pupils round an reactive to light.    Oral Cavity Examination:  Normal mucosa with no masses or lesions  Nasal Examination: Normal mucosa with no masses or lesions  Ear Examination: Ear canals are clear. Chapped skin in the conchal bowls  bilaterally.  Normal tympanic membranes and middle ear spaces bilaterally. Ear canals are cleaned with a suction bilaterally.   Neurological Examination: Facial nerve function intact and symmetric  Integumentary Examination: No lesions on the skin of the head and neck    Assessment and Plan:    The patient presents with a history of sensorineural hearing loss and otitis externa. His ear canals infections are clear. He will use lomitrim cream in the bowl of the ears twice weekly for one month and he will proceed with his Audiogram and Tympanogram today. He will be seen again in three months.     CC: Nina Adorno

## 2019-07-26 NOTE — NURSING NOTE
"Chief Complaint   Patient presents with     RECHECK     bilateral otorrhea      Blood pressure 138/78, pulse 60, temperature 98.3  F (36.8  C), resp. rate 18, height 1.71 m (5' 7.32\"), weight 84.8 kg (187 lb), SpO2 97 %.    Beto Damon LPN    "

## 2019-10-11 ENCOUNTER — TELEPHONE (OUTPATIENT)
Dept: OTOLARYNGOLOGY | Facility: CLINIC | Age: 64
End: 2019-10-11

## 2019-10-11 NOTE — TELEPHONE ENCOUNTER
Called patient to schedule:    Appt Notes: Bilat Otorrhea - 3 mo follow-up - No WIN   Provider: Dr. Crawford   LOV: 07/26/19     LVM with patient and wife with scheduling instructions, and provided the ENT call center number to call back and schedule

## 2019-11-11 DIAGNOSIS — I10 BENIGN ESSENTIAL HYPERTENSION: ICD-10-CM

## 2019-11-12 RX ORDER — ATENOLOL 50 MG/1
TABLET ORAL
Qty: 0.1 TABLET | Refills: 0 | OUTPATIENT
Start: 2019-11-12

## 2019-11-12 NOTE — TELEPHONE ENCOUNTER
"Requested Prescriptions   Pending Prescriptions Disp Refills     atenolol (TENORMIN) 50 MG tablet [Pharmacy Med Name: ATENOLOL 50 MG Tablet] 135 tablet 0     Sig: TAKE 1 AND 1/2 TABLETS EVERY DAY       Beta-Blockers Protocol Passed - 11/11/2019  4:33 PM        Passed - Blood pressure under 140/90 in past 12 months     BP Readings from Last 3 Encounters:   07/26/19 138/78   07/12/19 141/82   06/03/19 130/69                 Passed - Patient is age 6 or older        Passed - Recent (12 mo) or future (30 days) visit within the authorizing provider's specialty     Patient has had an office visit with the authorizing provider or a provider within the authorizing providers department within the previous 12 mos or has a future within next 30 days. See \"Patient Info\" tab in inbasket, or \"Choose Columns\" in Meds & Orders section of the refill encounter.              Passed - Medication is active on med list        Requesting refill >30 days in advance    Refused Prescriptions:                       Disp   Refills    atenolol (TENORMIN) 50 MG tablet [Pharmacy*0.1 ta*0        Sig: TAKE 1 AND 1/2 TABLETS EVERY DAY  Refused By: ROSELYN HENDRIX  Reason for Refusal: Patient has requested refill too soon      "

## 2019-11-15 ENCOUNTER — OFFICE VISIT (OUTPATIENT)
Dept: ORTHOPEDICS | Facility: CLINIC | Age: 64
End: 2019-11-15
Payer: COMMERCIAL

## 2019-11-15 VITALS — WEIGHT: 192 LBS | BODY MASS INDEX: 29.1 KG/M2 | HEIGHT: 68 IN

## 2019-11-15 DIAGNOSIS — M25.562 CHRONIC PAIN OF LEFT KNEE: ICD-10-CM

## 2019-11-15 DIAGNOSIS — G89.29 CHRONIC PAIN OF LEFT KNEE: ICD-10-CM

## 2019-11-15 DIAGNOSIS — M17.12 PRIMARY OSTEOARTHRITIS OF LEFT KNEE: Primary | ICD-10-CM

## 2019-11-15 RX ORDER — DIAZEPAM 5 MG
10 TABLET ORAL
Qty: 2 TABLET | Refills: 0 | Status: SHIPPED | OUTPATIENT
Start: 2019-11-15 | End: 2020-01-13

## 2019-11-15 ASSESSMENT — MIFFLIN-ST. JEOR: SCORE: 1630.41

## 2019-11-15 NOTE — PROGRESS NOTES
"ESTABLISHED PATIENT FOLLOW-UP:  Follow Up of the Left Leg       HISTORY OF PRESENT ILLNESS  Mr. Garnett is a pleasant 63 year old year old male who presents to clinic today for follow-up of left knee. Patient mentions that he has been noticing symptoms in his knees for the past couple months.  He says he is interested in following up with a knee specialist as well as completing his MRI (states that he would require a light sedative for the MRI).  Also notes pain of right knee starting.     Interval History:     Follow up reason: Increased pain over the past 11 months.    Date of injury: 12/3/18    Date last seen: 12/4/18    Following Therapeutic Plan: Yes     Pain: Worsening    Function: Worsening    Treatment to date:  Therapy exercises  Hyaluronic acid injections both knees  Corticosteroid injections both knees  Activity modifications  NSAIDs    Notes he will be interested in total knee arthroplasty at this point if needed.  Moderate medial JSN on previous xrays.     Additional medical/Social/Surgical histories reviewed in [facility] and updated as appropriate.    REVIEW OF SYSTEMS (11/15/2019)  CONSTITUTIONAL: Denies fever and weight loss  GASTROINTESTINAL: Denies abdominal pain, nausea, vomiting  MUSCULOSKELETAL: See HPI  SKIN: Denies any recent rash or lesion  NEUROLOGICAL: Denies numbness or focal weakness     PHYSICAL EXAM  Ht 1.727 m (5' 8\")   Wt 87.1 kg (192 lb)   BMI 29.19 kg/m      General  - normal appearance, in no obvious distress  CV  - normal popliteal pulse  Pulm  - normal respiratory pattern, non-labored  Musculoskeletal - left knee  - stance: mildly antalgic gait, genu varum  - inspection: generalized swelling, trace effusion  - palpation: medial joint line tenderness, patella and patellar tendon non-tender, normal popliteal pulse  - ROM: 100 degrees flexion, 0 degrees extension, painful active ROM  - strength: 5/5 in flexion, 5/5 in extension  - neuro: no sensory or motor deficit  - special " tests:  (-) Lachman  (-) anterior drawer  (-) posterior drawer  (+) Negin  (-) varus at 0 and 30 degrees flexion  (-) valgus at 0 and 30 degrees flexion  Neuro  - no sensory or motor deficit, grossly normal coordination, normal muscle tone  Skin  - no ecchymosis, erythema, warmth, or induration, no obvious rash  Psych  - interactive, appropriate, normal mood and affect        IMAGING :   Exam: 3 views of the left knee dated 12/4/2018.     COMPARISON: None.     CLINICAL HISTORY: Left knee pain.     FINDINGS: AP, lateral, and axial views of the left knee were obtained.  Moderate to large size left knee joint effusion. Joint space narrowing  in the medial femorotibial joint compartment of the left knee. The  patella is well aligned.                                                                      IMPRESSION: Moderate to large size left knee joint effusion with joint  space narrowing in the medial femorotibial joint compartment of the  left knee.     HAJA JOHNSON MD     ASSESSMENT & PLAN  Mr. Garnett is a 63 year old year old male who presents to clinic today for ongoing chronic left knee pain over the past year.  It has not responded favorably in the past to corticosteroid, hyaluronic acid injections and has previously been told by physician at [place] that he should proceed with arthroplasty. He would like a second opinion on this Will order MRI given pain and swelling out of proportion to perceived OA on xrays.  Will refer on to orthopedic knee surgeon for consideration of TKA if appropriate.    It was a pleasure seeing Cristobal Estes DO, [facility]  Primary Care Sports Medicine

## 2019-11-15 NOTE — LETTER
11/15/2019       RE: Ming Garnett  [address] [place] [address]     Dear Colleague,    Thank you for referring your patient, Ming Garnett, to the [facility] SPORTS AND ORTHOPAEDIC WALK IN CLINIC at [facility]. Please see a copy of my visit note below.          SPORTS & ORTHOPEDIC WALK-IN FOLLOW-UP VISIT 11/15/2019    Mentions that he has been noticing Sx in his knees for the past couple months.  He says he is interested in following up with a knee specialist as well as completing his MRI (states that he would require a light sedative for the MRI).    Interval History:     Follow up reason: Increased pain    Date of injury: 12/3/18    Date last seen: 12/4/18    Following Therapeutic Plan: Yes     Pain: Worsening    Function: Worsening    Medical History:    Any recent changes to your medical history? No    Any new medication prescribed since last visit? No    Review of Systems:    Do you have fever, chills, weight loss? No    Do you have any vision problems? No    Do you have any chest pain or edema? No    Do you have any shortness of breath or wheezing?  No    Do you have stomach problems? No    Do you have any numbness or focal weakness? No    Do you have diabetes? No    Do you have problems with bleeding or clotting? No    Do you have an rashes or other skin lesions? No             ESTABLISHED PATIENT FOLLOW-UP:  Follow Up of the Left Leg       HISTORY OF PRESENT ILLNESS  Mr. Garnett is a pleasant 63 year old year old male who presents to clinic today for follow-up of left knee. Patient mentions that he has been noticing symptoms in his knees for the past couple months.  He says he is interested in following up with a knee specialist as well as completing his MRI (states that he would require a light sedative for the MRI).  Also notes pain of right knee starting.     Interval History:     Follow up reason: Increased pain over the past 11 months.    Date of  "injury: 12/3/18    Date last seen: 12/4/18    Following Therapeutic Plan: Yes     Pain: Worsening    Function: Worsening    Treatment to date:  Therapy exercises  Hyaluronic acid injections both knees  Corticosteroid injections both knees  Activity modifications  NSAIDs    Notes he will be interested in total knee arthroplasty at this point if needed.  Moderate medial JSN on previous xrays.     Additional medical/Social/Surgical histories reviewed in [facility] and updated as appropriate.    REVIEW OF SYSTEMS (11/15/2019)  CONSTITUTIONAL: Denies fever and weight loss  GASTROINTESTINAL: Denies abdominal pain, nausea, vomiting  MUSCULOSKELETAL: See HPI  SKIN: Denies any recent rash or lesion  NEUROLOGICAL: Denies numbness or focal weakness     PHYSICAL EXAM  Ht 1.727 m (5' 8\")   Wt 87.1 kg (192 lb)   BMI 29.19 kg/m       General  - normal appearance, in no obvious distress  CV  - normal popliteal pulse  Pulm  - normal respiratory pattern, non-labored  Musculoskeletal - left knee  - stance: mildly antalgic gait, genu varum  - inspection: generalized swelling, trace effusion  - palpation: medial joint line tenderness, patella and patellar tendon non-tender, normal popliteal pulse  - ROM: 100 degrees flexion, 0 degrees extension, painful active ROM  - strength: 5/5 in flexion, 5/5 in extension  - neuro: no sensory or motor deficit  - special tests:  (-) Lachman  (-) anterior drawer  (-) posterior drawer  (+) Negin  (-) varus at 0 and 30 degrees flexion  (-) valgus at 0 and 30 degrees flexion  Neuro  - no sensory or motor deficit, grossly normal coordination, normal muscle tone  Skin  - no ecchymosis, erythema, warmth, or induration, no obvious rash  Psych  - interactive, appropriate, normal mood and affect        IMAGING :   Exam: 3 views of the left knee dated 12/4/2018.     COMPARISON: None.     CLINICAL HISTORY: Left knee pain.     FINDINGS: AP, lateral, and axial views of the left knee were obtained.  Moderate to " large size left knee joint effusion. Joint space narrowing  in the medial femorotibial joint compartment of the left knee. The  patella is well aligned.                                                                      IMPRESSION: Moderate to large size left knee joint effusion with joint  space narrowing in the medial femorotibial joint compartment of the  left knee.     HAJA JOHNSON MD     ASSESSMENT & PLAN  Mr. Garnett is a 63 year old year old male who presents to clinic today for ongoing chronic left knee pain over the past year.  It has not responded favorably in the past to corticosteroid, hyaluronic acid injections and has previously been told by physician at [place] that he should proceed with arthroplasty. He would like a second opinion on this Will order MRI given pain and swelling out of proportion to perceived OA on xrays.  Will refer on to orthopedic knee surgeon for consideration of TKA if appropriate.    It was a pleasure seeing Cristobal Estes DO, [facility]  Primary Care Sports Medicine

## 2019-11-15 NOTE — PROGRESS NOTES
SPORTS & ORTHOPEDIC WALK-IN FOLLOW-UP VISIT 11/15/2019    Mentions that he has been noticing Sx in his knees for the past couple months.  He says he is interested in following up with a knee specialist as well as completing his MRI (states that he would require a light sedative for the MRI).    Interval History:     Follow up reason: Increased pain    Date of injury: 12/3/18    Date last seen: 12/4/18    Following Therapeutic Plan: Yes     Pain: Worsening    Function: Worsening    Medical History:    Any recent changes to your medical history? No    Any new medication prescribed since last visit? No    Review of Systems:    Do you have fever, chills, weight loss? No    Do you have any vision problems? No    Do you have any chest pain or edema? No    Do you have any shortness of breath or wheezing?  No    Do you have stomach problems? No    Do you have any numbness or focal weakness? No    Do you have diabetes? No    Do you have problems with bleeding or clotting? No    Do you have an rashes or other skin lesions? No

## 2019-11-16 NOTE — TELEPHONE ENCOUNTER
DIAGNOSIS: Left knee total arthroplasty consult   APPOINTMENT DATE: 12/19/19   NOTES STATUS DETAILS   OFFICE NOTE from referring provider Internal    OFFICE NOTE from other specialist N/A    DISCHARGE SUMMARY from hospital N/A    DISCHARGE REPORT from the ER N/A    OPERATIVE REPORT N/A    MEDICATION LIST Internal    IMPLANT RECORD/STICKER N/A    LABS     CBC/DIFF Internal    CULTURES N/A    INJECTIONS DONE IN RADIOLOGY N/A    MRI N/A Has one scheduled 11/20/19    CT SCAN N/A    XRAYS (IMAGES & REPORTS) Internal 12/04/18   TUMOR     PATHOLOGY  Slides & report N/A

## 2019-11-20 ENCOUNTER — ANCILLARY PROCEDURE (OUTPATIENT)
Dept: MRI IMAGING | Facility: CLINIC | Age: 64
End: 2019-11-20
Attending: FAMILY MEDICINE
Payer: COMMERCIAL

## 2019-11-20 DIAGNOSIS — M25.562 CHRONIC PAIN OF LEFT KNEE: ICD-10-CM

## 2019-11-20 DIAGNOSIS — G89.29 CHRONIC PAIN OF LEFT KNEE: ICD-10-CM

## 2019-12-03 DIAGNOSIS — I10 BENIGN ESSENTIAL HYPERTENSION: ICD-10-CM

## 2019-12-05 NOTE — TELEPHONE ENCOUNTER
"Requested Prescriptions   Pending Prescriptions Disp Refills     atenolol (TENORMIN) 50 MG tablet [Pharmacy Med Name: ATENOLOL 50 MG Tablet]  Last Written Prescription Date:  7-1-19  Last Fill Quantity: 135 tab,  # refills: 1   Last office visit: 6/3/2019 with prescribing provider:  GIANFRANCO GIVENS    Future Office Visit:   135 tablet 0     Sig: TAKE 1 AND 1/2 TABLETS EVERY DAY       Beta-Blockers Protocol Passed - 12/3/2019  2:16 PM        Passed - Blood pressure under 140/90 in past 12 months     BP Readings from Last 3 Encounters:   07/26/19 138/78   07/12/19 141/82   06/03/19 130/69           Passed - Patient is age 6 or older        Passed - Recent (12 mo) or future (30 days) visit within the authorizing provider's specialty     Patient has had an office visit with the authorizing provider or a provider within the authorizing providers department within the previous 12 mos or has a future within next 30 days. See \"Patient Info\" tab in inbasket, or \"Choose Columns\" in Meds & Orders section of the refill encounter.            Passed - Medication is active on med list         "

## 2019-12-07 RX ORDER — ATENOLOL 50 MG/1
TABLET ORAL
Qty: 135 TABLET | Refills: 1 | Status: SHIPPED | OUTPATIENT
Start: 2019-12-07 | End: 2020-06-08

## 2019-12-07 NOTE — TELEPHONE ENCOUNTER
Prescription approved per [facility] Refill Protocol.  Samara Tovar RN

## 2019-12-13 ENCOUNTER — DOCUMENTATION ONLY (OUTPATIENT)
Dept: CARE COORDINATION | Facility: CLINIC | Age: 64
End: 2019-12-13

## 2019-12-13 DIAGNOSIS — M25.562 LEFT KNEE PAIN, UNSPECIFIED CHRONICITY: Primary | ICD-10-CM

## 2019-12-17 ENCOUNTER — THERAPY VISIT (OUTPATIENT)
Dept: PHYSICAL THERAPY | Facility: CLINIC | Age: 64
End: 2019-12-17
Attending: FAMILY MEDICINE
Payer: COMMERCIAL

## 2019-12-17 DIAGNOSIS — M25.562 LEFT KNEE PAIN, UNSPECIFIED CHRONICITY: ICD-10-CM

## 2019-12-17 PROCEDURE — 97110 THERAPEUTIC EXERCISES: CPT | Mod: GP | Performed by: PHYSICAL THERAPIST

## 2019-12-17 PROCEDURE — 97530 THERAPEUTIC ACTIVITIES: CPT | Mod: GP | Performed by: PHYSICAL THERAPIST

## 2019-12-17 PROCEDURE — 97161 PT EVAL LOW COMPLEX 20 MIN: CPT | Mod: GP | Performed by: PHYSICAL THERAPIST

## 2019-12-17 NOTE — PROGRESS NOTES
Physical Therapy Initial Evaluation  December 17, 2019     Precautions/Restrictions/MD instructions: PT eval and treat.     Subjective:   Date of Onset: 2012  C/C: left posterior and medial knee pain at the joint line. He also reports recent onset of left sided low back pain.   Quality of pain is dull, aching and sharp. Pains are described as intermittent in nature. Pain is worse: on the go, morning. Pain is rated 2-10/10.   History of symptoms: Pains began gradually as the result of unknown origins. He reports he thinks that it may be related to his occupation as a . His pain in his back came on suddenly after shoveling 2 weeks ago and has changed his gait. Since onset, symptoms are unchanged at the knee. Previous episodes: recurrent knee pain for years  Worsened by: squatting, standing, lifting, bending  Alleviated by: rest, ice, chiropractic care.    General health as reported by patient: good  Pertinent medical/surgical history: HTN. He denies night pain, significant current illness or recent hospital admission. He denies any regional implanted devices. He denies history of surgery in the area.  Imaging: x-ray and MRI. Current occupational status: retired , now does does wood work and yard work. Patient's goals are: decrease pain, improve tolerance to walking. Return to MD:  PRN.     Objective:  KNEE:    PROM:   L  R   Extension Lacking 2 from full extension Lacking 12 from full extension   Flexion 134 134     Strength:   L R   HIP     Flex 5/5 5/5   ER 4+/5 5/5   Abd 4-/5 4/5   KNEE     Flex 5/5 5/5   Ext 4+/5 5/5   Great toe extension: 5/5  Dorsiflexion:5/5  Hip PROM:     L R   IR 10 10   ER 45 45     Gait: Ambulates with left sided compensated [place], slow david and reduced step length. This improves following PT.      Lumbar screen:    AROM: (Major, Moderate, Minimal or Nil loss)  Movement Loss Lucas Mod Min Nil Pain   Flexion    x    Extension  x      Side Gliding L   x      Side Gliding R  x      Dermatomes: Intact to light touch sensation in all LE dermatomes      Assessment/Plan:    The patient is a 64 year old male with chief complaint of left knee pain complicated by low back pain.    The patient has the following significant findings with corresponding treatment plan.  Diagnosis 1:  Left knee pain    Pain -  hot/cold therapy, manual therapy, splint/taping/bracing/orthotics, self management, directional preference exercise and home program  Decreased ROM/flexibility - manual therapy and therapeutic exercise  Decreased strength - therapeutic exercise and therapeutic activities  Impaired balance - neuro re-education and therapeutic activities  Decreased proprioception - neuro re-education and therapeutic activities  Impaired gait - gait training  Impaired muscle performance - neuro re-education  Decreased function - therapeutic activities    Therapy Evaluation Codes:   1) History comprised of:   Personal factors that impact the plan of care:      Time since onset of symptoms.    Comorbidity factors that impact the plan of care are:      High blood pressure and recently quit smoking.     Medications impacting care: Anti-depressant and High blood pressure.  2) Examination of Body Systems comprised of:   Body structures and functions that impact the plan of care:      Knee and Lumbar spine.   Activity limitations that impact the plan of care are:      Bending, Lifting, Sitting, Squatting/kneeling, Standing and Walking.   Clinical presentation characteristics are:    Stable/Uncomplicated.  3) Presentation comprised of:   Presentation scored as Low complexity with uncomplicated characteristics..  4) Decision-Making    Low complexity using standardized patient assessment instrument and/or measureable assessment of functional outcome.  Cumulative Therapy Evaluation is: Low complexity.    Previous and current functional limitations:  (See Goal Flow Sheet for this information)    Short  term and Long term goals: (See Goal Flow Sheet for this information)     Communication ability:  Patient appears to be able to clearly communicate and understand verbal and written communication and follow directions correctly.  Treatment Explanation - The following has been discussed with the patient: RX ordered/plan of care, anticipated outcomes, and possible risks and side effects.  This patient would benefit from PT intervention to resume normal activities.   Rehab potential is good.    Frequency:  1 X week, once daily  Duration:  for 6 weeks  Discharge Plan: Achieve all LTGs, be independent in home treatment program, and reach maximal therapeutic benefit.    Please refer to the daily flowsheet for treatment today, total treatment time and time spent performing 1:1 timed codes.

## 2019-12-18 DIAGNOSIS — M25.562 LEFT KNEE PAIN: Primary | ICD-10-CM

## 2019-12-18 DIAGNOSIS — M54.50 ACUTE LOW BACK PAIN, UNSPECIFIED BACK PAIN LATERALITY, UNSPECIFIED WHETHER SCIATICA PRESENT: Primary | ICD-10-CM

## 2019-12-19 ENCOUNTER — PRE VISIT (OUTPATIENT)
Dept: ORTHOPEDICS | Facility: CLINIC | Age: 64
End: 2019-12-19

## 2019-12-19 ENCOUNTER — ANCILLARY PROCEDURE (OUTPATIENT)
Dept: GENERAL RADIOLOGY | Facility: CLINIC | Age: 64
End: 2019-12-19
Attending: ORTHOPAEDIC SURGERY
Payer: COMMERCIAL

## 2019-12-19 ASSESSMENT — PATIENT HEALTH QUESTIONNAIRE - PHQ9: SUM OF ALL RESPONSES TO PHQ QUESTIONS 1-9: 0

## 2019-12-26 ENCOUNTER — THERAPY VISIT (OUTPATIENT)
Dept: PHYSICAL THERAPY | Facility: CLINIC | Age: 64
End: 2019-12-26
Attending: FAMILY MEDICINE
Payer: COMMERCIAL

## 2019-12-26 DIAGNOSIS — M54.50 ACUTE LOW BACK PAIN, UNSPECIFIED BACK PAIN LATERALITY, UNSPECIFIED WHETHER SCIATICA PRESENT: ICD-10-CM

## 2019-12-26 PROCEDURE — 97110 THERAPEUTIC EXERCISES: CPT | Mod: GP | Performed by: PHYSICAL THERAPIST

## 2019-12-26 PROCEDURE — 97530 THERAPEUTIC ACTIVITIES: CPT | Mod: GP | Performed by: PHYSICAL THERAPIST

## 2019-12-26 ASSESSMENT — ACTIVITIES OF DAILY LIVING (ADL)
LIMPING: THE SYMPTOM AFFECTS MY ACTIVITY SLIGHTLY
SIT WITH YOUR KNEE BENT: ACTIVITY IS NOT DIFFICULT
WEAKNESS: THE SYMPTOM AFFECTS MY ACTIVITY SLIGHTLY
GIVING WAY, BUCKLING OR SHIFTING OF KNEE: I HAVE THE SYMPTOM BUT IT DOES NOT AFFECT MY ACTIVITY
PAIN: I DO NOT HAVE THE SYMPTOM
KNEE_ACTIVITY_OF_DAILY_LIVING_SCORE: 80
KNEEL ON THE FRONT OF YOUR KNEE: ACTIVITY IS MINIMALLY DIFFICULT
GO UP STAIRS: ACTIVITY IS MINIMALLY DIFFICULT
AS_A_RESULT_OF_YOUR_KNEE_INJURY,_HOW_WOULD_YOU_RATE_YOUR_CURRENT_LEVEL_OF_DAILY_ACTIVITY?: NEARLY NORMAL
GO DOWN STAIRS: ACTIVITY IS MINIMALLY DIFFICULT
RAW_SCORE: 56
STAND: ACTIVITY IS MINIMALLY DIFFICULT
SQUAT: ACTIVITY IS MINIMALLY DIFFICULT
WALK: ACTIVITY IS MINIMALLY DIFFICULT
HOW_WOULD_YOU_RATE_THE_CURRENT_FUNCTION_OF_YOUR_KNEE_DURING_YOUR_USUAL_DAILY_ACTIVITIES_ON_A_SCALE_FROM_0_TO_100_WITH_100_BEING_YOUR_LEVEL_OF_KNEE_FUNCTION_PRIOR_TO_YOUR_INJURY_AND_0_BEING_THE_INABILITY_TO_PERFORM_ANY_OF_YOUR_USUAL_DAILY_ACTIVITIES?: 70
HOW_WOULD_YOU_RATE_THE_OVERALL_FUNCTION_OF_YOUR_KNEE_DURING_YOUR_USUAL_DAILY_ACTIVITIES?: NEARLY NORMAL
SWELLING: I DO NOT HAVE THE SYMPTOM
STIFFNESS: THE SYMPTOM AFFECTS MY ACTIVITY SLIGHTLY
RISE FROM A CHAIR: ACTIVITY IS MINIMALLY DIFFICULT
KNEE_ACTIVITY_OF_DAILY_LIVING_SUM: 56

## 2020-01-02 ENCOUNTER — THERAPY VISIT (OUTPATIENT)
Dept: PHYSICAL THERAPY | Facility: CLINIC | Age: 65
End: 2020-01-02
Payer: COMMERCIAL

## 2020-01-02 ENCOUNTER — OFFICE VISIT (OUTPATIENT)
Dept: ORTHOPEDICS | Facility: CLINIC | Age: 65
End: 2020-01-02
Attending: FAMILY MEDICINE
Payer: COMMERCIAL

## 2020-01-02 VITALS — BODY MASS INDEX: 29.1 KG/M2 | HEIGHT: 68 IN | WEIGHT: 192 LBS

## 2020-01-02 DIAGNOSIS — M25.562 CHRONIC PAIN OF LEFT KNEE: Primary | ICD-10-CM

## 2020-01-02 DIAGNOSIS — M25.562 LEFT KNEE PAIN, UNSPECIFIED CHRONICITY: ICD-10-CM

## 2020-01-02 DIAGNOSIS — G89.29 CHRONIC PAIN OF LEFT KNEE: Primary | ICD-10-CM

## 2020-01-02 DIAGNOSIS — M54.50 ACUTE LOW BACK PAIN, UNSPECIFIED BACK PAIN LATERALITY, UNSPECIFIED WHETHER SCIATICA PRESENT: Primary | ICD-10-CM

## 2020-01-02 DIAGNOSIS — I10 BENIGN ESSENTIAL HYPERTENSION: ICD-10-CM

## 2020-01-02 DIAGNOSIS — Z91.89 FRAMINGHAM CARDIAC RISK >20% IN NEXT 10 YEARS: ICD-10-CM

## 2020-01-02 LAB
ALBUMIN SERPL-MCNC: 3.8 G/DL (ref 3.4–5)
ALP SERPL-CCNC: 95 U/L (ref 40–150)
ALT SERPL W P-5'-P-CCNC: 53 U/L (ref 0–70)
ANION GAP SERPL CALCULATED.3IONS-SCNC: 8 MMOL/L (ref 3–14)
AST SERPL W P-5'-P-CCNC: 28 U/L (ref 0–45)
BILIRUB SERPL-MCNC: 0.5 MG/DL (ref 0.2–1.3)
BUN SERPL-MCNC: 29 MG/DL (ref 7–30)
CALCIUM SERPL-MCNC: 9.5 MG/DL (ref 8.5–10.1)
CHLORIDE SERPL-SCNC: 104 MMOL/L (ref 94–109)
CHOLEST SERPL-MCNC: 177 MG/DL
CO2 SERPL-SCNC: 24 MMOL/L (ref 20–32)
CREAT SERPL-MCNC: 1.51 MG/DL (ref 0.66–1.25)
GFR SERPL CREATININE-BSD FRML MDRD: 47 ML/MIN/{1.73_M2}
GLUCOSE SERPL-MCNC: 105 MG/DL (ref 70–99)
HDLC SERPL-MCNC: 45 MG/DL
LDLC SERPL CALC-MCNC: 74 MG/DL
NONHDLC SERPL-MCNC: 132 MG/DL
POTASSIUM SERPL-SCNC: 3.5 MMOL/L (ref 3.4–5.3)
PROT SERPL-MCNC: 7.9 G/DL (ref 6.8–8.8)
SODIUM SERPL-SCNC: 136 MMOL/L (ref 133–144)
TRIGL SERPL-MCNC: 289 MG/DL

## 2020-01-02 PROCEDURE — 80061 LIPID PANEL: CPT | Performed by: FAMILY MEDICINE

## 2020-01-02 PROCEDURE — 80053 COMPREHEN METABOLIC PANEL: CPT | Performed by: FAMILY MEDICINE

## 2020-01-02 PROCEDURE — 97140 MANUAL THERAPY 1/> REGIONS: CPT | Mod: GP | Performed by: PHYSICAL THERAPIST

## 2020-01-02 PROCEDURE — 36415 COLL VENOUS BLD VENIPUNCTURE: CPT | Performed by: FAMILY MEDICINE

## 2020-01-02 PROCEDURE — 97110 THERAPEUTIC EXERCISES: CPT | Mod: GP | Performed by: PHYSICAL THERAPIST

## 2020-01-02 ASSESSMENT — ACTIVITIES OF DAILY LIVING (ADL)
LIMPING: I DO NOT HAVE THE SYMPTOM
HOW_WOULD_YOU_RATE_THE_CURRENT_FUNCTION_OF_YOUR_KNEE_DURING_YOUR_USUAL_DAILY_ACTIVITIES_ON_A_SCALE_FROM_0_TO_100_WITH_100_BEING_YOUR_LEVEL_OF_KNEE_FUNCTION_PRIOR_TO_YOUR_INJURY_AND_0_BEING_THE_INABILITY_TO_PERFORM_ANY_OF_YOUR_USUAL_DAILY_ACTIVITIES?: 85
KNEEL ON THE FRONT OF YOUR KNEE: ACTIVITY IS MINIMALLY DIFFICULT
STAND: ACTIVITY IS NOT DIFFICULT
WEAKNESS: I HAVE THE SYMPTOM BUT IT DOES NOT AFFECT MY ACTIVITY
KNEE_ACTIVITY_OF_DAILY_LIVING_SUM: 64
RAW_SCORE: 64
STIFFNESS: I HAVE THE SYMPTOM BUT IT DOES NOT AFFECT MY ACTIVITY
WALK: ACTIVITY IS NOT DIFFICULT
SIT WITH YOUR KNEE BENT: ACTIVITY IS NOT DIFFICULT
SWELLING: I HAVE THE SYMPTOM BUT IT DOES NOT AFFECT MY ACTIVITY
KNEE_ACTIVITY_OF_DAILY_LIVING_SCORE: 91.43
RISE FROM A CHAIR: ACTIVITY IS NOT DIFFICULT
GO DOWN STAIRS: ACTIVITY IS NOT DIFFICULT
HOW_WOULD_YOU_RATE_THE_OVERALL_FUNCTION_OF_YOUR_KNEE_DURING_YOUR_USUAL_DAILY_ACTIVITIES?: NEARLY NORMAL
PAIN: I HAVE THE SYMPTOM BUT IT DOES NOT AFFECT MY ACTIVITY
GO UP STAIRS: ACTIVITY IS MINIMALLY DIFFICULT
SQUAT: ACTIVITY IS NOT DIFFICULT
FUNCTION,_DAILY_LIVING_SCORE: 95.58
GIVING WAY, BUCKLING OR SHIFTING OF KNEE: I DO NOT HAVE THE SYMPTOM
AS_A_RESULT_OF_YOUR_KNEE_INJURY,_HOW_WOULD_YOU_RATE_YOUR_CURRENT_LEVEL_OF_DAILY_ACTIVITY?: NEARLY NORMAL

## 2020-01-02 ASSESSMENT — PATIENT HEALTH QUESTIONNAIRE - PHQ9
SUM OF ALL RESPONSES TO PHQ QUESTIONS 1-9: 2
10. IF YOU CHECKED OFF ANY PROBLEMS, HOW DIFFICULT HAVE THESE PROBLEMS MADE IT FOR YOU TO DO YOUR WORK, TAKE CARE OF THINGS AT HOME, OR GET ALONG WITH OTHER PEOPLE: NOT DIFFICULT AT ALL
SUM OF ALL RESPONSES TO PHQ QUESTIONS 1-9: 2

## 2020-01-02 ASSESSMENT — MIFFLIN-ST. JEOR: SCORE: 1625.41

## 2020-01-02 ASSESSMENT — KOOS JR: HOW SEVERE IS YOUR KNEE STIFFNESS AFTER FIRST WAKING IN MORNING: MODERATE

## 2020-01-02 NOTE — NURSING NOTE
Teaching Flowsheet   Relevant Diagnosis: Left knee osteoarthritis  Teaching Topic: Left total knee arthroplasty    Patient lives in [facility] with his wife Rhiannon who would be his support person. Health history positive for HTN controlled by Losartan, atenolol, hydrochlorothiazide. Patient is not ready to schedule and wishes to have some time to jordyn the decision to proceed over; he will call if he wishes to move ahead.      Person(s) involved in teaching:   Patient and Wife     Motivation Level:  Asks Questions: Yes  Eager to Learn: Yes  Cooperative: Yes  Receptive (willing/able to accept information): Yes  Any cultural factors/Anglican beliefs that may influence understanding or compliance? No     Patient and Family demonstrates understanding of the following:  Reason for the appointment, diagnosis and treatment plan: Yes  Knowledge of proper use of medications and conditions for which they are ordered (with special attention to potential side effects or drug interactions): Yes  Which situations necessitate calling provider and whom to contact: Yes     Teaching Concerns Addressed: Patient  understands he will need a preop exam within 30 days of surgery if he chooses to proceed. Discussed total joint class.    Proper use and care of assistive devices. (medical equip, care aids, etc.): Yes  Nutritional needs and diet plan: Yes  Pain management techniques: Yes  Wound Care: Yes  How and/when to access community resources: Yes     Instructional Materials Used/Given: Preoperative teaching packet, surgical soap, dental card, total joint class booklet and flyer.

## 2020-01-02 NOTE — PROGRESS NOTES
Chief Complaint: Consult (Discuss Left TKA )      Physician:  Davey Estes    HPI: Ming Garnett is a 64 year old male who presents today for evaluation of left knee pain. Patient has had bilateral knee pain for 8+ years. In the first three years he received 3 CSI b/l that gave significant pain relief for 6-8 months. He has had no knee injections for the past 5 years. He recently started PT that has also helped with his knee pain. Of note, the patient dislikes walking due to knee pain.    Symptom Profile  Location of symptoms:  Medial joint line  Onset: gradual   Trend: getting worse  Duration of symptoms: worse during twisting  Quality of symptoms: aching, sharp/stabbing  Severity: 4-5/10 while twisting   Alleviate: rest  Exacerbating: twisting movements   Previous Treatments: Previous treatments include activity modification, oral pain medication, Physical therapy and Steroid injections    Current Status:  Results of the patient s Hip Disability and Osteoarthritis Outcome Score (HOOS)  are as follows (0-100 scales with 100 being the theoretical best):  Pain: 66.66  Symptoms: 71.42  ADLs: 95.58  Sports/Recreation: 50  Quality of Life: 50      MEDICAL HISTORY:   Past Medical History:   Diagnosis Date     Hearing problem      Hypertension      Sensorineural hearing loss      Sleep apnea        Medications:     Current Outpatient Medications:      amLODIPine (NORVASC) 10 MG tablet, TAKE ONE TABLET BY MOUTH ONE TIME DAILY , Disp: 90 tablet, Rfl: 1     atenolol (TENORMIN) 50 MG tablet, TAKE 1 AND 1/2 TABLETS EVERY DAY, Disp: 135 tablet, Rfl: 1     atorvastatin (LIPITOR) 40 MG tablet, Take 1 tablet (40 mg) by mouth daily, Disp: 90 tablet, Rfl: 1     clotrimazole (LOTRIMIN) 1 % external cream, Apply to affected area BID X 1 month supply, Disp: 2 g, Rfl: 1     diazepam (VALIUM) 5 MG tablet, Take 2 tablets (10 mg) by mouth once as needed for anxiety Pre-procedural anxiolytic for MRI.  Please take 2 tabs 30-60 minutes  prior to MRI.  Avoid taking narcotics in conjunction with this medication.  Avoid alcohol intake 24 hours prior to and after taking this medication., Disp: 2 tablet, Rfl: 0     hydrochlorothiazide (HYDRODIURIL) 25 MG tablet, Take 1 tablet (25 mg) by mouth daily, Disp: 90 tablet, Rfl: 3     losartan (COZAAR) 100 MG tablet, Take 1 tablet (100 mg) by mouth daily, Disp: 90 tablet, Rfl: 1     sertraline (ZOLOFT) 50 MG tablet, Take 1 tablet (50 mg) by mouth daily, Disp: 90 tablet, Rfl: 3     traZODone (DESYREL) 50 MG tablet, Take 1-2 tablets ( mg) by mouth nightly as needed for sleep, Disp: 180 tablet, Rfl: 3    Allergies: Patient has no known allergies.    SURGICAL HISTORY: No past surgical history on file.    FAMILY HISTORY:   Family History   Problem Relation Age of Onset     Breast Cancer Mother      Hypertension Mother      Other Cancer Father      Aneurysm Father      Hypertension Father        SOCIAL HISTORY:   Has quit smoking for the last month   Drinks 2-4 beers each day  No illicit drug use  Retired, prior   Social History     Tobacco Use     Smoking status: Light Tobacco Smoker     Packs/day: 0.50     Years: 1.00     Pack years: 0.50     Types: Cigarettes, Cigars     Start date: 1/9/2000     Smokeless tobacco: Never Used   Substance Use Topics     Alcohol use: Yes     Alcohol/week: 0.0 standard drinks     Comment: a few beers per day        REVIEW OF SYSTEMS:  The comprehensive review of systems from the intake form was reviewed with the patient.  No fever, weight change or fatigue. No dry eyes. No oral ulcers, sore throat or voice change. No palpitations, syncope, angina or edema.  No chest pain, excessive sleepiness, shortness of breath or hemoptysis.   No abdominal pain, nausea, vomiting, diarrhea or heartburn.  No skin rash. No focal weakness or numbness. No bleeding or lymphadenopathy. No rhinitis or hives.     Exam:  On physical examination the patient appears the stated age, is in  "no acute distress, affectThe is appropriate, and breathing is non-labored.  Vitals are documented in the EMR and have been reviewed:    Ht 1.727 m (5' 8\")   Wt 87.1 kg (192 lb)   BMI 29.19 kg/m    5' 8\"  Body mass index is 29.19 kg/m .    Rises from chair: easily   Gait: bilateral flexed knee gait, significatn   Trendelenburg test:  Gains the exam table: easily   Left  Knee  Appearance: benign  Clinical alignment: vaurs  Effusion: moderate   Tenderness to palpation: medial greater than lateral   Extension: -15  Flexion: 120  Collateral ligaments: intact  Cruciate ligaments: grossly intact     Distally, the circulatory, motor, and sensation exam is intact with 5/5 EHL, gastroc-soleus, and tibialis anterior.  Sensation to light touch is intact.  Dorsalis pedis and posterior tibialis pulses are palpable.  There are no sores on the feet, no bruising, and no lymphedema.    X-rays:   L Knee 3 views: reveal medial and patellafemoral joint OA, with severe medial joint space loss    MRI L knee:  IMPRESSION:  1. Small left knee joint effusion with extensive synovial  proliferation as well as a small popliteal cyst.  2. Osteoarthrosis in the left knee with areas of cartilage loss, most  significant in the medial femorotibial joint compartment where there  are areas of full-thickness cartilage loss along both the left knee  medial femoral condyle and medial tibial plateau.  3. Mild cartilage thinning and irregularity in the left knee  patellofemoral joint compartment.  4. Complex degenerative tearing of the left knee medial meniscus.  5. The anterior and posterior cruciate ligaments, medial and lateral  supporting structures, and lateral meniscus are intact.    Assessment: This is a 64 year old with long history of bilateral knee pain L>R. He has had comprehensive non-operative management including activity modification, oral pain medication, physical therapy and multiple injections of steroid and viscosupplementation. We " discussed living with it and total knee. We spent approximately 20 minutes discussing the risks and benefits of total knee arthroplasty.  We reviewed the proposed surgical plan.  The discussion included but was not limited to the following:  Blood clots, blood clots to the lungs, injury to blood vessels and nerves, the literature as it pertains to known problems with leg length discrepancy, stiffness, anterior knee pain, and infection. We discussed the post-operative recovery for the typical patient, return to driving, and return to normal activities.  All the patient's questions were answered to the best of my ability and would like to proceed.    Plan:  Left total knee when the patient chooses to go forward with it.     Answers for HPI/ROS submitted by the patient on 1/2/2020   If you checked off any problems, how difficult have these problems made it for you to do your work, take care of things at home, or get along with other people?: Not difficult at all  General Symptoms: No  Skin Symptoms: No  HENT Symptoms: No  EYE SYMPTOMS: No  HEART SYMPTOMS: No  LUNG SYMPTOMS: No  INTESTINAL SYMPTOMS: No   URINARY SYMPTOMS: No  REPRODUCTIVE SYMPTOMS: No  SKELETAL SYMPTOMS: No  BLOOD SYMPTOMS: No  NERVOUS SYSTEM SYMPTOMS: No  MENTAL HEALTH SYMPTOMS: No

## 2020-01-02 NOTE — NURSING NOTE
"Reason For Visit:   Chief Complaint   Patient presents with     Consult     Discuss Left TKA        Ht 1.727 m (5' 8\")   Wt 87.1 kg (192 lb)   BMI 29.19 kg/m           Oscar Stephen ATC  "

## 2020-01-02 NOTE — LETTER
1/2/2020       RE: Ming Garnett  [address] [place] [address]     Dear Colleague,    Thank you for referring your patient, Ming Garnett, to the [facility] at [facility]. Please see a copy of my visit note below.    Chief Complaint: Consult (Discuss Left TKA )      Physician:  Davey Estes    HPI: Ming Garnett is a 64 year old male who presents today for evaluation of left knee pain. Patient has had bilateral knee pain for 8+ years. In the first three years he received 3 CSI b/l that gave significant pain relief for 6-8 months. He has had no knee injections for the past 5 years. He recently started PT that has also helped with his knee pain. Of note, the patient dislikes walking due to knee pain.    Symptom Profile  Location of symptoms:  Medial joint line  Onset: gradual   Trend: getting worse  Duration of symptoms: worse during twisting  Quality of symptoms: aching, sharp/stabbing  Severity: 4-5/10 while twisting   Alleviate: rest  Exacerbating: twisting movements   Previous Treatments: Previous treatments include activity modification, oral pain medication, Physical therapy and Steroid injections    Current Status:  Results of the patient s Hip Disability and Osteoarthritis Outcome Score (HOOS)  are as follows (0-100 scales with 100 being the theoretical best):  Pain: 66.66  Symptoms: 71.42  ADLs: 95.58  Sports/Recreation: 50  Quality of Life: 50      MEDICAL HISTORY:   Past Medical History:   Diagnosis Date     Hearing problem      Hypertension      Sensorineural hearing loss      Sleep apnea        Medications:     Current Outpatient Medications:      amLODIPine (NORVASC) 10 MG tablet, TAKE ONE TABLET BY MOUTH ONE TIME DAILY , Disp: 90 tablet, Rfl: 1     atenolol (TENORMIN) 50 MG tablet, TAKE 1 AND 1/2 TABLETS EVERY DAY, Disp: 135 tablet, Rfl: 1     atorvastatin (LIPITOR) 40 MG tablet, Take 1 tablet (40 mg) by mouth daily, Disp: 90 tablet,  Rfl: 1     clotrimazole (LOTRIMIN) 1 % external cream, Apply to affected area BID X 1 month supply, Disp: 2 g, Rfl: 1     diazepam (VALIUM) 5 MG tablet, Take 2 tablets (10 mg) by mouth once as needed for anxiety Pre-procedural anxiolytic for MRI.  Please take 2 tabs 30-60 minutes prior to MRI.  Avoid taking narcotics in conjunction with this medication.  Avoid alcohol intake 24 hours prior to and after taking this medication., Disp: 2 tablet, Rfl: 0     hydrochlorothiazide (HYDRODIURIL) 25 MG tablet, Take 1 tablet (25 mg) by mouth daily, Disp: 90 tablet, Rfl: 3     losartan (COZAAR) 100 MG tablet, Take 1 tablet (100 mg) by mouth daily, Disp: 90 tablet, Rfl: 1     sertraline (ZOLOFT) 50 MG tablet, Take 1 tablet (50 mg) by mouth daily, Disp: 90 tablet, Rfl: 3     traZODone (DESYREL) 50 MG tablet, Take 1-2 tablets ( mg) by mouth nightly as needed for sleep, Disp: 180 tablet, Rfl: 3    Allergies: Patient has no known allergies.    SURGICAL HISTORY: No past surgical history on file.    FAMILY HISTORY:   Family History   Problem Relation Age of Onset     Breast Cancer Mother      Hypertension Mother      Other Cancer Father      Aneurysm Father      Hypertension Father        SOCIAL HISTORY:   Has quit smoking for the last month   Drinks 2-4 beers each day  No illicit drug use  Retired, prior   Social History     Tobacco Use     Smoking status: Light Tobacco Smoker     Packs/day: 0.50     Years: 1.00     Pack years: 0.50     Types: Cigarettes, Cigars     Start date: 1/9/2000     Smokeless tobacco: Never Used   Substance Use Topics     Alcohol use: Yes     Alcohol/week: 0.0 standard drinks     Comment: a few beers per day        REVIEW OF SYSTEMS:  The comprehensive review of systems from the intake form was reviewed with the patient.  No fever, weight change or fatigue. No dry eyes. No oral ulcers, sore throat or voice change. No palpitations, syncope, angina or edema.  No chest pain, excessive  "sleepiness, shortness of breath or hemoptysis.   No abdominal pain, nausea, vomiting, diarrhea or heartburn.  No skin rash. No focal weakness or numbness. No bleeding or lymphadenopathy. No rhinitis or hives.     Exam:  On physical examination the patient appears the stated age, is in no acute distress, affectThe is appropriate, and breathing is non-labored.  Vitals are documented in the EMR and have been reviewed:    Ht 1.727 m (5' 8\")   Wt 87.1 kg (192 lb)   BMI 29.19 kg/m     5' 8\"  Body mass index is 29.19 kg/m .    Rises from chair: easily   Gait: bilateral flexed knee gait, significatn   Trendelenburg test:  Gains the exam table: easily   Left  Knee  Appearance: benign  Clinical alignment: vaurs  Effusion: moderate   Tenderness to palpation: medial greater than lateral   Extension: -15  Flexion: 120  Collateral ligaments: intact  Cruciate ligaments: grossly intact     Distally, the circulatory, motor, and sensation exam is intact with 5/5 EHL, gastroc-soleus, and tibialis anterior.  Sensation to light touch is intact.  Dorsalis pedis and posterior tibialis pulses are palpable.  There are no sores on the feet, no bruising, and no lymphedema.    X-rays:   L Knee 3 views: reveal medial and patellafemoral joint OA, with severe medial joint space loss    MRI L knee:  IMPRESSION:  1. Small left knee joint effusion with extensive synovial  proliferation as well as a small popliteal cyst.  2. Osteoarthrosis in the left knee with areas of cartilage loss, most  significant in the medial femorotibial joint compartment where there  are areas of full-thickness cartilage loss along both the left knee  medial femoral condyle and medial tibial plateau.  3. Mild cartilage thinning and irregularity in the left knee  patellofemoral joint compartment.  4. Complex degenerative tearing of the left knee medial meniscus.  5. The anterior and posterior cruciate ligaments, medial and lateral  supporting structures, and lateral " meniscus are intact.    Assessment: This is a 64 year old with long history of bilateral knee pain L>R. He has had comprehensive non-operative management including activity modification, oral pain medication, physical therapy and multiple injections of steroid and viscosupplementation. We discussed living with it and total knee. We spent approximately 20 minutes discussing the risks and benefits of total knee arthroplasty.  We reviewed the proposed surgical plan.  The discussion included but was not limited to the following:  Blood clots, blood clots to the lungs, injury to blood vessels and nerves, the literature as it pertains to known problems with leg length discrepancy, stiffness, anterior knee pain, and infection. We discussed the post-operative recovery for the typical patient, return to driving, and return to normal activities.  All the patient's questions were answered to the best of my ability and would like to proceed.    Plan:  Left total knee when the patient chooses to go forward with it.       Again, thank you for allowing me to participate in the care of your patient.      Sincerely,    Esteban Espino MD

## 2020-01-02 NOTE — PROGRESS NOTES
PROGRESS  REPORT    Progress reporting period is from 12/17/19 to 1/2/20.       SUBJECTIVE  Subjective changes noted by patient:  Ming has attended 3 visits of physical therapy. He reports an 80-90% improvement in symptoms. He is no longer limping. He reports he woke up with right knee swelling about a week ago. This has improved but he is unsure of what brought it on. He reports mild medial knee pain bilaterally.  Current pain level is  2/10.     Changes in function:  Yes (See Goal flowsheet attached for changes in current functional level)  Adverse reaction to treatment or activity: None    OBJECTIVE  Changes noted in objective findings:  Yes,   ROM:    L  R   Extension Lacking 2 from full extension Lacking 2 from full extension   Flexion 130 130      KOS: 91% today    ASSESSMENT/PLAN  Updated problem list and treatment plan: Diagnosis 1:  Knee and low back pain  Pain -  hot/cold therapy, manual therapy, education and home program  Decreased ROM/flexibility - manual therapy and therapeutic exercise  Decreased strength - therapeutic exercise and therapeutic activities  Impaired balance - neuro re-education and therapeutic activities  Impaired gait - gait training  Impaired muscle performance - neuro re-education  STG/LTGs have been met or progress has been made towards goals:  Yes (See Goal flow sheet completed today.)  Assessment of Progress: The patient's condition is improving.  Self Management Plans:  Patient has been instructed in a home treatment program.  I have re-evaluated this patient and find that the nature, scope, duration and intensity of the therapy is appropriate for the medical condition of the patient.  Ming continues to require the following intervention to meet STG and LTG's:  PT    Recommendations:  This patient would benefit from continued therapy.     Frequency:  1 X week, once daily  Duration:  for 1 weeks tapering to 2 X a month over 1 month        Please refer to the daily flowsheet for  treatment today, total treatment time and time spent performing 1:1 timed codes.

## 2020-01-03 ASSESSMENT — PATIENT HEALTH QUESTIONNAIRE - PHQ9: SUM OF ALL RESPONSES TO PHQ QUESTIONS 1-9: 2

## 2020-01-13 ENCOUNTER — ANCILLARY PROCEDURE (OUTPATIENT)
Dept: GENERAL RADIOLOGY | Facility: CLINIC | Age: 65
End: 2020-01-13
Attending: FAMILY MEDICINE
Payer: COMMERCIAL

## 2020-01-13 ENCOUNTER — OFFICE VISIT (OUTPATIENT)
Dept: FAMILY MEDICINE | Facility: CLINIC | Age: 65
End: 2020-01-13
Payer: COMMERCIAL

## 2020-01-13 VITALS
SYSTOLIC BLOOD PRESSURE: 126 MMHG | BODY MASS INDEX: 29.35 KG/M2 | DIASTOLIC BLOOD PRESSURE: 82 MMHG | HEART RATE: 68 BPM | RESPIRATION RATE: 16 BRPM | WEIGHT: 193 LBS | TEMPERATURE: 96.8 F

## 2020-01-13 DIAGNOSIS — K13.70 ORAL MUCOSAL LESION: ICD-10-CM

## 2020-01-13 DIAGNOSIS — R35.1 BENIGN PROSTATIC HYPERPLASIA WITH NOCTURIA: ICD-10-CM

## 2020-01-13 DIAGNOSIS — R30.0 DYSURIA: Primary | ICD-10-CM

## 2020-01-13 DIAGNOSIS — R05.9 COUGH: ICD-10-CM

## 2020-01-13 DIAGNOSIS — N40.1 BENIGN PROSTATIC HYPERPLASIA WITH NOCTURIA: ICD-10-CM

## 2020-01-13 DIAGNOSIS — Z72.0 TOBACCO ABUSE: ICD-10-CM

## 2020-01-13 DIAGNOSIS — N18.30 CKD (CHRONIC KIDNEY DISEASE) STAGE 3, GFR 30-59 ML/MIN (H): ICD-10-CM

## 2020-01-13 LAB
ALBUMIN SERPL-MCNC: 3.9 G/DL (ref 3.4–5)
ALBUMIN UR-MCNC: 30 MG/DL
ALP SERPL-CCNC: 105 U/L (ref 40–150)
ALT SERPL W P-5'-P-CCNC: 38 U/L (ref 0–70)
ANION GAP SERPL CALCULATED.3IONS-SCNC: 4 MMOL/L (ref 3–14)
APPEARANCE UR: CLEAR
AST SERPL W P-5'-P-CCNC: 23 U/L (ref 0–45)
BILIRUB SERPL-MCNC: 0.5 MG/DL (ref 0.2–1.3)
BILIRUB UR QL STRIP: NEGATIVE
BUN SERPL-MCNC: 22 MG/DL (ref 7–30)
CALCIUM SERPL-MCNC: 9.3 MG/DL (ref 8.5–10.1)
CHLORIDE SERPL-SCNC: 104 MMOL/L (ref 94–109)
CO2 SERPL-SCNC: 30 MMOL/L (ref 20–32)
COLOR UR AUTO: YELLOW
CREAT SERPL-MCNC: 1.24 MG/DL (ref 0.66–1.25)
GFR SERPL CREATININE-BSD FRML MDRD: 60 ML/MIN/{1.73_M2}
GLUCOSE SERPL-MCNC: 86 MG/DL (ref 70–99)
GLUCOSE UR STRIP-MCNC: NEGATIVE MG/DL
HGB UR QL STRIP: NEGATIVE
KETONES UR STRIP-MCNC: NEGATIVE MG/DL
LEUKOCYTE ESTERASE UR QL STRIP: NEGATIVE
NITRATE UR QL: NEGATIVE
PH UR STRIP: 6.5 PH (ref 5–7)
POTASSIUM SERPL-SCNC: 3.6 MMOL/L (ref 3.4–5.3)
PROT SERPL-MCNC: 7.9 G/DL (ref 6.8–8.8)
RBC #/AREA URNS AUTO: NORMAL /HPF
SODIUM SERPL-SCNC: 138 MMOL/L (ref 133–144)
SOURCE: ABNORMAL
SP GR UR STRIP: 1.02 (ref 1–1.03)
UROBILINOGEN UR STRIP-ACNC: 0.2 EU/DL (ref 0.2–1)
WBC #/AREA URNS AUTO: NORMAL /HPF

## 2020-01-13 PROCEDURE — 99214 OFFICE O/P EST MOD 30 MIN: CPT | Performed by: FAMILY MEDICINE

## 2020-01-13 PROCEDURE — 81001 URINALYSIS AUTO W/SCOPE: CPT | Performed by: FAMILY MEDICINE

## 2020-01-13 PROCEDURE — 36415 COLL VENOUS BLD VENIPUNCTURE: CPT | Performed by: FAMILY MEDICINE

## 2020-01-13 PROCEDURE — 71046 X-RAY EXAM CHEST 2 VIEWS: CPT

## 2020-01-13 PROCEDURE — 80053 COMPREHEN METABOLIC PANEL: CPT | Performed by: FAMILY MEDICINE

## 2020-01-13 RX ORDER — BUPROPION HYDROCHLORIDE 150 MG/1
150 TABLET ORAL EVERY MORNING
Qty: 90 TABLET | Refills: 1 | Status: SHIPPED | OUTPATIENT
Start: 2020-01-13 | End: 2020-01-13

## 2020-01-13 RX ORDER — TAMSULOSIN HYDROCHLORIDE 0.4 MG/1
0.4 CAPSULE ORAL DAILY
Qty: 90 CAPSULE | Refills: 1 | Status: SHIPPED | OUTPATIENT
Start: 2020-01-13 | End: 2020-01-13

## 2020-01-13 RX ORDER — BUPROPION HYDROCHLORIDE 150 MG/1
150 TABLET ORAL EVERY MORNING
Qty: 30 TABLET | Refills: 0 | Status: SHIPPED | OUTPATIENT
Start: 2020-01-13 | End: 2020-01-16

## 2020-01-13 RX ORDER — TAMSULOSIN HYDROCHLORIDE 0.4 MG/1
0.4 CAPSULE ORAL DAILY
Qty: 30 CAPSULE | Refills: 0 | Status: SHIPPED | OUTPATIENT
Start: 2020-01-13 | End: 2020-07-15

## 2020-01-13 NOTE — PROGRESS NOTES
Subjective     Ming Garnett is a 64 year old male who presents to clinic today for the following health issues:    HPI   Presents today to Follow-up his chronic medical conditions.  Former PCP John    Current Chronic Medical Conditions  HTN  Hyperlipidemia  insomnia  Major depression/JOHANA  Tobacco abuse- using Wellbutrin to remain smoke-free    Social History  .  Lives at home with wife and daughter and grandkids.    Multiple concerns today:    Has had a chronic cough since this fall he cannot shake.  It starts as a tickle in his throat and then creates a dry cough.  No hempoptysis.  No congestion or SOB,    Notes increased nocturia with little urine output when he does get up and go.  This has begun to affect his sleep and increased his fatigue.    Has had oral mucosal lesion on RIGHT upper gum noted by dentist who recommended a biopsy.  Patient cannot afford to pay OOP to see an oral surgeon so is requesting an ENT referral to get this biopsy done under his medical insurance.    Desires refill of Wellbutrin to continue to keep him smokefree and treat his mood effectively.  Has no side effects on this medication.    Recent labs showed great improvement of lipids after starting the statin.  He has had some worsening of his CKD and is here to repeat this test today non-fasting and well-hydrated.    Patient Active Problem List   Diagnosis     Benign essential hypertension     Insomnia, unspecified type     Major depression in complete remission (H)     Erectile dysfunction, unspecified erectile dysfunction type     History reviewed. No pertinent surgical history.    Social History     Tobacco Use     Smoking status: Former Smoker     Packs/day: 0.50     Years: 1.00     Pack years: 0.50     Types: Cigarettes, Cigars     Start date: 2000     Last attempt to quit: 2019     Years since quittin.1     Smokeless tobacco: Never Used   Substance Use Topics     Alcohol use: Yes     Alcohol/week:  0.0 standard drinks     Comment: a few beers per day      Family History   Problem Relation Age of Onset     Breast Cancer Mother      Hypertension Mother      Other Cancer Father      Aneurysm Father      Hypertension Father          Current Outpatient Medications   Medication Sig Dispense Refill     atenolol (TENORMIN) 50 MG tablet TAKE 1 AND 1/2 TABLETS EVERY  tablet 1     atorvastatin (LIPITOR) 40 MG tablet Take 1 tablet (40 mg) by mouth daily 90 tablet 1     clotrimazole (LOTRIMIN) 1 % external cream Apply to affected area BID X 1 month supply 2 g 1     hydrochlorothiazide (HYDRODIURIL) 25 MG tablet Take 1 tablet (25 mg) by mouth daily 90 tablet 3     losartan (COZAAR) 100 MG tablet Take 1 tablet (100 mg) by mouth daily 90 tablet 1     sertraline (ZOLOFT) 50 MG tablet Take 1 tablet (50 mg) by mouth daily 90 tablet 3     tamsulosin (FLOMAX) 0.4 MG capsule Take 1 capsule (0.4 mg) by mouth daily 90 capsule 1     traZODone (DESYREL) 50 MG tablet Take 1-2 tablets ( mg) by mouth nightly as needed for sleep 180 tablet 3     amLODIPine (NORVASC) 10 MG tablet TAKE ONE TABLET BY MOUTH ONE TIME DAILY  90 tablet 1     No Known Allergies  Recent Labs   Lab Test 01/02/20  0855 11/19/18  0955 11/17/17  1400   A1C  --   --  5.4   LDL 74 138* 166*   HDL 45 51 65   TRIG 289* 164* 159*   ALT 53 33 35   CR 1.51* 1.24 1.29*   GFRESTIMATED 47* 59* 56*   GFRESTBLACK 55* 71 68   POTASSIUM 3.5 4.4 4.2      BP Readings from Last 3 Encounters:   01/13/20 126/82   07/26/19 138/78   07/12/19 141/82    Wt Readings from Last 3 Encounters:   01/13/20 87.5 kg (193 lb)   01/02/20 87.1 kg (192 lb)   11/15/19 87.1 kg (192 lb)                    Reviewed and updated as needed this visit by Provider         Review of Systems   ROS COMP: Constitutional, HEENT, cardiovascular, pulmonary, GI, , musculoskeletal, neuro, skin, endocrine and psych systems are negative, except as otherwise noted.      Objective    /82   Pulse 68    Temp 96.8  F (36  C) (Tympanic)   Resp 16   Wt 87.5 kg (193 lb)   BMI 29.35 kg/m    Body mass index is 29.35 kg/m .  Physical Exam   GENERAL: healthy, alert and no distress  EYES: Eyes grossly normal to inspection, PERRL and conjunctivae and sclerae normal  HENT: ear canals and TM's normal, nose and mouth, + postnasal drip, oral mucosal lesion RIGHT upper lateral gum  NECK: no adenopathy, no asymmetry, masses, or scars and thyroid normal to palpation  RESP: lungs clear to auscultation - no rales, rhonchi or wheezes  CV: regular rate and rhythm, normal S1 S2, no S3 or S4, no murmur, click or rub, no peripheral edema and peripheral pulses strong  ABDOMEN: soft, nontender, no hepatosplenomegaly, no masses and bowel sounds normal  MS: no gross musculoskeletal defects noted, no edema  SKIN: no suspicious lesions or rashes  NEURO: Normal strength and tone, mentation intact and speech normal  PSYCH: mentation appears normal, affect normal/bright    Diagnostic Test Results:  Labs reviewed in Epic  Xray - negative per my read        Assessment & Plan     1. Cough    - XR Chest 2 Views; Future    Reassured with normal CXR this appears to be due to postnasal drip- I recommend OTC antihistamine to treat.    2. Dysuria    - *UA reflex to Microscopic and Culture ([facility] (except [place] and [place])  - Urine Microscopic    Reassured UA is negative-- no concern for infection.    3. Benign prostatic hyperplasia with nocturia    - tamsulosin (FLOMAX) 0.4 MG capsule; Take 1 capsule (0.4 mg) by mouth daily  Dispense: 30 capsule; Refill: 0    New nocturia most consistent with BPH-- patient agreeable to trial of FLomax to treat.  Follow-up 1 month for recheck-- sooner prn.    4. Tobacco abuse    - buPROPion (WELLBUTRIN XL) 150 MG 24 hr tablet; Take 1 tablet (150 mg) by mouth every morning  Dispense: 30 tablet; Refill: 0    Desires to continue Zyban to remain smoke-free.    5. Oral mucosal lesion    -  OTOLARYNGOLOGY REFERRAL    Referred to ENT for biopsy at recommendation of his dentist.    6. CKD (chronic kidney disease) stage 3, GFR 30-59 ml/min (H)    - Comprehensive metabolic panel    Recheck after creat bumped up last week with fasting labs--    Nina Adorno MD  [facility]

## 2020-01-16 ENCOUNTER — TELEPHONE (OUTPATIENT)
Dept: FAMILY MEDICINE | Facility: CLINIC | Age: 65
End: 2020-01-16

## 2020-01-16 DIAGNOSIS — F41.1 GAD (GENERALIZED ANXIETY DISORDER): ICD-10-CM

## 2020-01-16 DIAGNOSIS — Z72.0 TOBACCO ABUSE: ICD-10-CM

## 2020-01-16 RX ORDER — BUPROPION HYDROCHLORIDE 150 MG/1
150 TABLET ORAL EVERY MORNING
Qty: 90 TABLET | Refills: 1 | Status: SHIPPED | OUTPATIENT
Start: 2020-01-16 | End: 2021-11-09

## 2020-01-16 NOTE — TELEPHONE ENCOUNTER
Clarification needed please clarify current therapy buPROPion (WELLBUTRIN XL) 150 MG 24 hr tablet or sertraline (ZOLOFT) 50 MG tablet.    Please send new Rx for current treatment plan. Thank you.  
Normal rate, regular rhythm, normal S1, S2 heart sounds heard.

## 2020-01-24 NOTE — TELEPHONE ENCOUNTER
FUTURE VISIT INFORMATION      FUTURE VISIT INFORMATION:    Date: 2/3/20    Time: 8:30 AM    [place] [facility]  REFERRAL INFORMATION:    Referring provider:  Dr. Nina Adorno    Referring providers clinic:  [facility]    Reason for visit/diagnosis: Oral Lesion    RECORDS REQUESTED FROM:       Clinic name Comments Records Status Imaging Status   [facility] 1/13/20 - OV with Dr. Adorno [facility] 7/26/19 - ENT OV with Dr. Crawford (dx: otitis media)

## 2020-02-03 ENCOUNTER — PRE VISIT (OUTPATIENT)
Dept: OTOLARYNGOLOGY | Facility: CLINIC | Age: 65
End: 2020-02-03

## 2020-02-03 ENCOUNTER — OFFICE VISIT (OUTPATIENT)
Dept: OTOLARYNGOLOGY | Facility: CLINIC | Age: 65
End: 2020-02-03
Attending: FAMILY MEDICINE
Payer: COMMERCIAL

## 2020-02-03 VITALS — BODY MASS INDEX: 29.86 KG/M2 | WEIGHT: 197 LBS | HEIGHT: 68 IN

## 2020-02-03 DIAGNOSIS — K13.70 ORAL LESION: Primary | ICD-10-CM

## 2020-02-03 PROCEDURE — 88305 TISSUE EXAM BY PATHOLOGIST: CPT | Performed by: OTOLARYNGOLOGY

## 2020-02-03 RX ORDER — LIDOCAINE HYDROCHLORIDE 20 MG/ML
15 SOLUTION OROPHARYNGEAL ONCE
Status: COMPLETED | OUTPATIENT
Start: 2020-02-03 | End: 2020-02-03

## 2020-02-03 RX ADMIN — LIDOCAINE HYDROCHLORIDE 15 ML: 20 SOLUTION OROPHARYNGEAL at 08:54

## 2020-02-03 ASSESSMENT — PAIN SCALES - GENERAL: PAINLEVEL: NO PAIN (0)

## 2020-02-03 ASSESSMENT — MIFFLIN-ST. JEOR: SCORE: 1648.09

## 2020-02-03 NOTE — PROGRESS NOTES
HISTORY OF PRESENT ILLNESS:  Mr. Garnett is here for the first time. He has noted that he thinks for a couple months, he has had a discoloration over his left maxillary gingiva.  It is a posterior molar where the gingiva above is grayish in discoloration.  He reports that he saw an oral surgeon who suggested it be biopsied, but then is referred here for biopsy.  He denies any pain or bleeding associated with it.  He does have a history of smoking cigarettes.      PHYSICAL EXAMINATION:  He is in no distress, alert and interactive.  Breathing without difficulty or stridor.  I did visualize his mouth.  He does have some grayish discoloration over the gingiva at the secondary maxillary molar on the left side.        PROCEDURE:  Therefore, after informed consent was obtained, the area is topically anesthetized with viscous lidocaine and a biopsy of that mucosa is taken and sent to pathology.  Bleeding was controlled with silver nitrate.  The patient tolerated the procedure well.      ASSESSMENT:  Benign-appearing lesion on the maxillary gingiva, biopsied here today.        PLAN:  We will call him with the results. He can follow up as needed depending upon the results.

## 2020-02-03 NOTE — LETTER
2/3/2020       RE: Ming Garnett  [address] [place] [address]     Dear Colleague,    Thank you for referring your patient, Ming Garnett, to the [facility] at [facility]. Please see a copy of my visit note below.    HISTORY OF PRESENT ILLNESS:  Mr. Garnett is here for the first time. He has noted that he thinks for a couple months, he has had a discoloration over his left maxillary gingiva.  It is a posterior molar where the gingiva above is grayish in discoloration.  He reports that he saw an oral surgeon who suggested it be biopsied, but then is referred here for biopsy.  He denies any pain or bleeding associated with it.  He does have a history of smoking cigarettes.      PHYSICAL EXAMINATION:  He is in no distress, alert and interactive.  Breathing without difficulty or stridor.  I did visualize his mouth.  He does have some grayish discoloration over the gingiva at the secondary maxillary molar on the left side.        PROCEDURE:  Therefore, after informed consent was obtained, the area is topically anesthetized with viscous lidocaine and a biopsy of that mucosa is taken and sent to pathology.  Bleeding was controlled with silver nitrate.  The patient tolerated the procedure well.      ASSESSMENT:  Benign-appearing lesion on the maxillary gingiva, biopsied here today.        PLAN:  We will call him with the results. He can follow up as needed depending upon the results.         Again, thank you for allowing me to participate in the care of your patient.      Sincerely,    Ifeanyi Peace MD

## 2020-02-03 NOTE — NURSING NOTE
Invasive Procedure Safety Checklist  Procedure:  Biopsy of Left maxillary Gingiva    Responsible person(s):  Complete sections as appropriate and electronically sign and date below.    Staff/Provider  Consent documentation on chart:  YES  H&P is not applicable (when straight local anesthesia is used).    Procedure Team  Completed by comparing informed consent documentation, information on the patient record and/or the marked surgical site, and discussion with the patient/guardian.     Verified:  (Select all that apply)  Patient identification (two indicators)  Procedure to be performed  Procedure site and /or laterality and/or level  Consent  Procedure site:  Site marking not requred.  Provider Gerard - Site/Laterality/Level:  N/A  Staff/Provider:  No images    Procedure Team:  *Pause for the Cause* verbal and active participation of team members- verify:  Patient name:  YES  Procedure to be performed:  YES  Site, laterality and level, noting patient position:  YES    Scope used: N/A    Above steps completed as applicable (Electronic Signature, Title, Date):    Dana Villa RN      Note:  Any incidents of wrong patient, wrong procedure, or wrong site are reported using the Occurrence Process already in place.  The occurrence form is required to be completed immediately with this type of event.

## 2020-02-03 NOTE — NURSING NOTE
"Chief Complaint   Patient presents with     Consult     Oral lesion     Height 1.727 m (5' 8\"), weight 89.4 kg (197 lb).    Alina Flynn, EMT    "

## 2020-02-04 LAB — COPATH REPORT: NORMAL

## 2020-03-10 ENCOUNTER — HEALTH MAINTENANCE LETTER (OUTPATIENT)
Age: 65
End: 2020-03-10

## 2020-03-26 NOTE — PROGRESS NOTES
Patient did not return for further treatment and no additional progress was noted.  Please refer to the progress note and goal flowsheet completed on 01/02/20 for discharge information.

## 2020-04-08 DIAGNOSIS — G47.00 INSOMNIA, UNSPECIFIED TYPE: ICD-10-CM

## 2020-04-08 RX ORDER — TRAZODONE HYDROCHLORIDE 50 MG/1
50-100 TABLET, FILM COATED ORAL
Qty: 0.1 TABLET | Refills: 0 | OUTPATIENT
Start: 2020-04-08

## 2020-04-08 NOTE — TELEPHONE ENCOUNTER
"Requested Prescriptions   Pending Prescriptions Disp Refills     traZODone (DESYREL) 50 MG tablet  Last Written Prescription Date:  6-3-19  Last Fill Quantity: 180 tab,  # refills: 3   Last office visit: 1/13/2020 with prescribing provider:  GIANFRANCO GIVENS    Future Office Visit:   180 tablet 3     Sig: Take 1-2 tablets ( mg) by mouth nightly as needed for sleep       Serotonin Modulators Passed - 4/8/2020 10:39 AM        Passed - Recent (12 mo) or future (30 days) visit within the authorizing provider's specialty     Patient has had an office visit with the authorizing provider or a provider within the authorizing providers department within the previous 12 mos or has a future within next 30 days. See \"Patient Info\" tab in inbasket, or \"Choose Columns\" in Meds & Orders section of the refill encounter.            Passed - Medication is active on med list        Passed - Patient is age 18 or older            "

## 2020-04-08 NOTE — TELEPHONE ENCOUNTER
Refused Prescriptions:                       Disp   Refills    traZODone (DESYREL) 50 MG tablet           0.1 ta*0        Sig: Take 1-2 tablets ( mg) by mouth nightly as           needed for sleep  Refused By: ROSELYN HENDRIX  Reason for Refusal: Patient has requested refill too soon

## 2020-04-10 DIAGNOSIS — Z91.89 FRAMINGHAM CARDIAC RISK >20% IN NEXT 10 YEARS: ICD-10-CM

## 2020-04-10 DIAGNOSIS — I10 BENIGN ESSENTIAL HYPERTENSION: ICD-10-CM

## 2020-04-11 NOTE — TELEPHONE ENCOUNTER
"Requested Prescriptions   Pending Prescriptions Disp Refills     losartan (COZAAR) 100 MG tablet [Pharmacy Med Name: LOSARTAN POTASSIUM 100 MG Tablet]  Last Written Prescription Date:  7/1/2019  Last Fill Quantity: 90 tabs,  # refills: 1   Last office visit: 1/13/2020 with prescribing provider:  Kyree   Future Office Visit:   90 tablet 1     Sig: TAKE 1 TABLET EVERY DAY       Angiotensin-II Receptors Passed - 4/10/2020  2:19 PM        Passed - Last blood pressure under 140/90 in past 12 months     BP Readings from Last 3 Encounters:   01/13/20 126/82   07/26/19 138/78   07/12/19 141/82                 Passed - Recent (12 mo) or future (30 days) visit within the authorizing provider's specialty     Patient has had an office visit with the authorizing provider or a provider within the authorizing providers department within the previous 12 mos or has a future within next 30 days. See \"Patient Info\" tab in inbasket, or \"Choose Columns\" in Meds & Orders section of the refill encounter.              Passed - Medication is active on med list        Passed - Patient is age 18 or older        Passed - Normal serum creatinine on file in past 12 months     Recent Labs   Lab Test 01/13/20  1013   CR 1.24       Ok to refill medication if creatinine is low          Passed - Normal serum potassium on file in past 12 months     Recent Labs   Lab Test 01/13/20  1013   POTASSIUM 3.6                       hydrochlorothiazide (HYDRODIURIL) 25 MG tablet [Pharmacy Med Name: HYDROCHLOROTHIAZIDE 25 MG Tablet]  Last Written Prescription Date:  6/3/2019  Last Fill Quantity: 90 tabs,  # refills: 3   Last office visit: 1/13/2020 with prescribing provider:  Kyree   Future Office Visit:   90 tablet 3     Sig: TAKE 1 TABLET (25 MG) BY MOUTH DAILY       Diuretics (Including Combos) Protocol Passed - 4/10/2020  2:19 PM        Passed - Blood pressure under 140/90 in past 12 months     BP Readings from Last 3 Encounters:   01/13/20 126/82 " "  07/26/19 138/78   07/12/19 141/82                 Passed - Recent (12 mo) or future (30 days) visit within the authorizing provider's specialty     Patient has had an office visit with the authorizing provider or a provider within the authorizing providers department within the previous 12 mos or has a future within next 30 days. See \"Patient Info\" tab in inbasket, or \"Choose Columns\" in Meds & Orders section of the refill encounter.              Passed - Medication is active on med list        Passed - Patient is age 18 or older        Passed - Normal serum creatinine on file in past 12 months     Recent Labs   Lab Test 01/13/20  1013   CR 1.24              Passed - Normal serum potassium on file in past 12 months     Recent Labs   Lab Test 01/13/20  1013   POTASSIUM 3.6                    Passed - Normal serum sodium on file in past 12 months     Recent Labs   Lab Test 01/13/20  1013                    atorvastatin (LIPITOR) 40 MG tablet [Pharmacy Med Name: ATORVASTATIN CALCIUM 40 MG Tablet]  Last Written Prescription Date:  7/1/2019  Last Fill Quantity: 90 tabs,  # refills: 1   Last office visit: 1/13/2020 with prescribing provider:  Kyree   Future Office Visit:   90 tablet 1     Sig: TAKE 1 TABLET (40 MG) BY MOUTH DAILY       Statins Protocol Passed - 4/10/2020  2:19 PM        Passed - LDL on file in past 12 months     Recent Labs   Lab Test 01/02/20  0855   LDL 74             Passed - No abnormal creatine kinase in past 12 months     No lab results found.             Passed - Recent (12 mo) or future (30 days) visit within the authorizing provider's specialty     Patient has had an office visit with the authorizing provider or a provider within the authorizing providers department within the previous 12 mos or has a future within next 30 days. See \"Patient Info\" tab in inbasket, or \"Choose Columns\" in Meds & Orders section of the refill encounter.              Passed - Medication is active on med " "list        Passed - Patient is age 18 or older           amLODIPine (NORVASC) 10 MG tablet [Pharmacy Med Name: AMLODIPINE BESYLATE 10 MG Tablet]  Last Written Prescription Date:  7/1/2019  Last Fill Quantity: 90 tabs,  # refills: 1   Last office visit: 1/13/2020 with prescribing provider:  Kyree   Future Office Visit:   90 tablet 1     Sig: TAKE 1 TABLET (10 MG) BY MOUTH DAILY       Calcium Channel Blockers Protocol  Passed - 4/10/2020  2:19 PM        Passed - Blood pressure under 140/90 in past 12 months     BP Readings from Last 3 Encounters:   01/13/20 126/82   07/26/19 138/78   07/12/19 141/82                 Passed - Recent (12 mo) or future (30 days) visit within the authorizing provider's specialty     Patient has had an office visit with the authorizing provider or a provider within the authorizing providers department within the previous 12 mos or has a future within next 30 days. See \"Patient Info\" tab in inbasket, or \"Choose Columns\" in Meds & Orders section of the refill encounter.              Passed - Medication is active on med list        Passed - Patient is age 18 or older        Passed - Normal serum creatinine on file in past 12 months     Recent Labs   Lab Test 01/13/20  1013   CR 1.24       Ok to refill medication if creatinine is low              "

## 2020-04-12 RX ORDER — ATORVASTATIN CALCIUM 40 MG/1
40 TABLET, FILM COATED ORAL DAILY
Qty: 90 TABLET | Refills: 2 | Status: SHIPPED | OUTPATIENT
Start: 2020-04-12 | End: 2020-11-24

## 2020-04-12 RX ORDER — AMLODIPINE BESYLATE 10 MG/1
TABLET ORAL
Qty: 90 TABLET | Refills: 2 | Status: SHIPPED | OUTPATIENT
Start: 2020-04-12 | End: 2020-09-15

## 2020-04-12 RX ORDER — LOSARTAN POTASSIUM 100 MG/1
TABLET ORAL
Qty: 90 TABLET | Refills: 2 | Status: SHIPPED | OUTPATIENT
Start: 2020-04-12 | End: 2020-11-24

## 2020-04-12 RX ORDER — HYDROCHLOROTHIAZIDE 25 MG/1
25 TABLET ORAL DAILY
Qty: 90 TABLET | Refills: 2 | Status: SHIPPED | OUTPATIENT
Start: 2020-04-12 | End: 2020-11-24

## 2020-04-12 NOTE — TELEPHONE ENCOUNTER
Prescription approved per [facility].  Samara Tovar RN

## 2020-05-02 DIAGNOSIS — G47.00 INSOMNIA, UNSPECIFIED TYPE: ICD-10-CM

## 2020-05-05 RX ORDER — TRAZODONE HYDROCHLORIDE 50 MG/1
50-100 TABLET, FILM COATED ORAL
Qty: 180 TABLET | Refills: 0 | Status: SHIPPED | OUTPATIENT
Start: 2020-05-05 | End: 2020-09-15

## 2020-05-05 NOTE — TELEPHONE ENCOUNTER
"Requested Prescriptions   Pending Prescriptions Disp Refills     traZODone (DESYREL) 50 MG tablet [Pharmacy Med Name: TRAZODONE HYDROCHLORIDE 50 MG Tablet] 180 tablet 3     Sig: TAKE 1-2 TABLETS ( MG) BY MOUTH NIGHTLY AS NEEDED FOR SLEEP  Last Written Prescription Date:  06/03/2019  Last Fill Quantity: 180 tablet,  # refills: 3   Last Office Visit: 1/13/2020 Kyree  Future Office Visit:            Serotonin Modulators Passed - 5/2/2020  4:54 PM        Passed - Recent (12 mo) or future (30 days) visit within the authorizing provider's specialty     Patient has had an office visit with the authorizing provider or a provider within the authorizing providers department within the previous 12 mos or has a future within next 30 days. See \"Patient Info\" tab in inbasket, or \"Choose Columns\" in Meds & Orders section of the refill encounter.              Passed - Medication is active on med list        Passed - Patient is age 18 or older           "

## 2020-06-04 DIAGNOSIS — I10 BENIGN ESSENTIAL HYPERTENSION: ICD-10-CM

## 2020-06-08 RX ORDER — ATENOLOL 50 MG/1
TABLET ORAL
Qty: 135 TABLET | Refills: 0 | Status: SHIPPED | OUTPATIENT
Start: 2020-06-08 | End: 2020-07-30

## 2020-06-26 DIAGNOSIS — G47.00 INSOMNIA, UNSPECIFIED TYPE: ICD-10-CM

## 2020-06-30 RX ORDER — TRAZODONE HYDROCHLORIDE 50 MG/1
TABLET, FILM COATED ORAL
Qty: 180 TABLET | Refills: 0 | OUTPATIENT
Start: 2020-06-30

## 2020-06-30 NOTE — TELEPHONE ENCOUNTER
Duplicate see script sent 5/5/2020 traZODone (DESYREL) 50 MG tablet  180 tablet  should have enough meds until 8/5/2020

## 2020-07-29 ENCOUNTER — TRANSFERRED RECORDS (OUTPATIENT)
Dept: HEALTH INFORMATION MANAGEMENT | Facility: CLINIC | Age: 65
End: 2020-07-29

## 2020-07-29 DIAGNOSIS — I10 BENIGN ESSENTIAL HYPERTENSION: ICD-10-CM

## 2020-07-29 LAB — HEMOCCULT STL QL IA: NEGATIVE

## 2020-07-29 NOTE — LETTER
August 2, 2020      Ming Ennisak  [address] [place] [address]        Liane Lai,      We received a refill request for atenolol (TENORMIN) 50 MG tablet. We were able to approve the request and sent it to your pharmacy; however, you are due for a med check visit to receive further refills. Please call us at [phone] at your earliest convenience to schedule an appointment.       We greatly appreciate the opportunity to serve you and thank you for trusting us with your health care.        Your health care team at [facility]           Sincerely,        Nina Adorno MD

## 2020-07-30 RX ORDER — ATENOLOL 50 MG/1
TABLET ORAL
Qty: 135 TABLET | Refills: 0 | Status: SHIPPED | OUTPATIENT
Start: 2020-07-30 | End: 2020-09-15

## 2020-08-02 NOTE — TELEPHONE ENCOUNTER
BERRY to call back and schedule a med check visit. Informed pt of approved med and sent to pharmacy. Sent [employer] message and letter.      Morena DIANE  [facility]

## 2020-09-06 DIAGNOSIS — N40.1 BENIGN PROSTATIC HYPERPLASIA WITH NOCTURIA: ICD-10-CM

## 2020-09-06 DIAGNOSIS — R35.1 BENIGN PROSTATIC HYPERPLASIA WITH NOCTURIA: ICD-10-CM

## 2020-09-07 NOTE — TELEPHONE ENCOUNTER
Routing refill request to provider for review/approval because:  Sangita given x1 and patient did not follow up, please advise

## 2020-09-08 RX ORDER — TAMSULOSIN HYDROCHLORIDE 0.4 MG/1
CAPSULE ORAL
Qty: 90 CAPSULE | Refills: 0 | Status: SHIPPED | OUTPATIENT
Start: 2020-09-08 | End: 2020-09-15

## 2020-09-15 ENCOUNTER — OFFICE VISIT (OUTPATIENT)
Dept: FAMILY MEDICINE | Facility: CLINIC | Age: 65
End: 2020-09-15
Payer: COMMERCIAL

## 2020-09-15 VITALS
OXYGEN SATURATION: 98 % | TEMPERATURE: 97 F | HEART RATE: 67 BPM | WEIGHT: 190 LBS | RESPIRATION RATE: 20 BRPM | HEIGHT: 68 IN | DIASTOLIC BLOOD PRESSURE: 88 MMHG | SYSTOLIC BLOOD PRESSURE: 142 MMHG | BODY MASS INDEX: 28.79 KG/M2

## 2020-09-15 DIAGNOSIS — Z12.5 SCREENING FOR PROSTATE CANCER: ICD-10-CM

## 2020-09-15 DIAGNOSIS — Z00.00 ENCOUNTER FOR MEDICARE ANNUAL WELLNESS EXAM: Primary | ICD-10-CM

## 2020-09-15 DIAGNOSIS — G47.00 INSOMNIA, UNSPECIFIED TYPE: ICD-10-CM

## 2020-09-15 DIAGNOSIS — E78.5 HYPERLIPIDEMIA LDL GOAL <130: ICD-10-CM

## 2020-09-15 DIAGNOSIS — F32.0 MILD MAJOR DEPRESSION (H): ICD-10-CM

## 2020-09-15 DIAGNOSIS — I10 BENIGN ESSENTIAL HYPERTENSION: ICD-10-CM

## 2020-09-15 DIAGNOSIS — R35.0 BENIGN PROSTATIC HYPERPLASIA WITH URINARY FREQUENCY: ICD-10-CM

## 2020-09-15 DIAGNOSIS — L30.9 ECZEMA, UNSPECIFIED TYPE: ICD-10-CM

## 2020-09-15 DIAGNOSIS — N40.1 BENIGN PROSTATIC HYPERPLASIA WITH URINARY FREQUENCY: ICD-10-CM

## 2020-09-15 LAB
ALBUMIN SERPL-MCNC: 4 G/DL (ref 3.4–5)
ALP SERPL-CCNC: 105 U/L (ref 40–150)
ALT SERPL W P-5'-P-CCNC: 41 U/L (ref 0–70)
ANION GAP SERPL CALCULATED.3IONS-SCNC: 5 MMOL/L (ref 3–14)
AST SERPL W P-5'-P-CCNC: 27 U/L (ref 0–45)
BILIRUB SERPL-MCNC: 0.5 MG/DL (ref 0.2–1.3)
BUN SERPL-MCNC: 19 MG/DL (ref 7–30)
CALCIUM SERPL-MCNC: 9.8 MG/DL (ref 8.5–10.1)
CHLORIDE SERPL-SCNC: 106 MMOL/L (ref 94–109)
CHOLEST SERPL-MCNC: 161 MG/DL
CO2 SERPL-SCNC: 25 MMOL/L (ref 20–32)
CREAT SERPL-MCNC: 1.25 MG/DL (ref 0.66–1.25)
GFR SERPL CREATININE-BSD FRML MDRD: 59 ML/MIN/{1.73_M2}
GLUCOSE SERPL-MCNC: 88 MG/DL (ref 70–99)
HDLC SERPL-MCNC: 59 MG/DL
LDLC SERPL CALC-MCNC: 73 MG/DL
NONHDLC SERPL-MCNC: 102 MG/DL
POTASSIUM SERPL-SCNC: 4.1 MMOL/L (ref 3.4–5.3)
PROT SERPL-MCNC: 8.1 G/DL (ref 6.8–8.8)
PSA SERPL-ACNC: 2.12 UG/L (ref 0–4)
SODIUM SERPL-SCNC: 136 MMOL/L (ref 133–144)
TRIGL SERPL-MCNC: 146 MG/DL

## 2020-09-15 PROCEDURE — G0103 PSA SCREENING: HCPCS | Performed by: FAMILY MEDICINE

## 2020-09-15 PROCEDURE — 80053 COMPREHEN METABOLIC PANEL: CPT | Performed by: FAMILY MEDICINE

## 2020-09-15 PROCEDURE — 80061 LIPID PANEL: CPT | Performed by: FAMILY MEDICINE

## 2020-09-15 PROCEDURE — 36415 COLL VENOUS BLD VENIPUNCTURE: CPT | Performed by: FAMILY MEDICINE

## 2020-09-15 PROCEDURE — 99214 OFFICE O/P EST MOD 30 MIN: CPT | Mod: 25 | Performed by: FAMILY MEDICINE

## 2020-09-15 PROCEDURE — G0438 PPPS, INITIAL VISIT: HCPCS | Performed by: FAMILY MEDICINE

## 2020-09-15 RX ORDER — FINASTERIDE 5 MG/1
5 TABLET, FILM COATED ORAL DAILY
Qty: 30 TABLET | Refills: 1 | Status: SHIPPED | OUTPATIENT
Start: 2020-09-15 | End: 2020-09-15

## 2020-09-15 RX ORDER — TRIAMCINOLONE ACETONIDE 1 MG/G
OINTMENT TOPICAL
COMMUNITY
Start: 2020-03-25 | End: 2020-09-15

## 2020-09-15 RX ORDER — TRIAMCINOLONE ACETONIDE 1 MG/G
OINTMENT TOPICAL
Qty: 80 G | Refills: 3 | Status: SHIPPED | OUTPATIENT
Start: 2020-09-15 | End: 2020-12-31

## 2020-09-15 RX ORDER — TRIAMCINOLONE ACETONIDE 1 MG/G
OINTMENT TOPICAL
Qty: 80 G | Refills: 3 | Status: SHIPPED | OUTPATIENT
Start: 2020-09-15 | End: 2020-09-15

## 2020-09-15 RX ORDER — FINASTERIDE 5 MG/1
5 TABLET, FILM COATED ORAL DAILY
Qty: 30 TABLET | Refills: 1 | Status: SHIPPED | OUTPATIENT
Start: 2020-09-15 | End: 2020-12-17

## 2020-09-15 RX ORDER — TRAZODONE HYDROCHLORIDE 50 MG/1
50-100 TABLET, FILM COATED ORAL
Qty: 180 TABLET | Refills: 3 | Status: SHIPPED | OUTPATIENT
Start: 2020-09-15 | End: 2021-05-20

## 2020-09-15 RX ORDER — ATENOLOL 50 MG/1
50 TABLET ORAL DAILY
Qty: 90 TABLET | Refills: 1
Start: 2020-09-15 | End: 2020-10-20

## 2020-09-15 ASSESSMENT — ENCOUNTER SYMPTOMS
ABDOMINAL PAIN: 0
COUGH: 0
NERVOUS/ANXIOUS: 1
EYE PAIN: 0
ARTHRALGIAS: 1
SORE THROAT: 0
CONSTIPATION: 0
HEADACHES: 0
SHORTNESS OF BREATH: 0
DIARRHEA: 0
FREQUENCY: 1
DIZZINESS: 0
PALPITATIONS: 0
HEARTBURN: 0
JOINT SWELLING: 0
MYALGIAS: 1
CHILLS: 0
HEMATURIA: 0
DYSURIA: 0
PARESTHESIAS: 0
HEMATOCHEZIA: 0
NAUSEA: 0
FEVER: 0

## 2020-09-15 ASSESSMENT — MIFFLIN-ST. JEOR: SCORE: 1616.33

## 2020-09-15 ASSESSMENT — PATIENT HEALTH QUESTIONNAIRE - PHQ9: SUM OF ALL RESPONSES TO PHQ QUESTIONS 1-9: 5

## 2020-09-15 ASSESSMENT — ACTIVITIES OF DAILY LIVING (ADL): CURRENT_FUNCTION: NO ASSISTANCE NEEDED

## 2020-09-15 NOTE — PATIENT INSTRUCTIONS
Patient Education   Personalized Prevention Plan  You are due for the preventive services outlined below.  Your care team is available to assist you in scheduling these services.  If you have already completed any of these items, please share that information with your care team to update in your medical record.  Health Maintenance Due   Topic Date Due     Zoster (Shingles) Vaccine (1 of 2) 11/21/2003     AORTIC ANEURYSM SCREENING (SYSTEM ASSIGNED)  11/21/2018     Annual Wellness Visit  11/19/2019     Pneumococcal Vaccine (2 of 2 - PPSV23) 11/30/2019     Depression Assessment  07/02/2020     FALL RISK ASSESSMENT  07/12/2020     Flu Vaccine (1) 09/01/2020     Colorectal Cancer Screening  07/30/2020         DUE for FLU VACCINE and PCV 23

## 2020-09-15 NOTE — PROGRESS NOTES
"SUBJECTIVE:   Ming Garnett is a 64 year old male who presents for Preventive Visit.  Are you in the first 12 months of your Medicare coverage?  No    Healthy Habits:     In general, how would you rate your overall health?  Excellent    Frequency of exercise:  1 day/week    Duration of exercise:  Less than 15 minutes    Do you usually eat at least 4 servings of fruit and vegetables a day, include whole grains    & fiber and avoid regularly eating high fat or \"junk\" foods?  Yes    Taking medications regularly:  Yes    Medication side effects:  None    Ability to successfully perform activities of daily living:  No assistance needed    Home Safety:  No safety concerns identified    Hearing Impairment:  Difficulty understanding soft or whispered speech    In the past 6 months, have you been bothered by leaking of urine? Yes    In general, how would you rate your overall mental or emotional health?  Good      PHQ-2 Total Score: 0    Additional concerns today:  No    Do you feel safe in your environment? Yes    Have you ever done Advance Care Planning? (For example, a Health Directive, POLST, or a discussion with a medical provider or your loved ones about your wishes): No, advance care planning information given to patient to review.  Patient plans to discuss their wishes with loved ones or provider.        Fall risk  Fallen 2 or more times in the past year?: No  Any fall with injury in the past year?: No    Cognitive Screening   1) Repeat 3 items (Leader, Season, Table)    2) Clock draw: NORMAL  3) 3 item recall: Recalls 2 objects   Results: NORMAL clock, 1-2 items recalled: COGNITIVE IMPAIRMENT LESS LIKELY    Mini-CogTM Copyright HENRY Cruz. Licensed by the author for use in [facility]; reprinted with permission (greg@.[facility]). All rights reserved.      Do you have sleep apnea, excessive snoring or daytime drowsiness?: in the past    Reviewed and updated as needed this visit by clinical staff     "     Reviewed and updated as needed this visit by Provider    Current Chronic Medical Conditions  HTN  Hyperlipidemia  insomnia  Major depression/JOHANA  Tobacco abuse- using Wellbutrin to remain smoke-free  BPH  OA of B knees- holding off on replacements during COVID     Social History  .  Lives at home with wife and daughter and grandkids.  Enjoys woodworking as hobby in [employer].        Additional Concerns  Backed off amlodipine only using 50 mg instead of 75 mg with good results on home reads.  Needs refill of meds today and CMP with CKD.  On statin.  Lipids today.  Insomnia.  Bothered by BPH and increased nighttime urination.  Using trazodone.  Stopped Flomax.  Would like to try something else and be referred to Urology prn.  B knee OA-- needs disability placard- cannot walk > 1 block without needing to stop due to pain and radiation into back.    Brother passed in past 2 weeks from [place]-- only brother.  One sister.  Grieving.    Social History     Tobacco Use     Smoking status: Former Smoker     Packs/day: 0.50     Years: 1.00     Pack years: 0.50     Types: Cigarettes, Cigars     Start date: 2000     Last attempt to quit: 2019     Years since quittin.8     Smokeless tobacco: Never Used   Substance Use Topics     Alcohol use: Yes     Alcohol/week: 0.0 standard drinks     Comment: a few beers per day      If you drink alcohol do you typically have >3 drinks per day or >7 drinks per week? Yes      Alcohol Use 9/15/2020   Prescreen: >3 drinks/day or >7 drinks/week? Yes   Prescreen: >3 drinks/day or >7 drinks/week? -   AUDIT SCORE  5     AUDIT - Alcohol Use Disorders Identification Test - Reproduced from the World Health Organization Audit 2001 (Second Edition) 9/15/2020   1.  How often do you have a drink containing alcohol? 4 or more times a week   2.  How many drinks containing alcohol do you have on a typical day when you are drinking? 1 or 2   3.  How often do you have five or more drinks on  one occasion? Less than monthly   4.  How often during the last year have you found that you were not able to stop drinking once you had started? Never   5.  How often during the last year have you failed to do what was normally expected of you because of drinking? Never   6.  How often during the last year have you needed a first drink in the morning to get yourself going after a heavy drinking session? Never   7.  How often during the last year have you had a feeling of guilt or remorse after drinking? Never   8.  How often during the last year have you been unable to remember what happened the night before because of your drinking? Never   9.  Have you or someone else been injured because of your drinking? No   10. Has a relative, friend, doctor or other health care worker been concerned about your drinking or suggested you cut down? No   TOTAL SCORE 5       Current providers sharing in care for this patient include:   Patient Care Team:  Nina Adorno MD as PCP - General (Family Practice)  Nina Adorno MD as Assigned PCP    The following health maintenance items are reviewed in Epic and correct as of today:  Health Maintenance   Topic Date Due     ZOSTER IMMUNIZATION (1 of 2) 11/21/2003     AORTIC ANEURYSM SCREENING (SYSTEM ASSIGNED)  11/21/2018     MEDICARE ANNUAL WELLNESS VISIT  11/19/2019     PNEUMOCOCCAL IMMUNIZATION 65+ LOW/MEDIUM RISK (2 of 2 - PPSV23) 11/30/2019     PHQ-9  07/02/2020     FALL RISK ASSESSMENT  07/12/2020     INFLUENZA VACCINE (1) 09/01/2020     COLORECTAL CANCER SCREENING  07/30/2020     ADVANCE CARE PLANNING  11/19/2023     LIPID  01/02/2025     DTAP/TDAP/TD IMMUNIZATION (3 - Td) 04/24/2029     HEPATITIS C SCREENING  Completed     DEPRESSION ACTION PLAN  Completed     IPV IMMUNIZATION  Aged Out     MENINGITIS IMMUNIZATION  Aged Out     HEPATITIS B IMMUNIZATION  Aged Out     BP Readings from Last 3 Encounters:   09/15/20 (!) 142/88   01/13/20  126/82   19 138/78    Wt Readings from Last 3 Encounters:   09/15/20 86.2 kg (190 lb)   20 89.4 kg (197 lb)   20 87.5 kg (193 lb)                  Patient Active Problem List   Diagnosis     Benign essential hypertension     Insomnia, unspecified type     Mild major depression (H)     Erectile dysfunction, unspecified erectile dysfunction type     Hyperlipidemia LDL goal <130     History reviewed. No pertinent surgical history.    Social History     Tobacco Use     Smoking status: Former Smoker     Packs/day: 0.50     Years: 1.00     Pack years: 0.50     Types: Cigarettes, Cigars     Start date: 2000     Last attempt to quit: 2019     Years since quittin.8     Smokeless tobacco: Never Used   Substance Use Topics     Alcohol use: Yes     Alcohol/week: 0.0 standard drinks     Comment: a few beers per day      Family History   Problem Relation Age of Onset     Breast Cancer Mother      Hypertension Mother      Other Cancer Father      Aneurysm Father      Hypertension Father          Current Outpatient Medications   Medication Sig Dispense Refill     atenolol (TENORMIN) 50 MG tablet TAKE 1 AND 1/2 TABLETS EVERY  tablet 0     atorvastatin (LIPITOR) 40 MG tablet TAKE 1 TABLET (40 MG) BY MOUTH DAILY 90 tablet 2     buPROPion (WELLBUTRIN XL) 150 MG 24 hr tablet Take 1 tablet (150 mg) by mouth every morning 90 tablet 1     clotrimazole (LOTRIMIN) 1 % external cream Apply to affected area BID X 1 month supply 2 g 1     finasteride (PROSCAR) 5 MG tablet Take 1 tablet (5 mg) by mouth daily 30 tablet 1     hydrochlorothiazide (HYDRODIURIL) 25 MG tablet TAKE 1 TABLET (25 MG) BY MOUTH DAILY 90 tablet 2     losartan (COZAAR) 100 MG tablet TAKE 1 TABLET EVERY DAY 90 tablet 2     sertraline (ZOLOFT) 50 MG tablet Take 1 tablet (50 mg) by mouth daily 90 tablet 3     traZODone (DESYREL) 50 MG tablet TAKE 1-2 TABLETS ( MG) BY MOUTH NIGHTLY AS NEEDED FOR SLEEP 180 tablet 0     triamcinolone  "(KENALOG) 0.1 % external ointment apply 1 Application twice a day as needed to buttock area 80 g 3     No Known Allergies  Recent Labs   Lab Test 01/13/20  1013 01/02/20  0855 11/19/18  0955 11/17/17  1400   A1C  --   --   --  5.4   LDL  --  74 138* 166*   HDL  --  45 51 65   TRIG  --  289* 164* 159*   ALT 38 53 33 35   CR 1.24 1.51* 1.24 1.29*   GFRESTIMATED 60* 47* 59* 56*   GFRESTBLACK 70 55* 71 68   POTASSIUM 3.6 3.5 4.4 4.2          Review of Systems   Constitutional: Negative for chills and fever.   HENT: Positive for congestion and hearing loss. Negative for ear pain and sore throat.    Eyes: Negative for pain and visual disturbance.   Respiratory: Negative for cough and shortness of breath.    Cardiovascular: Negative for chest pain, palpitations and peripheral edema.   Gastrointestinal: Negative for abdominal pain, constipation, diarrhea, heartburn, hematochezia and nausea.   Genitourinary: Positive for frequency and urgency. Negative for discharge, dysuria, genital sores, hematuria and impotence.   Musculoskeletal: Positive for arthralgias and myalgias. Negative for joint swelling.   Neurological: Negative for dizziness, headaches and paresthesias.   Psychiatric/Behavioral: Negative for mood changes. The patient is nervous/anxious.          OBJECTIVE:   There were no vitals taken for this visit. Estimated body mass index is 29.95 kg/m  as calculated from the following:    Height as of 2/3/20: 1.727 m (5' 8\").    Weight as of 2/3/20: 89.4 kg (197 lb).   BP (!) 142/88   Pulse 67   Temp 97  F (36.1  C) (Tympanic)   Resp 20   Ht 1.727 m (5' 8\")   Wt 86.2 kg (190 lb)   SpO2 98%   BMI 28.89 kg/m      Physical Exam  GENERAL: healthy, alert and no distress  EYES: Eyes grossly normal to inspection, PERRL and conjunctivae and sclerae normal  HENT: ear canals and TM's normal, nose and mouth without ulcers or lesions  NECK: no adenopathy, no asymmetry, masses, or scars and thyroid normal to palpation  RESP: " lungs clear to auscultation - no rales, rhonchi or wheezes  CV: regular rate and rhythm, normal S1 S2, no S3 or S4, no murmur, click or rub, no peripheral edema and peripheral pulses strong  ABDOMEN: soft, nontender, no hepatosplenomegaly, no masses and bowel sounds normal  MS: no gross musculoskeletal defects noted, no edema  SKIN: no suspicious lesions or rashes  NEURO: Normal strength and tone, mentation intact and speech normal  PSYCH: mentation appears normal, affect normal/bright    ASSESSMENT / PLAN:   1. Encounter for Medicare annual wellness exam  2. Screening for prostate cancer    - Prostate spec antigen screen    Labs today.  Continue good diet and exercise.  Flu and PCV 23 at pharmacy today.    3. Benign essential hypertension    - Comprehensive metabolic panel  - atenolol (TENORMIN) 50 MG tablet; Take 1 tablet (50 mg) by mouth daily  Dispense: 90 tablet; Refill: 1    Stable on current regimen.  CMP today.    4. Mild major depression (H)    Stable on Wellbutrin.  Grief with recent loss of older brother 2 weeks ago.    5. Insomnia, unspecified type    - traZODone (DESYREL) 50 MG tablet; Take 1-2 tablets ( mg) by mouth nightly as needed for sleep  Dispense: 180 tablet; Refill: 3    Stable on trazodone- refilled today.    6. Hyperlipidemia LDL goal <130    - Comprehensive metabolic panel  - Lipid panel reflex to direct LDL Fasting    Recheck of lipids today.    7. Benign prostatic hyperplasia with urinary frequency    - UROLOGY ADULT REFERRAL; Future  - finasteride (PROSCAR) 5 MG tablet; Take 1 tablet (5 mg) by mouth daily  Dispense: 30 tablet; Refill: 1    Trial of Proscar- did not want to try increased dose of Flomax.  Referral to Urology prn.    8. Eczema, unspecified type    - triamcinolone (KENALOG) 0.1 % external ointment; apply 1 Application twice a day as needed to buttock area  Dispense: 80 g; Refill: 3    Refill of steroid cream today.    COUNSELING:  Reviewed preventive health counseling,  "as reflected in patient instructions       Regular exercise       Healthy diet/nutrition       Prostate cancer screening    Estimated body mass index is 29.95 kg/m  as calculated from the following:    Height as of 2/3/20: 1.727 m (5' 8\").    Weight as of 2/3/20: 89.4 kg (197 lb).    Weight management plan: Discussed healthy diet and exercise guidelines    He reports that he quit smoking about 10 months ago. His smoking use included cigarettes and cigars. He started smoking about 20 years ago. He has a 0.50 pack-year smoking history. He has never used smokeless tobacco.      Appropriate preventive services were discussed with this patient, including applicable screening as appropriate for cardiovascular disease, diabetes, osteopenia/osteoporosis, and glaucoma.  As appropriate for age/gender, discussed screening for colorectal cancer, prostate cancer, breast cancer, and cervical cancer. Checklist reviewing preventive services available has been given to the patient.    Reviewed patients plan of care and provided an AVS. The Basic Care Plan (routine screening as documented in Health Maintenance) for Ming meets the Care Plan requirement. This Care Plan has been established and reviewed with the Patient.    Counseling Resources:  ATP IV Guidelines  Pooled Cohorts Equation Calculator  Breast Cancer Risk Calculator  Breast Cancer: Medication to Reduce Risk  FRAX Risk Assessment  ICSI Preventive Guidelines  Dietary Guidelines for Americans, 2010  USDA's MyPlate  ASA Prophylaxis  Lung CA Screening    Nina Adorno MD  [facility]    Identified Health Risks:  "

## 2020-09-16 ENCOUNTER — TELEPHONE (OUTPATIENT)
Dept: UROLOGY | Facility: CLINIC | Age: 65
End: 2020-09-16

## 2020-10-20 DIAGNOSIS — I10 BENIGN ESSENTIAL HYPERTENSION: ICD-10-CM

## 2020-10-20 RX ORDER — ATENOLOL 50 MG/1
50 TABLET ORAL DAILY
Qty: 90 TABLET | Refills: 2 | Status: SHIPPED | OUTPATIENT
Start: 2020-10-20 | End: 2021-06-15

## 2020-11-01 NOTE — TELEPHONE ENCOUNTER
MEDICAL RECORDS REQUEST   [facility]  [address] [place] [address]  PHONE: [phone]  Fax: [phone]        FUTURE VISIT INFORMATION                                                   Ming Garnett, : 1955 scheduled for future visit at [facility]    APPOINTMENT INFORMATION:    Date: 20 8:30AM    Provider:  Amira Rosado PA-C    Reason for Visit/Diagnosis:  Frequent urination     REFERRAL INFORMATION:    Referring provider:  GIANFRANCO GIVENS    Specialty: MD    Referring providers clinic:      Clinic contact number:  N/A    RECORDS REQUESTED FOR VISIT                                                     NOTES  STATUS/DETAILS   OFFICE NOTE from referring provider  yes   OFFICE NOTE from other specialist  no   DISCHARGE SUMMARY from hospital  no   DISCHARGE REPORT from the ER  no   OPERATIVE REPORT  no   MEDICATION LIST  no   LABS     URINALYSIS (UA) / PSA  yes     PRE-VISIT CHECKLIST      Record collection complete Yes- internal recs in epic   Appointment appropriately scheduled           (right time/right provider) Yes   MyChart activation Yes   Questionnaire complete If no, please explain: In process      Completed by: Yisel Estrella

## 2020-11-05 ENCOUNTER — PRE VISIT (OUTPATIENT)
Dept: UROLOGY | Facility: CLINIC | Age: 65
End: 2020-11-05

## 2020-11-05 NOTE — TELEPHONE ENCOUNTER
Visit Type : Frequent urination    Records/Orders: in epic     Pt Contacted: no    At Rooming: normal

## 2020-11-17 RX ORDER — TAMSULOSIN HYDROCHLORIDE 0.4 MG/1
CAPSULE ORAL
Qty: 90 CAPSULE | OUTPATIENT
Start: 2020-11-17

## 2020-11-18 ENCOUNTER — TELEPHONE (OUTPATIENT)
Dept: UROLOGY | Facility: CLINIC | Age: 65
End: 2020-11-18

## 2020-11-23 ENCOUNTER — PRE VISIT (OUTPATIENT)
Dept: UROLOGY | Facility: CLINIC | Age: 65
End: 2020-11-23

## 2020-11-23 NOTE — TELEPHONE ENCOUNTER
Visit Type : Frequent urination     Records/Orders: in epic      Pt Contacted: no     At Rooming: video

## 2020-11-24 ENCOUNTER — PRE VISIT (OUTPATIENT)
Dept: UROLOGY | Facility: CLINIC | Age: 65
End: 2020-11-24

## 2020-12-27 ENCOUNTER — HEALTH MAINTENANCE LETTER (OUTPATIENT)
Age: 65
End: 2020-12-27

## 2021-03-06 ENCOUNTER — IMMUNIZATION (OUTPATIENT)
Dept: NURSING | Facility: CLINIC | Age: 66
End: 2021-03-06
Payer: COMMERCIAL

## 2021-03-06 PROCEDURE — 91303 PR COVID VAC JANSSEN AD26 0.5ML: CPT

## 2021-03-06 PROCEDURE — 0031A PR COVID VAC JANSSEN AD26 0.5ML: CPT

## 2021-05-19 ENCOUNTER — TELEPHONE (OUTPATIENT)
Dept: FAMILY MEDICINE | Facility: CLINIC | Age: 66
End: 2021-05-19

## 2021-05-20 ENCOUNTER — NURSE TRIAGE (OUTPATIENT)
Dept: FAMILY MEDICINE | Facility: CLINIC | Age: 66
End: 2021-05-20

## 2021-05-20 DIAGNOSIS — G47.00 INSOMNIA, UNSPECIFIED TYPE: ICD-10-CM

## 2021-05-20 RX ORDER — TRAZODONE HYDROCHLORIDE 50 MG/1
50-100 TABLET, FILM COATED ORAL
Qty: 180 TABLET | Refills: 0 | Status: SHIPPED | OUTPATIENT
Start: 2021-05-20 | End: 2021-06-15

## 2021-05-20 NOTE — TELEPHONE ENCOUNTER
Refilled Trazodone for patient for 3 months. Patient switched insurance.  Patient has appt. On 6/15    Thanks! Rupinder Jackman RN

## 2021-05-26 NOTE — TELEPHONE ENCOUNTER
Team Coordinators-Please inform patient:  1. Trazodone was ordered on 5/20/21 and the refill was sent to:  [id number] - [place], [place] - [address]  2. If patient wishes to change pharmacies the new pharmacy will marisa to contact [facility] to initiate the medication transfer    Thank you!  KATHY ProctorN, RN  [facility]

## 2021-06-15 ENCOUNTER — OFFICE VISIT (OUTPATIENT)
Dept: FAMILY MEDICINE | Facility: CLINIC | Age: 66
End: 2021-06-15
Payer: COMMERCIAL

## 2021-06-15 VITALS
DIASTOLIC BLOOD PRESSURE: 78 MMHG | OXYGEN SATURATION: 96 % | TEMPERATURE: 98.3 F | WEIGHT: 201 LBS | RESPIRATION RATE: 16 BRPM | SYSTOLIC BLOOD PRESSURE: 130 MMHG | BODY MASS INDEX: 30.56 KG/M2 | HEART RATE: 81 BPM

## 2021-06-15 DIAGNOSIS — L30.9 ECZEMA, UNSPECIFIED TYPE: ICD-10-CM

## 2021-06-15 DIAGNOSIS — G47.00 INSOMNIA, UNSPECIFIED TYPE: ICD-10-CM

## 2021-06-15 DIAGNOSIS — I10 BENIGN ESSENTIAL HYPERTENSION: ICD-10-CM

## 2021-06-15 DIAGNOSIS — Z12.2 ENCOUNTER FOR SCREENING FOR LUNG CANCER: ICD-10-CM

## 2021-06-15 DIAGNOSIS — R35.0 BENIGN PROSTATIC HYPERPLASIA WITH URINARY FREQUENCY: Primary | ICD-10-CM

## 2021-06-15 DIAGNOSIS — N40.1 BENIGN PROSTATIC HYPERPLASIA WITH URINARY FREQUENCY: Primary | ICD-10-CM

## 2021-06-15 DIAGNOSIS — F41.1 GAD (GENERALIZED ANXIETY DISORDER): ICD-10-CM

## 2021-06-15 DIAGNOSIS — Z87.891 PERSONAL HISTORY OF NICOTINE DEPENDENCE: ICD-10-CM

## 2021-06-15 PROCEDURE — 99214 OFFICE O/P EST MOD 30 MIN: CPT | Performed by: FAMILY MEDICINE

## 2021-06-15 RX ORDER — AMLODIPINE BESYLATE 10 MG/1
TABLET ORAL
Qty: 90 TABLET | Refills: 3 | Status: SHIPPED | OUTPATIENT
Start: 2021-06-15 | End: 2022-02-02

## 2021-06-15 RX ORDER — TRAZODONE HYDROCHLORIDE 50 MG/1
50-100 TABLET, FILM COATED ORAL
Qty: 180 TABLET | Refills: 3 | Status: SHIPPED | OUTPATIENT
Start: 2021-06-15 | End: 2022-02-02 | Stop reason: SINTOL

## 2021-06-15 RX ORDER — HYDROCHLOROTHIAZIDE 25 MG/1
TABLET ORAL
Qty: 90 TABLET | Refills: 3 | Status: SHIPPED | OUTPATIENT
Start: 2021-06-15 | End: 2022-02-02

## 2021-06-15 RX ORDER — ATENOLOL 50 MG/1
50 TABLET ORAL DAILY
Qty: 90 TABLET | Refills: 3 | Status: SHIPPED | OUTPATIENT
Start: 2021-06-15 | End: 2022-02-02

## 2021-06-15 RX ORDER — SERTRALINE HYDROCHLORIDE 100 MG/1
100 TABLET, FILM COATED ORAL DAILY
Qty: 90 TABLET | Refills: 3 | Status: SHIPPED | OUTPATIENT
Start: 2021-06-15 | End: 2022-02-02

## 2021-06-15 RX ORDER — TRIAMCINOLONE ACETONIDE 1 MG/G
OINTMENT TOPICAL
Qty: 80 G | Refills: 3 | Status: SHIPPED | OUTPATIENT
Start: 2021-06-15

## 2021-06-15 RX ORDER — LOSARTAN POTASSIUM 100 MG/1
TABLET ORAL
Qty: 90 TABLET | Refills: 3 | Status: SHIPPED | OUTPATIENT
Start: 2021-06-15 | End: 2022-02-02

## 2021-06-15 NOTE — PROGRESS NOTES
Assessment & Plan     Benign prostatic hyperplasia with urinary frequency    - Adult Urology Referral; Future    Referred back to Urology for possible surgical consult.    JOHANA (generalized anxiety disorder)    - sertraline (ZOLOFT) 100 MG tablet; Take 1 tablet (100 mg) by mouth daily    Will increase to 100 mg.  Follow-up if no change or worsening sx for further titration prn.    Insomnia, unspecified type    - traZODone (DESYREL) 50 MG tablet; Take 1-2 tablets ( mg) by mouth nightly as needed for sleep    Stable on trazodone.    Benign essential hypertension    - amLODIPine (NORVASC) 10 MG tablet; TAKE 1 TABLET EVERY DAY  - atenolol (TENORMIN) 50 MG tablet; Take 1 tablet (50 mg) by mouth daily  - hydrochlorothiazide (HYDRODIURIL) 25 MG tablet; TAKE 1 TABLET EVERY DAY  - losartan (COZAAR) 100 MG tablet; TAKE 1 TABLET EVERY DAY    Stable on current regimen.  Due for CPE/labs 9-2021    Eczema, unspecified type    - triamcinolone (KENALOG) 0.1 % external ointment; APPLY 1 APPLICATION TWICE A DAY AS NEEDED TO BUTTOCK AREA    Kenalog refilled at patient request.    Encounter for screening for lung cancer  Personal history of nicotine dependence     - CT Chest Lung Cancer Scrn Low Dose wo; Future    Will check CT lungs for baseline.  Complete tobacco cessation recommended.      30 minutes spent on the date of the encounter doing chart review, patient visit and documentation     No follow-ups on file.    Nina Adorno MD  [facility]    Bishop Lai is a 65 year old who presents for the following health issues     HPI     Pt in to follow-up on prostate and discuss medication. Both of them did not help with his bladder    Current Chronic Medical Conditions  HTN  Hyperlipidemia  insomnia  Major depression/JOHANA  Tobacco abuse- using Wellbutrin to remain smoke-free  BPH  OA of B knees- holding off on replacements during COVID     Social History  .  Lives at home with  wife and daughter and grandkids.  Enjoys woodworking as hobby in [employer].      Additional Concerns  Never got to see Urology- did not want to do virtual appt-- would like new referral- Flomax and Proscar did nothing to help.  JOHANA slightly worsening- would like to consider dose increase.  Sleep stable on trazodone.  Blood pressure stable.  Notes intermittent cough and pain in LEFt shoulder blade which comes and goes-- continues to smoke- rolls his own cigs 15/day.  Has smoked for 40+ years-- quit after 25 years then has restarted in last 15 years.  No chest pain. Remains active outdoors around house.    Review of Systems   Constitutional, HEENT, cardiovascular, pulmonary, GI, , musculoskeletal, neuro, skin, endocrine and psych systems are negative, except as otherwise noted.      Objective    /78 (BP Location: Right arm, Patient Position: Sitting, Cuff Size: Adult Regular)   Pulse 81   Temp 98.3  F (36.8  C) (Tympanic)   Resp 16   Wt 91.2 kg (201 lb)   SpO2 96%   BMI 30.56 kg/m    Body mass index is 30.56 kg/m .  Physical Exam   GENERAL: healthy, alert and no distress  EYES: Eyes grossly normal to inspection, PERRL and conjunctivae and sclerae normal  NECK: no adenopathy, no asymmetry, masses, or scars and thyroid normal to palpation  MS: no gross musculoskeletal defects noted, no edema  SKIN: no suspicious lesions or rashes  NEURO: Normal strength and tone, mentation intact and speech normal  PSYCH: mentation appears normal, affect normal/bright

## 2021-06-18 ENCOUNTER — PRE VISIT (OUTPATIENT)
Dept: UROLOGY | Facility: CLINIC | Age: 66
End: 2021-06-18

## 2021-06-21 ENCOUNTER — ANCILLARY PROCEDURE (OUTPATIENT)
Dept: CT IMAGING | Facility: CLINIC | Age: 66
End: 2021-06-21
Attending: FAMILY MEDICINE
Payer: COMMERCIAL

## 2021-06-21 DIAGNOSIS — Z87.891 PERSONAL HISTORY OF NICOTINE DEPENDENCE: ICD-10-CM

## 2021-06-21 DIAGNOSIS — Z12.2 ENCOUNTER FOR SCREENING FOR LUNG CANCER: ICD-10-CM

## 2021-06-21 PROCEDURE — 71271 CT THORAX LUNG CANCER SCR C-: CPT | Performed by: RADIOLOGY

## 2021-08-17 ENCOUNTER — PRE VISIT (OUTPATIENT)
Dept: UROLOGY | Facility: CLINIC | Age: 66
End: 2021-08-17

## 2021-08-23 NOTE — PROGRESS NOTES
Chief Complaint:   BPH         History of Present Illness:    Ming Garnett is a 65 year old male with a history of BPH with LUTS who presents for consult. Symptoms including frequency, hesitancy, dribbling stream, double-voiding and nocturia every 1-2 hours, which have been present for the past year. He has taken both Flomax and finasteride for this, but felt that neither of these medications made an impact on his symptoms. He therefore quit taking them a few months ago.     PSA last checked one year ago and was 2.12. Initially denies any family history of prostate issues, but then acknowledges that his grandfather had prostate cancer.          Past Medical History:     Past Medical History:   Diagnosis Date     Hearing problem      Hypertension      Sensorineural hearing loss      Sleep apnea             Past Surgical History:   No past surgical history on file.         Medications     Current Outpatient Medications   Medication     amLODIPine (NORVASC) 10 MG tablet     atenolol (TENORMIN) 50 MG tablet     atorvastatin (LIPITOR) 40 MG tablet     finasteride (PROSCAR) 5 MG tablet     hydrochlorothiazide (HYDRODIURIL) 25 MG tablet     losartan (COZAAR) 100 MG tablet     sertraline (ZOLOFT) 100 MG tablet     traZODone (DESYREL) 50 MG tablet     triamcinolone (KENALOG) 0.1 % external ointment     buPROPion (WELLBUTRIN XL) 150 MG 24 hr tablet     clotrimazole (LOTRIMIN) 1 % external cream     sertraline (ZOLOFT) 50 MG tablet     No current facility-administered medications for this visit.            Family History:     Family History   Problem Relation Age of Onset     Breast Cancer Mother      Hypertension Mother      Other Cancer Father      Aneurysm Father      Hypertension Father             Social History:     Social History     Socioeconomic History     Marital status:      Spouse name: Not on file     Number of children: Not on file     Years of education: Not on file     Highest education  "level: Not on file   Occupational History     Not on file   Tobacco Use     Smoking status: Current Every Day Smoker     Packs/day: 0.50     Years: 1.00     Pack years: 0.50     Types: Cigarettes, Cigars     Start date: 2000     Last attempt to quit: 2019     Years since quittin.7     Smokeless tobacco: Never Used   Substance and Sexual Activity     Alcohol use: Yes     Alcohol/week: 0.0 standard drinks     Comment: a few beers per day      Drug use: Never     Sexual activity: Yes     Partners: Female   Other Topics Concern     Parent/sibling w/ CABG, MI or angioplasty before 65F 55M? No   Social History Narrative     Not on file     Social Determinants of Health     Financial Resource Strain:      Difficulty of Paying Living Expenses:    Food Insecurity:      Worried About Running Out of Food in the Last Year:      Ran Out of Food in the Last Year:    Transportation Needs:      Lack of Transportation (Medical):      Lack of Transportation (Non-Medical):    Physical Activity:      Days of Exercise per Week:      Minutes of Exercise per Session:    Stress:      Feeling of Stress :    Social Connections:      Frequency of Communication with Friends and Family:      Frequency of Social Gatherings with Friends and Family:      Attends Advent Services:      Active Member of Clubs or Organizations:      Attends Club or Organization Meetings:      Marital Status:    Intimate Partner Violence:      Fear of Current or Ex-Partner:      Emotionally Abused:      Physically Abused:      Sexually Abused:             Allergies:   Patient has no known allergies.         Review of Systems:  From intake questionnaire   Negative 14 system review except as noted on HPI, nurse's note.         Physical Exam:   Patient is a 65 year old  male   Vitals: Blood pressure (!) 145/90, pulse 71, height 1.727 m (5' 8\"), weight 88.5 kg (195 lb).  General Appearance Adult: Alert, no acute distress, oriented  Lungs: no respiratory " distress, or pursed lip breathing  Heart: No obvious jugular venous distension present  Abdomen: soft, nontender, no organomegaly or masses, Body mass index is 29.65 kg/m .  : Right prostatic lobe more pronounced; asymmetric     Uroflow and Post-Void Residual by Bladder Scan   Intermittent flow- not enough to characterize.   PVR: 15 mL      Labs and Pathology:    I personally reviewed all applicable laboratory data and went over findings with patient  Significant for:    BMP RESULTS:  Recent Labs   Lab Test 09/15/20  0756 01/13/20  1013 01/02/20  0855 11/19/18  0955    138 136 138   POTASSIUM 4.1 3.6 3.5 4.4   CHLORIDE 106 104 104 102   CO2 25 30 24 26   ANIONGAP 5 4 8 10   GLC 88 86 105* 87   BUN 19 22 29 18   CR 1.25 1.24 1.51* 1.24   GFRESTIMATED 59* 60* 47* 59*   GFRESTBLACK 69 70 55* 71   EMMANUEL 9.8 9.3 9.5 10.3*       UA RESULTS:   Recent Labs   Lab Test 01/13/20  0944   SG 1.025   URINEPH 6.5   NITRITE Negative   RBCU O - 2   WBCU 0 - 5       PSA RESULTS  PSA   Date Value Ref Range Status   09/15/2020 2.12 0 - 4 ug/L Final     Comment:     Assay Method:  Chemiluminescence using Siemens Vista analyzer   11/19/2018 1.75 0 - 4 ug/L Final     Comment:     Assay Method:  Chemiluminescence using Siemens Vista analyzer   11/17/2017 2.31 0 - 4 ug/L Final     Comment:     Assay Method:  Chemiluminescence using Siemens Vista analyzer            Assessment and Plan:     Assessment: 65 year old male with a history of LUTS, not improved by Flomax or finasteride. Mildly enlarged prostate on CICI with asymmetry- right lobe more prominent. PVR low at 15 mL, but unable to interpret flow due to insufficient volume. Due to the predominant irritative quality of his symptoms, recommend an anticholinergic trial to see how he response to this. He agrees with this plan.     Regarding the prostatic asymmetry noted, I recommend we obtain a prostate MRI to evaluate this further.     Plan:  -Start oxybutynin XL 10 mg daily. Side  effects discussed.   -Update PSA today.  -Obtain prostate MRI to evaluate asymmetry.   -Follow up with me via virtual visit to discuss symptoms and test results in one month.    Estrella Brewer, CNP  Department of Urology

## 2021-08-24 ENCOUNTER — OFFICE VISIT (OUTPATIENT)
Dept: UROLOGY | Facility: CLINIC | Age: 66
End: 2021-08-24
Attending: FAMILY MEDICINE
Payer: COMMERCIAL

## 2021-08-24 VITALS
HEIGHT: 68 IN | DIASTOLIC BLOOD PRESSURE: 90 MMHG | SYSTOLIC BLOOD PRESSURE: 145 MMHG | HEART RATE: 71 BPM | WEIGHT: 195 LBS | BODY MASS INDEX: 29.55 KG/M2

## 2021-08-24 DIAGNOSIS — N40.1 BENIGN PROSTATIC HYPERPLASIA WITH URINARY HESITANCY: Primary | ICD-10-CM

## 2021-08-24 DIAGNOSIS — N42.89 ASYMMETRIC PROSTATE: ICD-10-CM

## 2021-08-24 DIAGNOSIS — R39.11 BENIGN PROSTATIC HYPERPLASIA WITH URINARY HESITANCY: Primary | ICD-10-CM

## 2021-08-24 DIAGNOSIS — R35.0 URINARY FREQUENCY: ICD-10-CM

## 2021-08-24 PROCEDURE — 99203 OFFICE O/P NEW LOW 30 MIN: CPT | Mod: 25 | Performed by: NURSE PRACTITIONER

## 2021-08-24 PROCEDURE — 51798 US URINE CAPACITY MEASURE: CPT | Performed by: NURSE PRACTITIONER

## 2021-08-24 RX ORDER — OXYBUTYNIN CHLORIDE 10 MG/1
10 TABLET, EXTENDED RELEASE ORAL DAILY
Qty: 30 TABLET | Refills: 3 | Status: SHIPPED | OUTPATIENT
Start: 2021-08-24 | End: 2021-09-28

## 2021-08-24 ASSESSMENT — PAIN SCALES - GENERAL: PAINLEVEL: NO PAIN (0)

## 2021-08-24 ASSESSMENT — MIFFLIN-ST. JEOR: SCORE: 1634.01

## 2021-08-24 ASSESSMENT — PATIENT HEALTH QUESTIONNAIRE - PHQ9: SUM OF ALL RESPONSES TO PHQ QUESTIONS 1-9: 0

## 2021-08-24 NOTE — LETTER
8/24/2021       RE: Ming Garnett  [address] [place] [address]     Dear Colleague,    Thank you for referring your patient, Ming Garnett, to the [facility] UROLOGY CLINIC [place] at [facility]. Please see a copy of my visit note below.            Chief Complaint:   BPH         History of Present Illness:    Ming Garnett is a 65 year old male with a history of BPH with LUTS who presents for consult. Symptoms including frequency, hesitancy, dribbling stream, double-voiding and nocturia every 1-2 hours, which have been present for the past year. He has taken both Flomax and finasteride for this, but felt that neither of these medications made an impact on his symptoms. He therefore quit taking them a few months ago.     PSA last checked one year ago and was 2.12. Initially denies any family history of prostate issues, but then acknowledges that his grandfather had prostate cancer.          Past Medical History:     Past Medical History:   Diagnosis Date     Hearing problem      Hypertension      Sensorineural hearing loss      Sleep apnea             Past Surgical History:   No past surgical history on file.         Medications     Current Outpatient Medications   Medication     amLODIPine (NORVASC) 10 MG tablet     atenolol (TENORMIN) 50 MG tablet     atorvastatin (LIPITOR) 40 MG tablet     finasteride (PROSCAR) 5 MG tablet     hydrochlorothiazide (HYDRODIURIL) 25 MG tablet     losartan (COZAAR) 100 MG tablet     sertraline (ZOLOFT) 100 MG tablet     traZODone (DESYREL) 50 MG tablet     triamcinolone (KENALOG) 0.1 % external ointment     buPROPion (WELLBUTRIN XL) 150 MG 24 hr tablet     clotrimazole (LOTRIMIN) 1 % external cream     sertraline (ZOLOFT) 50 MG tablet     No current facility-administered medications for this visit.            Family History:     Family History   Problem Relation Age of Onset     Breast Cancer Mother       Hypertension Mother      Other Cancer Father      Aneurysm Father      Hypertension Father             Social History:     Social History     Socioeconomic History     Marital status:      Spouse name: Not on file     Number of children: Not on file     Years of education: Not on file     Highest education level: Not on file   Occupational History     Not on file   Tobacco Use     Smoking status: Current Every Day Smoker     Packs/day: 0.50     Years: 1.00     Pack years: 0.50     Types: Cigarettes, Cigars     Start date: 2000     Last attempt to quit: 2019     Years since quittin.7     Smokeless tobacco: Never Used   Substance and Sexual Activity     Alcohol use: Yes     Alcohol/week: 0.0 standard drinks     Comment: a few beers per day      Drug use: Never     Sexual activity: Yes     Partners: Female   Other Topics Concern     Parent/sibling w/ CABG, MI or angioplasty before 65F 55M? No   Social History Narrative     Not on file     Social Determinants of Health     Financial Resource Strain:      Difficulty of Paying Living Expenses:    Food Insecurity:      Worried About Running Out of Food in the Last Year:      Ran Out of Food in the Last Year:    Transportation Needs:      Lack of Transportation (Medical):      Lack of Transportation (Non-Medical):    Physical Activity:      Days of Exercise per Week:      Minutes of Exercise per Session:    Stress:      Feeling of Stress :    Social Connections:      Frequency of Communication with Friends and Family:      Frequency of Social Gatherings with Friends and Family:      Attends Spiritism Services:      Active Member of Clubs or Organizations:      Attends Club or Organization Meetings:      Marital Status:    Intimate Partner Violence:      Fear of Current or Ex-Partner:      Emotionally Abused:      Physically Abused:      Sexually Abused:             Allergies:   Patient has no known allergies.         Review of Systems:  From intake  "questionnaire   Negative 14 system review except as noted on HPI, nurse's note.         Physical Exam:   Patient is a 65 year old  male   Vitals: Blood pressure (!) 145/90, pulse 71, height 1.727 m (5' 8\"), weight 88.5 kg (195 lb).  General Appearance Adult: Alert, no acute distress, oriented  Lungs: no respiratory distress, or pursed lip breathing  Heart: No obvious jugular venous distension present  Abdomen: soft, nontender, no organomegaly or masses, Body mass index is 29.65 kg/m .  : Right prostatic lobe more pronounced; asymmetric     Uroflow and Post-Void Residual by Bladder Scan   Intermittent flow- not enough to characterize.   PVR: 15 mL      Labs and Pathology:    I personally reviewed all applicable laboratory data and went over findings with patient  Significant for:    BMP RESULTS:  Recent Labs   Lab Test 09/15/20  0756 01/13/20  1013 01/02/20  0855 11/19/18  0955    138 136 138   POTASSIUM 4.1 3.6 3.5 4.4   CHLORIDE 106 104 104 102   CO2 25 30 24 26   ANIONGAP 5 4 8 10   GLC 88 86 105* 87   BUN 19 22 29 18   CR 1.25 1.24 1.51* 1.24   GFRESTIMATED 59* 60* 47* 59*   GFRESTBLACK 69 70 55* 71   EMMANUEL 9.8 9.3 9.5 10.3*       UA RESULTS:   Recent Labs   Lab Test 01/13/20  0944   SG 1.025   URINEPH 6.5   NITRITE Negative   RBCU O - 2   WBCU 0 - 5       PSA RESULTS  PSA   Date Value Ref Range Status   09/15/2020 2.12 0 - 4 ug/L Final     Comment:     Assay Method:  Chemiluminescence using Siemens Vista analyzer   11/19/2018 1.75 0 - 4 ug/L Final     Comment:     Assay Method:  Chemiluminescence using Siemens Vista analyzer   11/17/2017 2.31 0 - 4 ug/L Final     Comment:     Assay Method:  Chemiluminescence using Siemens Vista analyzer            Assessment and Plan:     Assessment: 65 year old male with a history of LUTS, not improved by Flomax or finasteride. Mildly enlarged prostate on CICI with asymmetry- right lobe more prominent. PVR low at 15 mL, but unable to interpret flow due to insufficient " volume. Due to the predominant irritative quality of his symptoms, recommend an anticholinergic trial to see how he response to this. He agrees with this plan.     Regarding the prostatic asymmetry noted, I recommend we obtain a prostate MRI to evaluate this further.     Plan:  -Start oxybutynin XL 10 mg daily. Side effects discussed.   -Update PSA today.  -Obtain prostate MRI to evaluate asymmetry.   -Follow up with me via virtual visit to discuss symptoms and test results in one month.    Estrella Brewer, CNP  Department of Urology

## 2021-08-24 NOTE — PATIENT INSTRUCTIONS
UROLOGY CLINIC VISIT PATIENT INSTRUCTIONS    -Start the oxybutynin XL 10 mg daily  -We will also obtain an MRI of your prostate to evaluate the asymmetry I noted on exam.  -Lastly, we have you go to the lab for a PSA blood draw.  -Follow up with me in one month via virtual visit to review symptoms and discuss the above results.     If you have any issues, questions or concerns in the meantime, do not hesitate to contact us at [phone] or via Possible Web.     It was a pleasure meeting with you today.  Thank you for allowing me and my team the privilege of caring for you today.  YOU are the reason we are here, and I truly hope we provided you with the excellent service you deserve.  Please let us know if there is anything else we can do for you so that we can be sure you are leaving completely satisfied with your care experience.    Estrella Brewer, CNP

## 2021-08-25 ENCOUNTER — LAB (OUTPATIENT)
Dept: LAB | Facility: CLINIC | Age: 66
End: 2021-08-25
Payer: COMMERCIAL

## 2021-08-25 DIAGNOSIS — N42.89 ASYMMETRIC PROSTATE: ICD-10-CM

## 2021-08-25 DIAGNOSIS — R35.0 URINARY FREQUENCY: ICD-10-CM

## 2021-08-25 LAB — PSA SERPL-MCNC: 1.71 UG/L (ref 0–4)

## 2021-08-25 PROCEDURE — 84153 ASSAY OF PSA TOTAL: CPT | Performed by: PATHOLOGY

## 2021-08-25 PROCEDURE — 36415 COLL VENOUS BLD VENIPUNCTURE: CPT | Performed by: PATHOLOGY

## 2021-09-05 ENCOUNTER — ANCILLARY PROCEDURE (OUTPATIENT)
Dept: MRI IMAGING | Facility: CLINIC | Age: 66
End: 2021-09-05
Attending: NURSE PRACTITIONER
Payer: COMMERCIAL

## 2021-09-05 DIAGNOSIS — N42.89 ASYMMETRIC PROSTATE: ICD-10-CM

## 2021-09-05 PROCEDURE — 72197 MRI PELVIS W/O & W/DYE: CPT | Performed by: RADIOLOGY

## 2021-09-05 PROCEDURE — A9585 GADOBUTROL INJECTION: HCPCS | Performed by: RADIOLOGY

## 2021-09-05 RX ORDER — GADOBUTROL 604.72 MG/ML
10 INJECTION INTRAVENOUS ONCE
Status: COMPLETED | OUTPATIENT
Start: 2021-09-05 | End: 2021-09-05

## 2021-09-05 RX ADMIN — GADOBUTROL 9 ML: 604.72 INJECTION INTRAVENOUS at 09:28

## 2021-09-21 ENCOUNTER — PRE VISIT (OUTPATIENT)
Dept: UROLOGY | Facility: CLINIC | Age: 66
End: 2021-09-21

## 2021-09-21 NOTE — TELEPHONE ENCOUNTER
Reason for visit: symptom check, test results      Dx/Hx/Sx: BPH, frequency, hesitancy, dribbling     Records/imaging/labs/orders: PSA and prostate MRI in epic      At Rooming: video visit

## 2021-09-28 ENCOUNTER — VIRTUAL VISIT (OUTPATIENT)
Dept: UROLOGY | Facility: CLINIC | Age: 66
End: 2021-09-28
Payer: COMMERCIAL

## 2021-09-28 DIAGNOSIS — N40.1 BENIGN PROSTATIC HYPERPLASIA WITH URINARY FREQUENCY: Primary | ICD-10-CM

## 2021-09-28 DIAGNOSIS — R35.0 BENIGN PROSTATIC HYPERPLASIA WITH URINARY FREQUENCY: Primary | ICD-10-CM

## 2021-09-28 PROCEDURE — 99213 OFFICE O/P EST LOW 20 MIN: CPT | Mod: TEL | Performed by: NURSE PRACTITIONER

## 2021-09-28 ASSESSMENT — PAIN SCALES - GENERAL: PAINLEVEL: NO PAIN (0)

## 2021-09-28 NOTE — LETTER
9/28/2021       RE: Ming Garnett  [address] [place] [address]     Dear Colleague,    Thank you for referring your patient, Ming Garnett, to the [facility] UROLOGY CLINIC [place] at [facility]. Please see a copy of my visit note below.    Ming Garnett is a 65 year old who is being evaluated via a billable video visit.      How would you like to obtain your AVS? MyChart  If the video visit is dropped, the invitation should be resent by: Text to cell phone: [phone]    Duration of telephone call: 7 minutes      Ming Garnett complains of   Chief Complaint   Patient presents with     RECHECK     Symptom check and review results       Assessment/Plan:  65 year old male with a history of BPH with LUTS (frequency, hesitancy, dribbling stream, double-voiding and nocturia every 1-2 hour), with symptoms refractory to Flomax, finasteride, and oxybutynin. We discussed potential next steps today, including cystoscopy vs urodynamics. He is happy to go either direction at this time.  -Schedule next available cystoscopy with Dr. Zaldivar or Dr. Nicole.    Estrella Brewer, CNP  Department of Urology      Subjective:   65 year old male with a history of BPH with LUTS (frequency, hesitancy, dribbling stream, double-voiding and nocturia every 1-2 hour) who presents for follow up. I initially saw him for consult one month ago. Had taken both Flomax and finasteride for this, but felt that neither med impacted his symptoms. CICI completed at our visit, and an asymmetric prostate was appreciated. I therefore suggest we pursue an MRI to evaluate this further, which was completed on 9/5 and was fortunately graded as PIRADS 2, 33 gm prostate. Regarding his symptoms and lack of response to prostate medications, he was started on a trial of oxybutynin XL 10 mg daily.     Today, he states that his symptoms have been unchanged since starting the oxybutynin.        Objective:     Exam:   Patient is a 65 year old male   General Appearance: Well groomed, hygenic  Respiratory: No cough, no respiratory distress or labored breathing  Musculoskeletal: Grossly normal with no gross deficits  Skin: No discoloration or apparent rashes  Neurologic: No tremors  Psychiatric: Alert and oriented  Further examination is deferred due to the nature of our visit.

## 2021-09-28 NOTE — NURSING NOTE
Chief Complaint   Patient presents with     RECHECK     Symptom check and review results       There were no vitals taken for this visit. There is no height or weight on file to calculate BMI.    Patient Active Problem List   Diagnosis     Benign essential hypertension     Insomnia, unspecified type     Mild major depression (H)     Erectile dysfunction, unspecified erectile dysfunction type     Hyperlipidemia LDL goal <130       No Known Allergies    Current Outpatient Medications   Medication Sig Dispense Refill     amLODIPine (NORVASC) 10 MG tablet TAKE 1 TABLET EVERY DAY 90 tablet 3     atenolol (TENORMIN) 50 MG tablet Take 1 tablet (50 mg) by mouth daily 90 tablet 3     atorvastatin (LIPITOR) 40 MG tablet TAKE 1 TABLET EVERY DAY 90 tablet 2     clotrimazole (LOTRIMIN) 1 % external cream Apply to affected area BID X 1 month supply 2 g 1     hydrochlorothiazide (HYDRODIURIL) 25 MG tablet TAKE 1 TABLET EVERY DAY 90 tablet 3     losartan (COZAAR) 100 MG tablet TAKE 1 TABLET EVERY DAY 90 tablet 3     sertraline (ZOLOFT) 100 MG tablet Take 1 tablet (100 mg) by mouth daily 90 tablet 3     triamcinolone (KENALOG) 0.1 % external ointment APPLY 1 APPLICATION TWICE A DAY AS NEEDED TO BUTTOCK AREA 80 g 3     buPROPion (WELLBUTRIN XL) 150 MG 24 hr tablet Take 1 tablet (150 mg) by mouth every morning (Patient not taking: Reported on 8/24/2021) 90 tablet 1     finasteride (PROSCAR) 5 MG tablet TAKE 1 TABLET EVERY DAY (Patient not taking: Reported on 9/28/2021) 180 tablet 2     oxybutynin ER (DITROPAN-XL) 10 MG 24 hr tablet Take 1 tablet (10 mg) by mouth daily (Patient not taking: Reported on 9/28/2021) 30 tablet 3     sertraline (ZOLOFT) 50 MG tablet TAKE 1 TABLET EVERY DAY (Patient not taking: Reported on 8/24/2021) 90 tablet 3     traZODone (DESYREL) 50 MG tablet Take 1-2 tablets ( mg) by mouth nightly as needed for sleep (Patient not taking: Reported on 9/28/2021) 180 tablet 3       Social History     Tobacco Use      Smoking status: Current Every Day Smoker     Packs/day: 0.50     Years: 1.00     Pack years: 0.50     Types: Cigarettes, Cigars     Start date: 2000     Last attempt to quit: 2019     Years since quittin.8     Smokeless tobacco: Never Used   Substance Use Topics     Alcohol use: Yes     Alcohol/week: 0.0 standard drinks     Comment: a few beers per day      Drug use: Never       Keke Oh CMA  2021  1:15 PM

## 2021-09-28 NOTE — PATIENT INSTRUCTIONS
UROLOGY CLINIC VISIT PATIENT INSTRUCTIONS    -Schedule cystoscopy with the next available between Dr. Zaldivar and Dr. Nicole.     If you have any issues, questions or concerns in the meantime, do not hesitate to contact us at [phone] or via Remote.     It was a pleasure meeting with you today.  Thank you for allowing me and my team the privilege of caring for you today.  YOU are the reason we are here, and I truly hope we provided you with the excellent service you deserve.  Please let us know if there is anything else we can do for you so that we can be sure you are leaving completely satisfied with your care experience.    Estrella Brewer, CNP

## 2021-09-28 NOTE — PROGRESS NOTES
Ming Garnett is a 65 year old who is being evaluated via a billable video visit.      How would you like to obtain your AVS? MyChart  If the video visit is dropped, the invitation should be resent by: Text to cell phone: [phone]    Duration of telephone call: 7 minutes      Ming Garnett complains of   Chief Complaint   Patient presents with     RECHECK     Symptom check and review results       Assessment/Plan:  65 year old male with a history of BPH with LUTS (frequency, hesitancy, dribbling stream, double-voiding and nocturia every 1-2 hour), with symptoms refractory to Flomax, finasteride, and oxybutynin. We discussed potential next steps today, including cystoscopy vs urodynamics. He is happy to go either direction at this time.  -Schedule next available cystoscopy with Dr. Zaldivar or Dr. Nicole.    Estrella Brewer, CNP  Department of Urology      Subjective:   65 year old male with a history of BPH with LUTS (frequency, hesitancy, dribbling stream, double-voiding and nocturia every 1-2 hour) who presents for follow up. I initially saw him for consult one month ago. Had taken both Flomax and finasteride for this, but felt that neither med impacted his symptoms. CICI completed at our visit, and an asymmetric prostate was appreciated. I therefore suggest we pursue an MRI to evaluate this further, which was completed on 9/5 and was fortunately graded as PIRADS 2, 33 gm prostate. Regarding his symptoms and lack of response to prostate medications, he was started on a trial of oxybutynin XL 10 mg daily.     Today, he states that his symptoms have been unchanged since starting the oxybutynin.       Objective:     Exam:   Patient is a 65 year old male   General Appearance: Well groomed, hygenic  Respiratory: No cough, no respiratory distress or labored breathing  Musculoskeletal: Grossly normal with no gross deficits  Skin: No discoloration or apparent rashes  Neurologic: No tremors  Psychiatric: Alert and  oriented  Further examination is deferred due to the nature of our visit.

## 2021-09-29 ENCOUNTER — TELEPHONE (OUTPATIENT)
Dept: UROLOGY | Facility: CLINIC | Age: 66
End: 2021-09-29

## 2021-09-29 NOTE — TELEPHONE ENCOUNTER
LVM for patient to -Schedule cystoscopy with the next available between Dr. Zaldivar and Dr. Nicole.

## 2021-10-09 ENCOUNTER — HEALTH MAINTENANCE LETTER (OUTPATIENT)
Age: 66
End: 2021-10-09

## 2021-11-02 ENCOUNTER — PRE VISIT (OUTPATIENT)
Dept: UROLOGY | Facility: CLINIC | Age: 66
End: 2021-11-02

## 2021-11-02 NOTE — TELEPHONE ENCOUNTER
Reason for Visit: Cystoscopy    Diagnosis: Benign prostatic hyperplasia with urinary frequency    Orders/Procedures/Records: in system    Contact Patient: n/a    Rooming Requirements: AUA, After Cysto Fow/PVR      Bertha Jerome  11/02/21  4:53 PM

## 2021-11-05 ENCOUNTER — OFFICE VISIT (OUTPATIENT)
Dept: UROLOGY | Facility: CLINIC | Age: 66
End: 2021-11-05
Payer: COMMERCIAL

## 2021-11-05 VITALS
BODY MASS INDEX: 28.49 KG/M2 | WEIGHT: 188 LBS | HEART RATE: 96 BPM | DIASTOLIC BLOOD PRESSURE: 69 MMHG | HEIGHT: 68 IN | SYSTOLIC BLOOD PRESSURE: 119 MMHG

## 2021-11-05 DIAGNOSIS — N13.9 LOWER URINARY OBSTRUCTIVE SYMPTOM: Primary | ICD-10-CM

## 2021-11-05 PROCEDURE — 99212 OFFICE O/P EST SF 10 MIN: CPT | Mod: 25 | Performed by: UROLOGY

## 2021-11-05 PROCEDURE — 52000 CYSTOURETHROSCOPY: CPT | Performed by: UROLOGY

## 2021-11-05 ASSESSMENT — PAIN SCALES - GENERAL: PAINLEVEL: NO PAIN (0)

## 2021-11-05 ASSESSMENT — MIFFLIN-ST. JEOR: SCORE: 1602.26

## 2021-11-05 NOTE — LETTER
11/5/2021       RE: Ming Garnett  [address] [place] [address]     Dear Colleague,    Thank you for referring your patient, Ming Garnett, to the [facility] UROLOGY CLINIC [place] at [facility]. Please see a copy of my visit note below.    [facility] COMPREHENSIVE LOWER URINARY TRACT SYMPTOM EVALUATION    Reason for cystoscopy: BPH and LUTS    Brief History: 64 yo male with a 33 ml prostate on MRI with LUTS despite tamsulosin, finasteride and oxybuytnin    CYSTOSCOPY  After obtaining informed consent, the patient was prepped and draped in the standard sterile fashion.  The 15 Lao flexible cystoscope was inserted through the urethral meatus.      The anterior urethra was:  normal without stricture.    The external sphincter was  appropriately coapted.   The prostatic urethra demonstrated bilobar hypertrophy with high riding bladder neck.  The bladder was  unremarkable for tumors, erythema or stones.    The ureteral orifices were identified on each side in orthotopic position with efflux of clear urine.   There were  trabeculations.  With posterior wall wide mouth diverticulum  On retroflexion there was the usual bladder neck hyperemia.    There was not intravesical protrusion of the prostate.      The patient tolerated the procedure well without complication.        EVALUATION AND MANAGEMENT   Upon completion of the cystoscopy the patient was brought to a separate examination room to discuss findings of above testing, review available treatment options and make a plan for further steps in care    Discussed options for care, including Rezum, HoLEP or bladder neck incision with pros, cons, risks and postoperative course of each.     He will consider his options and reach back out to us on his desired options.    SUMMARY:    10 minutes spent were spent on the E/M portion of the visit in a separate examination room after  cystoscopy discussing findings, available treatment options and next steps in care.    I, Elio Nicole, saw and evaluated this patient and agree with the plan as stated above.  I personally performed all listed procedures.

## 2021-11-05 NOTE — PATIENT INSTRUCTIONS
"Follow up in two weeks for a video visit with Dr. Nicole.    It was a pleasure meeting with you today.  Thank you for allowing me and my team the privilege of caring for you today.  YOU are the reason we are here, and I truly hope we provided you with the excellent service you deserve.  Please let us know if there is anything else we can do for you so that we can be sure you are leaving completely satisfied with your care experience.        Keke Oh CMA    AFTER YOUR CYSTOSCOPY        You have just completed a cystoscopy, or \"cysto\", which allowed your physician to learn more about your bladder (or to remove a stent placed after surgery). We suggest that you continue to avoid caffeine, fruit juice, and alcohol for the next 24 hours, however, you are encouraged to return to your normal activities.         A few things that are considered normal after your cystoscopy:     * Small amount of bleeding (or spotting) that clears within the next 24 hours     * Slight burning sensation with urination     * Sensation to of needing to avoid more frequently     * The feeling of \"air\" in your urine     * Mild discomfort that is relieved with Tylenol        Please contact our office promptly if you:     * Develop a fever above 101 degrees     * Are unable to urinate     * Develop bright red blood that does not stop     * Severe pain or swelling         Please contact our office with any concerns or questions @[email or web address].  "

## 2021-11-05 NOTE — NURSING NOTE
"Chief Complaint   Patient presents with     Cystoscopy     urinary urgency and frequency        Blood pressure 119/69, pulse 96, height 1.727 m (5' 8\"), weight 85.3 kg (188 lb). Body mass index is 28.59 kg/m .    Patient Active Problem List   Diagnosis     Benign essential hypertension     Insomnia, unspecified type     Mild major depression (H)     Erectile dysfunction, unspecified erectile dysfunction type     Hyperlipidemia LDL goal <130       No Known Allergies    Current Outpatient Medications   Medication Sig Dispense Refill     amLODIPine (NORVASC) 10 MG tablet TAKE 1 TABLET EVERY DAY 90 tablet 3     atenolol (TENORMIN) 50 MG tablet Take 1 tablet (50 mg) by mouth daily 90 tablet 3     atorvastatin (LIPITOR) 40 MG tablet TAKE 1 TABLET EVERY DAY 90 tablet 2     clotrimazole (LOTRIMIN) 1 % external cream Apply to affected area BID X 1 month supply 2 g 1     finasteride (PROSCAR) 5 MG tablet TAKE 1 TABLET EVERY  tablet 2     hydrochlorothiazide (HYDRODIURIL) 25 MG tablet TAKE 1 TABLET EVERY DAY 90 tablet 3     losartan (COZAAR) 100 MG tablet TAKE 1 TABLET EVERY DAY 90 tablet 3     sertraline (ZOLOFT) 100 MG tablet Take 1 tablet (100 mg) by mouth daily 90 tablet 3     sertraline (ZOLOFT) 50 MG tablet TAKE 1 TABLET EVERY DAY 90 tablet 3     traZODone (DESYREL) 50 MG tablet Take 1-2 tablets ( mg) by mouth nightly as needed for sleep 180 tablet 3     triamcinolone (KENALOG) 0.1 % external ointment APPLY 1 APPLICATION TWICE A DAY AS NEEDED TO BUTTOCK AREA 80 g 3     buPROPion (WELLBUTRIN XL) 150 MG 24 hr tablet Take 1 tablet (150 mg) by mouth every morning (Patient not taking: Reported on 2021) 90 tablet 1       Social History     Tobacco Use     Smoking status: Current Every Day Smoker     Packs/day: 0.50     Years: 1.00     Pack years: 0.50     Types: Cigarettes, Cigars     Start date: 2000     Last attempt to quit: 2019     Years since quittin.9     Smokeless tobacco: Never Used "   Substance Use Topics     Alcohol use: Yes     Alcohol/week: 0.0 standard drinks     Comment: a few beers per day      Drug use: Never       Keke OhILIANA  2021  4:30 PM     Invasive Procedure Safety Checklist:    Procedure: Cystoscopy     Action: Complete sections and checkboxes as appropriate.    Pre-procedure:  1. Patient ID Verified with 2 identifiers (Laly and  or MRN) : YES    2. Procedure and site verified with patient/designee (when able) : YES    3. Accurate consent documentation in medical record : YES    4. H&P (or appropriate assessment) documented in medical record : YES  H&P must be up to 30 days prior to procedure an updated within 24 hours of                 Procedure as applicable.     5. Relevant diagnostic and radiology test results appropriately labeled and displayed as applicable : YES    6. Blood products, implants, devices, and/or special equipment available for the procedure as applicable : YES    7. Procedure site(s) marked with provider initials [Exclusions: None] : NO    8. Marking not required. Reason : Yes  Procedure does not require site marking    Time Out:     Time-Out performed immediately prior to starting procedure, including verbal and active participation of all team members addressing: YES    1. Correct patient identity.  2. Confirmed that the correct side and site are marked.  3. An accurate procedure to be done.  4. Agreement on the procedure to be done.  5. Correct patient position.  6. Relevant images and results are properly labeled and appropriately displayed.  7. The need to administer antibiotics or fluids for irrigation purposes during the procedure as applicable.  8. Safety precautions based on patient history or medication use.    During Procedure: Verification of correct person, site, and procedure occurs any time the responsibility for care of the patient is transferred to another member of the care team.    No medications administered during this  procedure.    Keke Oh, ILIANA  November 5, 2021

## 2021-11-09 NOTE — PROGRESS NOTES
[facility] COMPREHENSIVE LOWER URINARY TRACT SYMPTOM EVALUATION    Reason for cystoscopy: BPH and LUTS    Brief History: 66 yo male with a 33 ml prostate on MRI with LUTS despite tamsulosin, finasteride and oxybuytnin    CYSTOSCOPY  After obtaining informed consent, the patient was prepped and draped in the standard sterile fashion.  The 15 [place] flexible cystoscope was inserted through the urethral meatus.      The anterior urethra was:  normal without stricture.    The external sphincter was  appropriately coapted.   The prostatic urethra demonstrated bilobar hypertrophy with high riding bladder neck.  The bladder was  unremarkable for tumors, erythema or stones.    The ureteral orifices were identified on each side in orthotopic position with efflux of clear urine.   There were  trabeculations.  With posterior wall wide mouth diverticulum  On retroflexion there was the usual bladder neck hyperemia.    There was not intravesical protrusion of the prostate.      The patient tolerated the procedure well without complication.        EVALUATION AND MANAGEMENT   Upon completion of the cystoscopy the patient was brought to a separate examination room to discuss findings of above testing, review available treatment options and make a plan for further steps in care    Discussed options for care, including Rezum, HoLEP or bladder neck incision with pros, cons, risks and postoperative course of each.     He will consider his options and reach back out to us on his desired options.    SUMMARY:    10 minutes spent were spent on the E/M portion of the visit in a separate examination room after cystoscopy discussing findings, available treatment options and next steps in care.    IElio, saw and evaluated this patient and agree with the plan as stated above.  I personally performed all listed procedures.

## 2021-12-04 ENCOUNTER — HEALTH MAINTENANCE LETTER (OUTPATIENT)
Age: 66
End: 2021-12-04

## 2022-01-25 ENCOUNTER — TELEPHONE (OUTPATIENT)
Dept: UROLOGY | Facility: CLINIC | Age: 67
End: 2022-01-25
Payer: COMMERCIAL

## 2022-01-25 NOTE — CONFIDENTIAL NOTE
Returned patients voicemail requesting to schedule a procedure. Asked patient to clarify if he is wanting to schedule a Holep or a Rezume as there is no case request entered. Gave patient my direct extension to call back. [phone] Alejandra BLANK Jessica-Operative Coordinator for Urology Surgery

## 2022-01-28 NOTE — CONFIDENTIAL NOTE
Returned patients call wanting to schedule a holep procedure with Dr. Nicole, there was no answer. I left message for patient letting him know that we do not have a case request In the system but that I would request case request be entered for a holep procedure. I informed patient that once I have a case request I will call him to schedule surgery. I also informed patient that Dr. Nicole would be going on a leave shortly and that surgeries are booking into mid April-May at this time  Left my direct line [phone] or clinic line for any additional questions or concerns. Note routed to Dr. Nicole to place a case request. Alejandra BLANK Jessica-Op Coordinator for Urology Surgery

## 2022-02-02 ENCOUNTER — OFFICE VISIT (OUTPATIENT)
Dept: FAMILY MEDICINE | Facility: CLINIC | Age: 67
End: 2022-02-02
Payer: COMMERCIAL

## 2022-02-02 ENCOUNTER — TELEPHONE (OUTPATIENT)
Dept: FAMILY MEDICINE | Facility: CLINIC | Age: 67
End: 2022-02-02

## 2022-02-02 VITALS
HEIGHT: 68 IN | TEMPERATURE: 97.7 F | OXYGEN SATURATION: 97 % | DIASTOLIC BLOOD PRESSURE: 70 MMHG | HEART RATE: 71 BPM | WEIGHT: 184 LBS | BODY MASS INDEX: 27.89 KG/M2 | RESPIRATION RATE: 16 BRPM | SYSTOLIC BLOOD PRESSURE: 128 MMHG

## 2022-02-02 DIAGNOSIS — M17.12 PRIMARY OSTEOARTHRITIS OF LEFT KNEE: ICD-10-CM

## 2022-02-02 DIAGNOSIS — G47.00 INSOMNIA, UNSPECIFIED TYPE: Primary | ICD-10-CM

## 2022-02-02 DIAGNOSIS — F17.200 NICOTINE DEPENDENCE, UNCOMPLICATED, UNSPECIFIED NICOTINE PRODUCT TYPE: ICD-10-CM

## 2022-02-02 DIAGNOSIS — Z91.89 FRAMINGHAM CARDIAC RISK >20% IN NEXT 10 YEARS: ICD-10-CM

## 2022-02-02 DIAGNOSIS — Z00.00 ENCOUNTER FOR MEDICARE ANNUAL WELLNESS EXAM: ICD-10-CM

## 2022-02-02 DIAGNOSIS — F51.01 PRIMARY INSOMNIA: Primary | ICD-10-CM

## 2022-02-02 DIAGNOSIS — Z87.891 ENCOUNTER FOR SCREENING FOR ABDOMINAL AORTIC ANEURYSM (AAA) IN PATIENT 50 YEARS OF AGE OR OLDER WITH HISTORY OF SMOKING: ICD-10-CM

## 2022-02-02 DIAGNOSIS — F41.1 GAD (GENERALIZED ANXIETY DISORDER): ICD-10-CM

## 2022-02-02 DIAGNOSIS — N18.31 STAGE 3A CHRONIC KIDNEY DISEASE (H): ICD-10-CM

## 2022-02-02 DIAGNOSIS — I10 BENIGN ESSENTIAL HYPERTENSION: ICD-10-CM

## 2022-02-02 DIAGNOSIS — F17.210 NICOTINE DEPENDENCE, CIGARETTES, UNCOMPLICATED: ICD-10-CM

## 2022-02-02 DIAGNOSIS — Z12.11 SCREEN FOR COLON CANCER: ICD-10-CM

## 2022-02-02 DIAGNOSIS — N40.1 BENIGN PROSTATIC HYPERPLASIA WITH NOCTURIA: ICD-10-CM

## 2022-02-02 DIAGNOSIS — Z13.6 ENCOUNTER FOR SCREENING FOR ABDOMINAL AORTIC ANEURYSM (AAA) IN PATIENT 50 YEARS OF AGE OR OLDER WITH HISTORY OF SMOKING: ICD-10-CM

## 2022-02-02 DIAGNOSIS — F32.0 MILD MAJOR DEPRESSION (H): ICD-10-CM

## 2022-02-02 DIAGNOSIS — R35.1 BENIGN PROSTATIC HYPERPLASIA WITH NOCTURIA: ICD-10-CM

## 2022-02-02 LAB
ALBUMIN SERPL-MCNC: 3.6 G/DL (ref 3.4–5)
ALBUMIN UR-MCNC: 100 MG/DL
ALP SERPL-CCNC: 134 U/L (ref 40–150)
ALT SERPL W P-5'-P-CCNC: 43 U/L (ref 0–70)
ANION GAP SERPL CALCULATED.3IONS-SCNC: 6 MMOL/L (ref 3–14)
APPEARANCE UR: CLEAR
AST SERPL W P-5'-P-CCNC: 26 U/L (ref 0–45)
BACTERIA #/AREA URNS HPF: ABNORMAL /HPF
BILIRUB SERPL-MCNC: 0.5 MG/DL (ref 0.2–1.3)
BILIRUB UR QL STRIP: NEGATIVE
BUN SERPL-MCNC: 20 MG/DL (ref 7–30)
CALCIUM SERPL-MCNC: 9.5 MG/DL (ref 8.5–10.1)
CHLORIDE BLD-SCNC: 104 MMOL/L (ref 94–109)
CHOLEST SERPL-MCNC: 143 MG/DL
CO2 SERPL-SCNC: 26 MMOL/L (ref 20–32)
COLOR UR AUTO: YELLOW
CREAT SERPL-MCNC: 1.26 MG/DL (ref 0.66–1.25)
ERYTHROCYTE [DISTWIDTH] IN BLOOD BY AUTOMATED COUNT: 12.7 % (ref 10–15)
FASTING STATUS PATIENT QL REPORTED: NORMAL
GFR SERPL CREATININE-BSD FRML MDRD: 62 ML/MIN/1.73M2
GLUCOSE BLD-MCNC: 101 MG/DL (ref 70–99)
GLUCOSE UR STRIP-MCNC: NEGATIVE MG/DL
HBA1C MFR BLD: 5.7 % (ref 0–5.6)
HCT VFR BLD AUTO: 43 % (ref 40–53)
HDLC SERPL-MCNC: 46 MG/DL
HGB BLD-MCNC: 15.1 G/DL (ref 13.3–17.7)
HGB UR QL STRIP: NEGATIVE
KETONES UR STRIP-MCNC: NEGATIVE MG/DL
LDLC SERPL CALC-MCNC: 68 MG/DL
LEUKOCYTE ESTERASE UR QL STRIP: NEGATIVE
MCH RBC QN AUTO: 32 PG (ref 26.5–33)
MCHC RBC AUTO-ENTMCNC: 35.1 G/DL (ref 31.5–36.5)
MCV RBC AUTO: 91 FL (ref 78–100)
NITRATE UR QL: NEGATIVE
NONHDLC SERPL-MCNC: 97 MG/DL
PH UR STRIP: 6.5 [PH] (ref 5–7)
PLATELET # BLD AUTO: 291 10E3/UL (ref 150–450)
POTASSIUM BLD-SCNC: 3.6 MMOL/L (ref 3.4–5.3)
PROT SERPL-MCNC: 7.9 G/DL (ref 6.8–8.8)
RBC # BLD AUTO: 4.72 10E6/UL (ref 4.4–5.9)
RBC #/AREA URNS AUTO: ABNORMAL /HPF
SODIUM SERPL-SCNC: 136 MMOL/L (ref 133–144)
SP GR UR STRIP: 1.02 (ref 1–1.03)
TRIGL SERPL-MCNC: 145 MG/DL
TSH SERPL DL<=0.005 MIU/L-ACNC: 1.78 MU/L (ref 0.4–4)
UROBILINOGEN UR STRIP-ACNC: 1 E.U./DL
WBC # BLD AUTO: 11.1 10E3/UL (ref 4–11)
WBC #/AREA URNS AUTO: ABNORMAL /HPF

## 2022-02-02 PROCEDURE — 36415 COLL VENOUS BLD VENIPUNCTURE: CPT | Performed by: NURSE PRACTITIONER

## 2022-02-02 PROCEDURE — 81001 URINALYSIS AUTO W/SCOPE: CPT | Performed by: NURSE PRACTITIONER

## 2022-02-02 PROCEDURE — 99214 OFFICE O/P EST MOD 30 MIN: CPT | Mod: 25 | Performed by: NURSE PRACTITIONER

## 2022-02-02 PROCEDURE — 85027 COMPLETE CBC AUTOMATED: CPT | Performed by: NURSE PRACTITIONER

## 2022-02-02 PROCEDURE — 80053 COMPREHEN METABOLIC PANEL: CPT | Performed by: NURSE PRACTITIONER

## 2022-02-02 PROCEDURE — 99407 BEHAV CHNG SMOKING > 10 MIN: CPT | Performed by: NURSE PRACTITIONER

## 2022-02-02 PROCEDURE — 83036 HEMOGLOBIN GLYCOSYLATED A1C: CPT | Performed by: NURSE PRACTITIONER

## 2022-02-02 PROCEDURE — 99397 PER PM REEVAL EST PAT 65+ YR: CPT | Performed by: NURSE PRACTITIONER

## 2022-02-02 PROCEDURE — 84443 ASSAY THYROID STIM HORMONE: CPT | Performed by: NURSE PRACTITIONER

## 2022-02-02 PROCEDURE — 80061 LIPID PANEL: CPT | Performed by: NURSE PRACTITIONER

## 2022-02-02 RX ORDER — ATENOLOL 50 MG/1
50 TABLET ORAL DAILY
Qty: 90 TABLET | Refills: 3 | Status: SHIPPED | OUTPATIENT
Start: 2022-02-02 | End: 2023-03-02

## 2022-02-02 RX ORDER — ATORVASTATIN CALCIUM 40 MG/1
40 TABLET, FILM COATED ORAL DAILY
Qty: 90 TABLET | Refills: 3 | Status: SHIPPED | OUTPATIENT
Start: 2022-02-02 | End: 2023-03-02

## 2022-02-02 RX ORDER — AMLODIPINE BESYLATE 10 MG/1
TABLET ORAL
Qty: 90 TABLET | Refills: 3 | Status: SHIPPED | OUTPATIENT
Start: 2022-02-02 | End: 2023-03-02

## 2022-02-02 RX ORDER — DOXEPIN 3 MG/1
3 TABLET, FILM COATED ORAL AT BEDTIME
Qty: 30 TABLET | Refills: 0 | Status: SHIPPED | OUTPATIENT
Start: 2022-02-02 | End: 2022-03-04

## 2022-02-02 RX ORDER — TAMSULOSIN HYDROCHLORIDE 0.4 MG/1
0.8 CAPSULE ORAL DAILY
Qty: 60 CAPSULE | Refills: 0 | Status: SHIPPED | OUTPATIENT
Start: 2022-02-02 | End: 2022-03-04

## 2022-02-02 RX ORDER — NICOTINE 21 MG/24HR
1 PATCH, TRANSDERMAL 24 HOURS TRANSDERMAL EVERY 24 HOURS
Qty: 28 PATCH | Refills: 2 | Status: SHIPPED | OUTPATIENT
Start: 2022-02-02 | End: 2023-05-12

## 2022-02-02 RX ORDER — SERTRALINE HYDROCHLORIDE 100 MG/1
100 TABLET, FILM COATED ORAL DAILY
Qty: 90 TABLET | Refills: 3 | Status: SHIPPED | OUTPATIENT
Start: 2022-02-02 | End: 2023-03-02

## 2022-02-02 RX ORDER — LOSARTAN POTASSIUM 100 MG/1
TABLET ORAL
Qty: 90 TABLET | Refills: 3 | Status: SHIPPED | OUTPATIENT
Start: 2022-02-02 | End: 2023-03-02

## 2022-02-02 RX ORDER — HYDROCHLOROTHIAZIDE 25 MG/1
TABLET ORAL
Qty: 90 TABLET | Refills: 3 | Status: SHIPPED | OUTPATIENT
Start: 2022-02-02 | End: 2023-03-02

## 2022-02-02 ASSESSMENT — ENCOUNTER SYMPTOMS
NAUSEA: 0
CHILLS: 0
COUGH: 0
PALPITATIONS: 0
ARTHRALGIAS: 1
MYALGIAS: 1
FEVER: 0
DIARRHEA: 0
ABDOMINAL PAIN: 0
HEADACHES: 0
FREQUENCY: 1
DYSURIA: 0
CONSTIPATION: 0
SHORTNESS OF BREATH: 0
NERVOUS/ANXIOUS: 0
HEMATURIA: 0
HEMATOCHEZIA: 0
JOINT SWELLING: 0
PARESTHESIAS: 0
SORE THROAT: 0
HEARTBURN: 0
EYE PAIN: 0
DIZZINESS: 0
WEAKNESS: 0

## 2022-02-02 ASSESSMENT — MIFFLIN-ST. JEOR: SCORE: 1579.12

## 2022-02-02 ASSESSMENT — ACTIVITIES OF DAILY LIVING (ADL): CURRENT_FUNCTION: NO ASSISTANCE NEEDED

## 2022-02-02 NOTE — TELEPHONE ENCOUNTER
Prior Authorization Retail Medication Request    Medication/Dose: doxepin (SILENOR) 3 MG tablet  ICD code (if different than what is on RX):  Previously Tried and Failed:  Rationale:    Insurance Name:    Insurance ID: [id number]    Pharmacy Information (if different than what is on RX)  Name:  Phone:    Please include previous medications tried and failed.  Please ask insurance for medications on formulary.

## 2022-02-02 NOTE — PROGRESS NOTES
"SUBJECTIVE:   Ming Garnett is a 66 year old male who presents for Preventive Visit.    66 yo male with PMH of BPH, HLD. HTN, Depression and Insomnia in clinic for preventive exam with c/o ongoing insomnia and nocturia.     -BPH: Last seen by urology specialist  11/2021 r/t BPH w/ 33 ml prostate on MRI with LUTS despite tamsulosin, finasteride and oxybutynin. Patient continues to have symptomatic nocturia interfering with his sleep causing daytime dowsiness. Pending surgical intervention of HoLEP.       -Insomnia currently on trazodone  mg nightly prn unable to tolerate to side effects. Trialed Melatonin without effectiveness; insomnia occurring despite BPH      Patient has been advised of split billing requirements and indicates understanding: Yes     Are you in the first 12 months of your Medicare coverage?  No    Healthy Habits:     In general, how would you rate your overall health?  Good    Frequency of exercise:  None    Do you usually eat at least 4 servings of fruit and vegetables a day, include whole grains    & fiber and avoid regularly eating high fat or \"junk\" foods?  Yes    Taking medications regularly:  Yes    Medication side effects:  None    Ability to successfully perform activities of daily living:  No assistance needed    Home Safety:  No safety concerns identified    Hearing Impairment:  Need to ask people to speak up or repeat themselves and no hearing concerns    In the past 6 months, have you been bothered by leaking of urine? Yes    In general, how would you rate your overall mental or emotional health?  Good      PHQ-2 Total Score: 0    Additional concerns today:  Yes    Do you feel safe in your environment? Yes    Have you ever done Advance Care Planning? (For example, a Health Directive, POLST, or a discussion with a medical provider or your loved ones about your wishes): No, advance care planning information given to patient to review.  Patient plans to discuss their wishes with " loved ones or provider.       Fall risk  Fallen 2 or more times in the past year?: No  Any fall with injury in the past year?: No  click delete button to remove this line now    Cognitive Screening   1) Repeat 3 items (Leader, Season, Table)    2) Clock draw: NORMAL  3) 3 item recall: Recalls 2 objects   Results: NORMAL clock, 1-2 items recalled: COGNITIVE IMPAIRMENT LESS LIKELY    Mini-CogTM Copyright HENRY Cruz. Licensed by the author for use in [facility]; reprinted with permission (greg@[facility]). All rights reserved.      Do you have sleep apnea, excessive snoring or daytime drowsiness?: yes    Reviewed and updated as needed this visit by clinical staff  Tobacco  Allergies  Meds  Problems  Med Hx  Surg Hx  Fam Hx  Soc Hx     Reviewed and updated as needed this visit by Provider  Tobacco  Allergies  Meds  Problems  Med Hx  Surg Hx  Fam Hx        Social History     Tobacco Use     Smoking status: Current Every Day Smoker     Packs/day: 0.50     Years: 1.00     Pack years: 0.50     Types: Cigarettes, Cigars     Start date: 2000     Last attempt to quit: 2019     Years since quittin.2     Smokeless tobacco: Never Used   Substance Use Topics     Alcohol use: Yes     Alcohol/week: 0.0 standard drinks     Comment: a few beers per day      If you drink alcohol do you typically have >3 drinks per day or >7 drinks per week? Yes    Alcohol Use 2022   Prescreen: >3 drinks/day or >7 drinks/week? Yes   Prescreen: >3 drinks/day or >7 drinks/week? -   AUDIT SCORE  6     AUDIT - Alcohol Use Disorders Identification Test - Reproduced from the World Health Organization Audit 2001 (Second Edition) 2022   1.  How often do you have a drink containing alcohol? 2 to 3 times a week   2.  How many drinks containing alcohol do you have on a typical day when you are drinking? 1 or 2   3.  How often do you have five or more drinks on one occasion? Weekly   4.  How often during the last year  have you found that you were not able to stop drinking once you had started? Never   5.  How often during the last year have you failed to do what was normally expected of you because of drinking? Never   6.  How often during the last year have you needed a first drink in the morning to get yourself going after a heavy drinking session? Never   7.  How often during the last year have you had a feeling of guilt or remorse after drinking? Never   8.  How often during the last year have you been unable to remember what happened the night before because of your drinking? Never   9.  Have you or someone else been injured because of your drinking? No   10. Has a relative, friend, doctor or other health care worker been concerned about your drinking or suggested you cut down? No   TOTAL SCORE 6     PHQ 1/2/2020 9/15/2020 8/24/2021   PHQ-9 Total Score 2 5 0   Q9: Thoughts of better off dead/self-harm past 2 weeks Not at all Not at all Not at all     No flowsheet data found.    Current providers sharing in care for this patient include:   Patient Care Team:  Cong Ochoa APRN CNP as PCP - General (Family Medicine)  Nina Adorno MD as Assigned PCP  Amira Rosado PA-C as Physician Assistant (Physician Assistant)  Shima Juarez RN as Specialty Care Coordinator (Urology)  Nina Adorno MD as Referring Physician (Family Medicine)  Estrella Brewer CNP as Nurse Practitioner (Urology)  Krystina Flynn RN as Specialty Care Coordinator (Urology)  Estrella Brewer CNP as Assigned Surgical Provider    The following health maintenance items are reviewed in Epic and correct as of today:  Health Maintenance Due   Topic Date Due     COLORECTAL CANCER SCREENING  07/30/2020     PHQ-9  02/24/2022     Lab work is in process  Labs reviewed in EPIC  BP Readings from Last 3 Encounters:   02/02/22 128/70   11/05/21 119/69   08/24/21 (!) 145/90    Wt Readings from Last 3 Encounters:  "  02/02/22 83.5 kg (184 lb)   11/05/21 85.3 kg (188 lb)   08/24/21 88.5 kg (195 lb)         Pneumonia Vaccine:Adults age 65+ who received Pneumovax (PPSV23) at 65 years or older: Should be given PCV13 > 1 year after their most recent PPSV23    Review of Systems   Constitutional: Negative for chills and fever.   HENT: Positive for congestion and hearing loss. Negative for ear pain and sore throat.    Eyes: Negative for pain and visual disturbance.   Respiratory: Negative for cough and shortness of breath.    Cardiovascular: Negative for chest pain, palpitations and peripheral edema.   Gastrointestinal: Negative for abdominal pain, constipation, diarrhea, heartburn, hematochezia and nausea.   Genitourinary: Positive for frequency, impotence and urgency. Negative for dysuria, genital sores, hematuria and penile discharge.   Musculoskeletal: Positive for arthralgias and myalgias. Negative for joint swelling.   Skin: Negative for rash.   Neurological: Negative for dizziness, weakness, headaches and paresthesias.   Psychiatric/Behavioral: Negative for mood changes. The patient is not nervous/anxious.      OBJECTIVE:   /70 (BP Location: Right arm, Patient Position: Chair, Cuff Size: Adult Regular)   Pulse 71   Temp 97.7  F (36.5  C) (Temporal)   Resp 16   Ht 1.727 m (5' 8\")   Wt 83.5 kg (184 lb)   SpO2 97%   BMI 27.98 kg/m   Estimated body mass index is 27.98 kg/m  as calculated from the following:    Height as of this encounter: 1.727 m (5' 8\").    Weight as of this encounter: 83.5 kg (184 lb).     Physical Exam  GENERAL: healthy, alert and no distress  EYES: Eyes grossly normal to inspection, PERRL and conjunctivae and sclerae normal  HENT: ear canals and TM's normal, nose and mouth without ulcers or lesions  NECK: no adenopathy, no asymmetry, masses, or scars and thyroid normal to palpation  RESP: lungs clear to auscultation - no rales, rhonchi or wheezes  CV: regular rate and rhythm, normal S1 S2, no S3 or " S4, no murmur, click or rub, no peripheral edema and peripheral pulses strong  ABDOMEN: soft, nontender, no hepatosplenomegaly, no masses and bowel sounds normal  MS: no gross musculoskeletal defects noted, no edema  SKIN: no suspicious lesions or rashes  NEURO: Normal strength and tone, mentation intact and speech normal  PSYCH: mentation appears normal, affect normal/bright    Diagnostic Test Results:  Labs reviewed in Epic  Results for orders placed or performed in visit on 02/02/22   UA Macro with Reflex to Micro and Culture - lab collect     Status: Abnormal    Specimen: Urine, Midstream   Result Value Ref Range    Color Urine Yellow Colorless, Straw, Light Yellow, Yellow    Appearance Urine Clear Clear    Glucose Urine Negative Negative mg/dL    Bilirubin Urine Negative Negative    Ketones Urine Negative Negative mg/dL    Specific Gravity Urine 1.025 1.003 - 1.035    Blood Urine Negative Negative    pH Urine 6.5 5.0 - 7.0    Protein Albumin Urine 100  (A) Negative mg/dL    Urobilinogen Urine 1.0 0.2, 1.0 E.U./dL    Nitrite Urine Negative Negative    Leukocyte Esterase Urine Negative Negative   CBC with platelets     Status: Abnormal   Result Value Ref Range    WBC Count 11.1 (H) 4.0 - 11.0 10e3/uL    RBC Count 4.72 4.40 - 5.90 10e6/uL    Hemoglobin 15.1 13.3 - 17.7 g/dL    Hematocrit 43.0 40.0 - 53.0 %    MCV 91 78 - 100 fL    MCH 32.0 26.5 - 33.0 pg    MCHC 35.1 31.5 - 36.5 g/dL    RDW 12.7 10.0 - 15.0 %    Platelet Count 291 150 - 450 10e3/uL   Comprehensive metabolic panel     Status: Abnormal   Result Value Ref Range    Sodium 136 133 - 144 mmol/L    Potassium 3.6 3.4 - 5.3 mmol/L    Chloride 104 94 - 109 mmol/L    Carbon Dioxide (CO2) 26 20 - 32 mmol/L    Anion Gap 6 3 - 14 mmol/L    Urea Nitrogen 20 7 - 30 mg/dL    Creatinine 1.26 (H) 0.66 - 1.25 mg/dL    Calcium 9.5 8.5 - 10.1 mg/dL    Glucose 101 (H) 70 - 99 mg/dL    Alkaline Phosphatase 134 40 - 150 U/L    AST 26 0 - 45 U/L    ALT 43 0 - 70 U/L     Protein Total 7.9 6.8 - 8.8 g/dL    Albumin 3.6 3.4 - 5.0 g/dL    Bilirubin Total 0.5 0.2 - 1.3 mg/dL    GFR Estimate 62 >60 mL/min/1.73m2   Hemoglobin A1c     Status: Abnormal   Result Value Ref Range    Hemoglobin A1C 5.7 (H) 0.0 - 5.6 %   Lipid panel reflex to direct LDL Fasting     Status: None   Result Value Ref Range    Cholesterol 143 <200 mg/dL    Triglycerides 145 <150 mg/dL    Direct Measure HDL 46 >=40 mg/dL    LDL Cholesterol Calculated 68 <=100 mg/dL    Non HDL Cholesterol 97 <130 mg/dL    Patient Fasting > 8hrs? Unknown     Narrative    Cholesterol  Desirable:  <200 mg/dL    Triglycerides  Normal:  Less than 150 mg/dL  Borderline High:  150-199 mg/dL  High:  200-499 mg/dL  Very High:  Greater than or equal to 500 mg/dL    Direct Measure HDL  Female:  Greater than or equal to 50 mg/dL   Male:  Greater than or equal to 40 mg/dL    LDL Cholesterol  Desirable:  <100mg/dL  Above Desirable:  100-129 mg/dL   Borderline High:  130-159 mg/dL   High:  160-189 mg/dL   Very High:  >= 190 mg/dL    Non HDL Cholesterol  Desirable:  130 mg/dL  Above Desirable:  130-159 mg/dL  Borderline High:  160-189 mg/dL  High:  190-219 mg/dL  Very High:  Greater than or equal to 220 mg/dL   TSH with free T4 reflex     Status: Normal   Result Value Ref Range    TSH 1.78 0.40 - 4.00 mU/L   Urine Microscopic     Status: Abnormal   Result Value Ref Range    Bacteria Urine Few (A) None Seen /HPF    RBC Urine None Seen 0-2 /HPF /HPF    WBC Urine None Seen 0-5 /HPF /HPF    Narrative    Urine Culture not indicated       ASSESSMENT / PLAN:   Ming was seen today for physical and establish care.    Diagnoses and all orders for this visit:    Insomnia, unspecified type  Failed melatonin and trazodone  Plan:  - trial doxepin (SILENOR) 3 MG tablet; Take 1 tablet (3 mg) by mouth At Bedtime    Encounter for Medicare annual wellness exam  No concerning abnormalities on exam; will obtain annual labs  Plan:  -     CBC with platelets; Future  -      Comprehensive metabolic panel; Future  -     Hemoglobin A1c; Future  -     Lipid panel reflex to direct LDL Fasting; Future  -     TSH with free T4 reflex; Future    Benign essential hypertension  BP within goal <140/90; continue current regimen  Plan:  -     amLODIPine (NORVASC) 10 MG tablet; TAKE 1 TABLET EVERY DAY  -     atenolol (TENORMIN) 50 MG tablet; Take 1 tablet (50 mg) by mouth daily  -     hydrochlorothiazide (HYDRODIURIL) 25 MG tablet; TAKE 1 TABLET EVERY DAY  -     losartan (COZAAR) 100 MG tablet; TAKE 1 TABLET EVERY DAY    [place] cardiac risk >20% in next 10 years  The 10-year ASCVD risk score (Bry MANUEL Jr., et al., 2013) is: 20%  Plan:  -  continue atorvastatin (LIPITOR) 40 MG tablet; Take 1 tablet (40 mg) by mouth daily    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4 MG capsule; Take 2 capsules (0.8 mg) by       mouth daily  -     UA Macro with Reflex to Micro and Culture - lab collect  -     Urine Microscopic    Primary osteoarthritis of left knee  -     Trial diclofenac (VOLTAREN) 1 % topical gel; Apply 4 g topically 4 times daily for     Encounter for screening for abdominal aortic aneurysm (AAA) in patient 50 years of age or older with history of smoking  -     Abdominal Aortic Aneurysm Screening/Tracking  -     US Abdominal Aorta Imaging; Future    Nicotine dependence, cigarettes, uncomplicated   -     SMOKING  "CESSATION COUNSELING >10 MIN    Nicotine dependence, uncomplicated, unspecified nicotine product type  -     SMOKING CESSATION COUNSELING >10 MIN  -     nicotine (NICODERM CQ) 21 MG/24HR 24 hr patch; Place 1 patch onto the skin every 24 hours  -     nicotine (NICORETTE) 2 MG gum; Place 1 each (2 mg) inside cheek every hour as needed for smoking cessation Place 1 each inside cheek as needed for smoking cessation.    Other orders  -     REVIEW OF HEALTH MAINTENANCE PROTOCOL ORDERS    Patient has been advised of split billing requirements and indicates understanding: Yes    COUNSELING:  Reviewed preventive health counseling, as reflected in patient instructions       Consider AAA screening for ages 65-75 and smoking history       Healthy diet/nutrition       Fall risk prevention       Consider lung cancer screening for ages 55-80 years and 30 pack-year smoking history        Colon cancer screening       Prostate cancer screening    Estimated body mass index is 27.98 kg/m  as calculated from the following:    Height as of this encounter: 1.727 m (5' 8\").    Weight as of this encounter: 83.5 kg (184 lb).    Weight management plan: Discussed healthy diet and exercise guidelines    He reports that he has been smoking cigarettes and cigars. He started smoking about 22 years ago. He has a 0.50 pack-year smoking history. He has never used smokeless tobacco.     Tobacco Cessation Action Plan:   Pharmacotherapies : Nicotine patch and other Nicotine replacement    Appropriate preventive services were discussed with this patient, including applicable screening as appropriate for cardiovascular disease, diabetes, osteopenia/osteoporosis, and glaucoma.  As appropriate for age/gender, discussed screening for colorectal cancer, prostate cancer, breast cancer, and cervical cancer. Checklist reviewing preventive services available has been given to the patient.    Reviewed patients plan of care and provided an AVS. The Complex Care " Plan (for patients with higher acuity and needing more deliberate coordination of services) for Ming meets the Care Plan requirement. This Care Plan has been established and reviewed with the Patient.    Counseling Resources:  ATP IV Guidelines  Pooled Cohorts Equation Calculator  Breast Cancer Risk Calculator  Breast Cancer: Medication to Reduce Risk  FRAX Risk Assessment  ICSI Preventive Guidelines  Dietary Guidelines for Americans, 2010  [employer]'s MyPlate  ASA Prophylaxis  Lung CA Screening    CORNELIO Villasenor [facility]    Identified Health Risks:

## 2022-02-02 NOTE — PATIENT INSTRUCTIONS
Patient Education   Personalized Prevention Plan  You are due for the preventive services outlined below.  Your care team is available to assist you in scheduling these services.  If you have already completed any of these items, please share that information with your care team to update in your medical record.  Health Maintenance Due   Topic Date Due     ANNUAL REVIEW OF HM ORDERS  Never done     AORTIC ANEURYSM SCREENING (SYSTEM ASSIGNED)  Never done     Colorectal Cancer Screening  07/30/2020     Annual Wellness Visit  09/15/2021     FALL RISK ASSESSMENT  09/15/2021     Depression Assessment  02/24/2022        HOW TO QUIT SMOKING  Smoking is one of the hardest habits to break. About half of all those who have ever smoked have been able to quit, and most of those (about 70%) who still smoke want to quit. Here are some of the best ways to stop smoking.     KEEP TRYING:  It takes most smokers about 8 tries before they are finally able to fully quit. So, the more often you try and fail, the better your chance of quitting the next time! So, don't give up!    GO COLD TURKEY:  Most ex-smokers quit cold turkey. Trying to cut back gradually doesn't seem to work as well, perhaps because it continues the smoking habit. Also, it is possible to fool yourself by inhaling more while smoking fewer cigarettes. This results in the same amount of nicotine in your body!    GET SUPPORT:  Support programs can make an important difference, especially for the heavy smoker. These groups offer lectures, methods to change your behavior and peer support. Call the free national Quitline for more information. 800-QUIT-NOW (321-236-0537). Low-cost or free programs are offered by many hospitals, local chapters of the American Lung Association (655-317-5730) and the American Cancer Society (183-530-2523). Support at home is important too. Non-smokers can help by offering praise and encouragement. If the smoker fails to quit, encourage them to  try again!    OVER-THE-COUNTER MEDICINES:  For those who can't quit on their own, Nicotine Replacement Therapy (NRT) may make quitting much easier. Certain aids such as the nicotine patch, gum and lozenge are available without a prescription. However, it is best to use these under the guidance of your doctor. The skin patch provides a steady supply of nicotine to the body. Nicotine gum and lozenge gives temporary bursts of low levels of nicotine. Both methods take the edge off the craving for cigarettes. WARNING: If you feel symptoms of nicotine overdose, such as nausea, vomiting, dizziness, weakness, or fast heartbeat, stop using these and see your doctor.    PRESCRIPTION MEDICINES:  After evaluating your smoking patterns and prior attempts at quitting, your doctor may offer a prescription medicine such as bupropion (Zyban, Wellbutrin), varenicline (Chantix, Champix), a niocotine inhaler or nasal spray. Each has its unique advantage and side effects which your doctor can review with you.    HEALTH BENEFITS OF QUITTING:  The benefits of quitting start right away and keep improving the longer you go without smokin minutes: blood pressure and pulse return to normal  8 hours: oxygen levels return to normal  2 days: ability to smell and taste begins to improve as damaged nerves start to regrow  2-3 weeks: circulation and lung function improves  1-9 months: decreased cough, congestion and shortness of breath; less tired  1 year: risk of heart attack decreases by half  5 years: risk of lung cancer decreases by half; risk of stroke becomes the same as a non-smoker  For information about how to quit smoking, visit the following links:  National Cancer Rising Star ,   Clearing the Air, Quit Smoking Today   - an online booklet. http://www.smokefree.gov/pubs/clearing_the_air.pdf  Smokefree.gov http://smokefree.gov/  QuitNet http://www.quitnet.com/    5381-1103 Rick Acevedo, [address], [place] [address]. All  rights reserved. This information is not intended as a substitute for professional medical care. Always follow your healthcare professional's instructions.

## 2022-02-07 PROBLEM — F32.0 MILD MAJOR DEPRESSION (H): Status: ACTIVE | Noted: 2022-02-07

## 2022-02-07 PROBLEM — N18.31 STAGE 3A CHRONIC KIDNEY DISEASE (H): Status: ACTIVE | Noted: 2022-02-07

## 2022-02-09 NOTE — TELEPHONE ENCOUNTER
PRIOR AUTHORIZATION DENIED    Medication: doxepin (SILENOR) 3 MG tablet, rec 11-21-53    Denial Date: 2/9/2022    Denial Rational:  Patient must have a history of trial & failure to the formulary alternative(s) or have a contraindication or intolerance to the formulary alternatives:              Appeal Information:    If you would like to appeal, please supply P/A team with a letter of medical necessity with clinical reason.

## 2022-02-09 NOTE — TELEPHONE ENCOUNTER
Central Prior Authorization Team   Phone: [phone]    PA Initiation    Medication: doxepin (SILENOR) 3 MG tablet, rec 11-21-53  Insurance Company: [employer] ([facility]) - Phone [phone] Fax [phone]  Pharmacy Filling the Rx: [id number] - [place], [place] - [address]  Filling Pharmacy Phone: [phone]  Filling Pharmacy Fax:    Start Date: 2/9/2022

## 2022-02-10 RX ORDER — ZOLPIDEM TARTRATE 5 MG/1
TABLET ORAL
Qty: 1 TABLET | Refills: 0 | Status: SHIPPED | OUTPATIENT
Start: 2022-02-10 | End: 2022-02-10

## 2022-02-10 RX ORDER — ZOLPIDEM TARTRATE 5 MG/1
5 TABLET ORAL
Qty: 30 TABLET | Refills: 1 | Status: SHIPPED | OUTPATIENT
Start: 2022-02-10 | End: 2022-06-30

## 2022-02-10 NOTE — TELEPHONE ENCOUNTER
Please notify patient we will trial low dose Ambien 5mg at bedtime prn and follow up at our upcoming appointment on the 23rd. Order placed at [facility] pharmacy.    Nursing: please notify [facility] pharm [phone]   Cancel initial order ambien 5mg 1 tablet no refills   Correction Ambien 5mg 1 tab at bedtime prn quantity 30 refill 1    Thank you  CORNELIO Villasenor CNP

## 2022-02-10 NOTE — TELEPHONE ENCOUNTER
PA was denied. Please order alternative med with complete SIG or begin appeal process.     If you would like to appeal:   Create letter of medical necessity or    Compile supporting clinical documentation in EPIC Telephone encounter (TE).    Route TE to: [facility].

## 2022-02-11 ENCOUNTER — OFFICE VISIT (OUTPATIENT)
Dept: URGENT CARE | Facility: URGENT CARE | Age: 67
End: 2022-02-11
Payer: COMMERCIAL

## 2022-02-11 VITALS
TEMPERATURE: 97.8 F | SYSTOLIC BLOOD PRESSURE: 127 MMHG | WEIGHT: 184 LBS | HEART RATE: 70 BPM | DIASTOLIC BLOOD PRESSURE: 79 MMHG | BODY MASS INDEX: 27.98 KG/M2 | OXYGEN SATURATION: 99 %

## 2022-02-11 DIAGNOSIS — J01.10 ACUTE NON-RECURRENT FRONTAL SINUSITIS: Primary | ICD-10-CM

## 2022-02-11 PROCEDURE — 99213 OFFICE O/P EST LOW 20 MIN: CPT | Performed by: PHYSICIAN ASSISTANT

## 2022-02-11 RX ORDER — FLUTICASONE PROPIONATE 50 MCG
2 SPRAY, SUSPENSION (ML) NASAL DAILY
Qty: 16 G | Refills: 0 | Status: SHIPPED | OUTPATIENT
Start: 2022-02-11 | End: 2022-11-08

## 2022-02-11 NOTE — PROGRESS NOTES
"Assessment & Plan     1. Acute non-recurrent frontal sinusitis    - amoxicillin-clavulanate (AUGMENTIN) 875-125 MG tablet; Take 1 tablet by mouth 2 times daily for 7 days  Dispense: 14 tablet; Refill: 0  - fluticasone (FLONASE) 50 MCG/ACT nasal spray; Spray 2 sprays into both nostrils daily  Dispense: 16 g; Refill: 0    Follow up in PCP clinic if not improving over the next week.                 Gerardo Madrigal PA-C  [facility]     Ming Garnett is a 66 year old male who presents to clinic today for the following health issues:  Chief Complaint   Patient presents with     Urgent Care     Sinus Problem     c/o sinus infection for 3 weeks     HPI    Here today for nasal congestion. Frontal facial pressure. No fever. Cough at night. Less cough during the day. Post nasal drainage. History of sinus problems. \"but it has been years\". 3 weeks of symptoms. Recently boosted for COVID. Negative for COVID one week ago and negative. Taking nasal saline rinses.         Review of Systems        Objective    /79   Pulse 70   Temp 97.8  F (36.6  C) (Tympanic)   Wt 83.5 kg (184 lb)   SpO2 99%   BMI 27.98 kg/m    Physical Exam  Vitals and nursing note reviewed.   Constitutional:       General: He is not in acute distress.     Appearance: He is well-developed. He is not diaphoretic.   HENT:      Head: Normocephalic and atraumatic.      Right Ear: Tympanic membrane and external ear normal.      Left Ear: Tympanic membrane and external ear normal.      Nose: Congestion and rhinorrhea present.   Eyes:      Pupils: Pupils are equal, round, and reactive to light.   Pulmonary:      Effort: Pulmonary effort is normal. No respiratory distress.      Breath sounds: Normal breath sounds.   Musculoskeletal:      Cervical back: Normal range of motion and neck supple.   Lymphadenopathy:      Cervical: No cervical adenopathy.   Skin:     General: Skin is warm and dry.   Neurological:      " Mental Status: He is alert.      Cranial Nerves: No cranial nerve deficit.

## 2022-03-03 ENCOUNTER — LAB (OUTPATIENT)
Dept: LAB | Facility: CLINIC | Age: 67
End: 2022-03-03
Payer: COMMERCIAL

## 2022-03-03 PROCEDURE — 82274 ASSAY TEST FOR BLOOD FECAL: CPT | Performed by: NURSE PRACTITIONER

## 2022-03-05 LAB — HEMOCCULT STL QL IA: NEGATIVE

## 2022-05-16 ENCOUNTER — HEALTH MAINTENANCE LETTER (OUTPATIENT)
Age: 67
End: 2022-05-16

## 2022-06-29 DIAGNOSIS — F51.01 PRIMARY INSOMNIA: ICD-10-CM

## 2022-06-30 RX ORDER — ZOLPIDEM TARTRATE 5 MG/1
5 TABLET ORAL
Qty: 30 TABLET | Refills: 1 | Status: SHIPPED | OUTPATIENT
Start: 2022-06-30 | End: 2023-05-12

## 2022-06-30 NOTE — TELEPHONE ENCOUNTER
Routing refill request to provider for review/approval because:  Drug not on the [facility] refill protocol   Drug not active on patient's medication list    Last Written Prescription Date:  2/10/22  Last Fill Quantity: 30,  # refills: 1   Last office visit: 2/2/2022 with prescribing provider:  ODALYS Ochoa   Future Office Visit:   Next 5 appointments (look out 90 days)    Jul 19, 2022  9:30 AM  (Arrive by 9:10 AM)  Provider Visit with CORNELIO Villasenor CNP  [facility] ([facility] ) [address] [place] [address]  [phone]           LAKE Mcallister RN  [facility]

## 2022-07-19 ENCOUNTER — OFFICE VISIT (OUTPATIENT)
Dept: FAMILY MEDICINE | Facility: CLINIC | Age: 67
End: 2022-07-19
Payer: COMMERCIAL

## 2022-07-19 VITALS
SYSTOLIC BLOOD PRESSURE: 119 MMHG | HEART RATE: 61 BPM | TEMPERATURE: 98.1 F | RESPIRATION RATE: 15 BRPM | BODY MASS INDEX: 27.45 KG/M2 | WEIGHT: 181.12 LBS | OXYGEN SATURATION: 99 % | HEIGHT: 68 IN | DIASTOLIC BLOOD PRESSURE: 70 MMHG

## 2022-07-19 DIAGNOSIS — I10 HTN (HYPERTENSION), BENIGN: Primary | ICD-10-CM

## 2022-07-19 DIAGNOSIS — M79.18 MUSCLE PAIN, LUMBAR: ICD-10-CM

## 2022-07-19 DIAGNOSIS — Z12.11 SCREEN FOR COLON CANCER: ICD-10-CM

## 2022-07-19 PROCEDURE — 99214 OFFICE O/P EST MOD 30 MIN: CPT | Performed by: NURSE PRACTITIONER

## 2022-07-19 ASSESSMENT — PATIENT HEALTH QUESTIONNAIRE - PHQ9
10. IF YOU CHECKED OFF ANY PROBLEMS, HOW DIFFICULT HAVE THESE PROBLEMS MADE IT FOR YOU TO DO YOUR WORK, TAKE CARE OF THINGS AT HOME, OR GET ALONG WITH OTHER PEOPLE: NOT DIFFICULT AT ALL
SUM OF ALL RESPONSES TO PHQ QUESTIONS 1-9: 5
SUM OF ALL RESPONSES TO PHQ QUESTIONS 1-9: 5

## 2022-07-19 NOTE — PROGRESS NOTES
"  Assessment & Plan     Ming was seen today for recheck.    Diagnoses and all orders for this visit:    HTN (hypertension), benign  Stable; continue current regimen; renewed medication    Screen for colon cancer  Discussed options including FIT, Cologuard, colonoscopy  -     Colonscopy Screening  Referral; Future    Muscle pain, lumbar  -     Physical Therapy Referral; Future       BMI:   Estimated body mass index is 27.54 kg/m  as calculated from the following:    Height as of this encounter: 1.727 m (5' 8\").    Weight as of this encounter: 82.2 kg (181 lb 1.9 oz).       FUTURE APPOINTMENTS:       - Follow-up visit in 6-8 weeks    No follow-ups on file.    CORNELIO Villasenor [facility]      66 year old  year old male with [facility]   Patient Active Problem List   Diagnosis Code     HTN (hypertension), benign I10     Insomnia, unspecified type G47.00     Depression with anxiety F41.8     Hyperlipidemia LDL goal <130 E78.5     Mild major depression (H) F32.0     Stage 3a chronic kidney disease (H) N18.31     in clinic for acute office visit related to/establish care/to discuss       Subjective   Ming Garnett is a 66 year old presenting for the following health issues: RECHECK (Medication )    Last visit 2/2022 plan:  Benign essential hypertension  BP within goal <140/90; continue current regimen  Plan:  -     amLODIPine (NORVASC) 10 MG tablet; TAKE 1 TABLET EVERY DAY  -     atenolol (TENORMIN) 50 MG tablet; Take 1 tablet (50 mg) by mouth daily  -     hydrochlorothiazide (HYDRODIURIL) 25 MG tablet; TAKE 1 TABLET EVERY DAY  -     losartan (COZAAR) 100 MG tablet; TAKE 1 TABLET EVERY DAY     - c/o BLE cramping muscle aches occurring during the day; not interring with sleep; takes ibuprofen 400 mg once daily which helpsl; Lower back \"soreness\" Balance off with getting up then improves; denies dizziness; LH       History of Present Illness       Hypertension: He presents for follow " "up of hypertension.  He does check blood pressure  regularly outside of the clinic. Outpatient blood pressures have not been over 140/90. He does not follow a low salt diet.      Today's PHQ-9         PHQ-9 Total Score: 5    PHQ-9 Q9 Thoughts of better off dead/self-harm past 2 weeks :   Not at all    How difficult have these problems made it for you to do your work, take care of things at home, or get along with other people: Not difficult at all       additonal problems for provider to add (Optional):[id number]}    Review of Systems   Constitutional, HEENT, cardiovascular, pulmonary, gi and gu systems are negative, except as otherwise noted.      Objective    /70 (BP Location: Left arm, Patient Position: Sitting, Cuff Size: Adult Regular)   Pulse 61   Temp 98.1  F (36.7  C) (Temporal)   Resp 15   Ht 1.727 m (5' 8\")   Wt 82.2 kg (181 lb 1.9 oz)   SpO2 99%   BMI 27.54 kg/m    Body mass index is 27.54 kg/m .  Physical Exam   GENERAL: healthy, alert and no distress  NECK: no adenopathy, no asymmetry, masses, or scars and thyroid normal to palpation  RESP: lungs clear to auscultation - no rales, rhonchi or wheezes  CV: regular rate and rhythm, normal S1 S2, no S3 or S4, no murmur, click or rub, no peripheral edema and peripheral pulses strong  ABDOMEN: soft, nontender, no hepatosplenomegaly, no masses and bowel sounds normal  MS: no edema and peripheral pulses normal    No results found for any visits on 07/19/22.  No results found.                .  ..  "

## 2022-07-21 DIAGNOSIS — N40.0 BENIGN PROSTATIC HYPERPLASIA, UNSPECIFIED WHETHER LOWER URINARY TRACT SYMPTOMS PRESENT: Primary | ICD-10-CM

## 2022-07-22 ENCOUNTER — PATIENT OUTREACH (OUTPATIENT)
Dept: UROLOGY | Facility: CLINIC | Age: 67
End: 2022-07-22

## 2022-07-22 NOTE — TELEPHONE ENCOUNTER
Left message regarding scheduling surgery/procedure with Dr. Nicole.   Writer left call back number on the patients voicemail.      Nubia Flynn on 7/22/2022 at 1:43 PM   P: [phone]

## 2022-07-22 NOTE — TELEPHONE ENCOUNTER
FUTURE VISIT INFORMATION      SURGERY INFORMATION:    [facility] 7/27  RECORDS REQUESTED FROM:        Primary Care Provider: Nina Adorno MD  - [facility]    Pertinent Medical History: hypertension     Most recent EKG+ Tracing:    Most recent Sleep Study: 12/21/14

## 2022-07-22 NOTE — TELEPHONE ENCOUNTER
Pt phoned to offer a cancellation surgery spot he could be worked into for this coming Wednesday 7/27. Pt left detailed message requesting call back to discuss this or other dates which would work well for his holep.     SUSANNE Velazquez  Care Coordinator  [phone]

## 2022-07-26 ENCOUNTER — PRE VISIT (OUTPATIENT)
Dept: SURGERY | Facility: CLINIC | Age: 67
End: 2022-07-26

## 2022-09-11 ENCOUNTER — HEALTH MAINTENANCE LETTER (OUTPATIENT)
Age: 67
End: 2022-09-11

## 2022-10-04 ENCOUNTER — OFFICE VISIT (OUTPATIENT)
Dept: FAMILY MEDICINE | Facility: CLINIC | Age: 67
End: 2022-10-04
Payer: COMMERCIAL

## 2022-10-04 VITALS
TEMPERATURE: 98.2 F | WEIGHT: 178 LBS | SYSTOLIC BLOOD PRESSURE: 118 MMHG | RESPIRATION RATE: 16 BRPM | HEART RATE: 85 BPM | BODY MASS INDEX: 27.06 KG/M2 | DIASTOLIC BLOOD PRESSURE: 70 MMHG | OXYGEN SATURATION: 96 %

## 2022-10-04 DIAGNOSIS — J01.10 ACUTE FRONTAL SINUSITIS, RECURRENCE NOT SPECIFIED: Primary | ICD-10-CM

## 2022-10-04 PROCEDURE — 99213 OFFICE O/P EST LOW 20 MIN: CPT | Performed by: PHYSICIAN ASSISTANT

## 2022-10-04 RX ORDER — FLUTICASONE PROPIONATE 50 MCG
1 SPRAY, SUSPENSION (ML) NASAL DAILY
Qty: 16 G | Refills: 11 | Status: SHIPPED | OUTPATIENT
Start: 2022-10-04 | End: 2024-05-22

## 2022-10-04 RX ORDER — DOXYCYCLINE HYCLATE 100 MG
100 TABLET ORAL 2 TIMES DAILY
Qty: 20 TABLET | Refills: 0 | Status: SHIPPED | OUTPATIENT
Start: 2022-10-04 | End: 2022-10-14

## 2022-10-04 NOTE — PROGRESS NOTES
Assessment & Plan     Acute frontal sinusitis, recurrence not specified  Patient was seen with a 10-day history of URI symptoms including facial pain and pressure and change discharge and sputum.  Given duration of symptoms and history of tobacco use will cover with doxycycline.  This will also cover for possible bronchitis with secondary bacterial infection in his lungs due to his tobacco use history.  He may need to delay his surgery 1 week and tell symptoms are improved given general anesthesia with endotracheal intubation planned.  He will follow-up with his primary care provider for preop and further recommendations based on how he is feeling at that time.  Recommend nasal saline humidification and recommend Flonase as well.  May follow-up on an as-needed basis but should return for any fevers, chest pain, shortness of breath or difficulty breathing.-Holding off on any COVID-19 testing today as he is beyond the time of isolation, he is vitally normal, and he is outside the time.  For any treatment options. fluticasone (FLONASE) 50 MCG/ACT nasal spray  Dispense: 16 g; Refill: 11  - doxycycline hyclate (VIBRA-TABS) 100 MG tablet  Dispense: 20 tablet; Refill: 0    Carrie Mathis PA-C  [facility]    Subjective     Ming Garnett is a 66 year old male who presents to clinic today for the following health issues:  Chief Complaint   Patient presents with     Sinus Problem     Cough up with phlegm, ears pain, pressure on face x 10 days      HPI   Ming is a 66-year-old male with a history of tobacco use, depression, chronic kidney disease, hypertension, and hyperlipidemia.  He presents to urgent care with concerns regarding possible sinus infection.  Rhinorrhea, congestion, head pressure, facial pain and pressure.    Mucoid discharge.  Feels it drains down throat causing cough.     Cough sputum yellow in color.    No sob, chest pain, wheezing, HERNANDEZ.      No fevers at home.    Surgery planned  in 1 week, Oct 12th. Has preop in 2 days.  Wonders if he should delay.        Review of Systems  Constitutional, HEENT, cardiovascular, pulmonary, gi and gu systems are negative, except as otherwise noted.      Objective    /70 (BP Location: Right arm, Patient Position: Sitting, Cuff Size: Adult Regular)   Pulse 85   Temp 98.2  F (36.8  C) (Oral)   Resp 16   Wt 80.7 kg (178 lb)   SpO2 96%   BMI 27.06 kg/m    Physical Exam   Pt is in no acute distress and appears well  Ears patent B:  TM s intact, non-injected. All land marks easily visibile    Nasal mucosa is edematous, mucoid discharge., TTP of the maxillary and ethmoid sinuses, non-tender frontal.      Pharynx: non erythematous, tonsils non hypertrophied, No exudate   Neck supple: no adenopathy  Lungs: Scattered rhonchi, no wheezes or rales.  Clears post tussively .   Heart: RRR, no murmur, no thrills or heaves   Ext: no edema  Skin: no rashes

## 2022-10-06 ENCOUNTER — PATIENT OUTREACH (OUTPATIENT)
Dept: UROLOGY | Facility: CLINIC | Age: 67
End: 2022-10-06

## 2022-10-06 NOTE — TELEPHONE ENCOUNTER
RNCC call to patient to discuss upcoming surgery.    Pt states he has a sinus infection and some degree of bronchitis. He was recently in UC for this and was prescribed abx. He informs nurse on phone call he wishes to reschedule surgery, pre-op and post op appts.    RNCC cancelled appts, surgery scheduler notified and pt states understanding we will reach him with a next available reschedule date when able

## 2022-10-31 ENCOUNTER — PATIENT OUTREACH (OUTPATIENT)
Dept: UROLOGY | Facility: CLINIC | Age: 67
End: 2022-10-31

## 2022-11-02 ENCOUNTER — TELEPHONE (OUTPATIENT)
Dept: UROLOGY | Facility: CLINIC | Age: 67
End: 2022-11-02

## 2022-11-02 DIAGNOSIS — N40.0 BENIGN PROSTATIC HYPERPLASIA, UNSPECIFIED WHETHER LOWER URINARY TRACT SYMPTOMS PRESENT: Primary | ICD-10-CM

## 2022-11-02 NOTE — TELEPHONE ENCOUNTER
Called patient to schedule surgery with Dr. Nicole    Date of Surgery: 11/23/22    Location of surgery: [facility] [place] [facility]    Pre-Op H&P: Patient to call to schedule within the [facility] system    Pre/Post Imaging:  Not Applicable    Discussed COVID-19 Testing: Yes, patient will call to schedule    Post-Op Appt Date: 3/10/23 in person for flow/PVR    Surgery Packet Mailed: My Chart      Patient is having a HoLEP   Patient has a responsible adult to drive home and stay with them for 24 hours: Yes  Pre-op physical completed: Patient to call to schedule within the [facility] system  PAC: No  Bowel Prep: Yes, No  UA/UC completed: Advised patient to leave urine sample at Pre op H&P  COVID test completed: Advised about patient needing PCR, patient stated he will call to schedule      Allison Fall LPN  11/02/22  1:43 PM

## 2022-11-08 ENCOUNTER — OFFICE VISIT (OUTPATIENT)
Dept: FAMILY MEDICINE | Facility: CLINIC | Age: 67
End: 2022-11-08
Payer: COMMERCIAL

## 2022-11-08 VITALS
HEART RATE: 67 BPM | DIASTOLIC BLOOD PRESSURE: 79 MMHG | RESPIRATION RATE: 14 BRPM | WEIGHT: 182 LBS | TEMPERATURE: 98 F | HEIGHT: 68 IN | OXYGEN SATURATION: 96 % | SYSTOLIC BLOOD PRESSURE: 130 MMHG | BODY MASS INDEX: 27.58 KG/M2

## 2022-11-08 DIAGNOSIS — N40.1 BENIGN PROSTATIC HYPERPLASIA WITH NOCTURIA: ICD-10-CM

## 2022-11-08 DIAGNOSIS — Z01.818 PREOP GENERAL PHYSICAL EXAM: Primary | ICD-10-CM

## 2022-11-08 DIAGNOSIS — R35.1 BENIGN PROSTATIC HYPERPLASIA WITH NOCTURIA: ICD-10-CM

## 2022-11-08 DIAGNOSIS — E78.5 HYPERLIPIDEMIA LDL GOAL <130: ICD-10-CM

## 2022-11-08 DIAGNOSIS — F17.210 CIGARETTE SMOKER: ICD-10-CM

## 2022-11-08 DIAGNOSIS — R80.9 MICROALBUMINURIA: ICD-10-CM

## 2022-11-08 DIAGNOSIS — Z12.11 SCREEN FOR COLON CANCER: ICD-10-CM

## 2022-11-08 DIAGNOSIS — F32.0 MILD MAJOR DEPRESSION (H): ICD-10-CM

## 2022-11-08 DIAGNOSIS — R80.9 ALBUMINURIA: ICD-10-CM

## 2022-11-08 DIAGNOSIS — I10 HTN (HYPERTENSION), BENIGN: ICD-10-CM

## 2022-11-08 DIAGNOSIS — R73.09 ELEVATED GLUCOSE: ICD-10-CM

## 2022-11-08 DIAGNOSIS — N18.31 STAGE 3A CHRONIC KIDNEY DISEASE (H): ICD-10-CM

## 2022-11-08 DIAGNOSIS — F41.8 DEPRESSION WITH ANXIETY: ICD-10-CM

## 2022-11-08 LAB
CREAT SERPL-MCNC: 1.36 MG/DL (ref 0.67–1.17)
CREAT UR-MCNC: 138 MG/DL
ERYTHROCYTE [DISTWIDTH] IN BLOOD BY AUTOMATED COUNT: 12.6 % (ref 10–15)
GFR SERPL CREATININE-BSD FRML MDRD: 57 ML/MIN/1.73M2
HBA1C MFR BLD: 5.5 % (ref 0–5.6)
HCT VFR BLD AUTO: 43.7 % (ref 40–53)
HGB BLD-MCNC: 15.2 G/DL (ref 13.3–17.7)
MCH RBC QN AUTO: 32.5 PG (ref 26.5–33)
MCHC RBC AUTO-ENTMCNC: 34.8 G/DL (ref 31.5–36.5)
MCV RBC AUTO: 93 FL (ref 78–100)
MICROALBUMIN UR-MCNC: 472 MG/L
MICROALBUMIN/CREAT UR: 342.03 MG/G CR (ref 0–17)
PLATELET # BLD AUTO: 278 10E3/UL (ref 150–450)
POTASSIUM SERPL-SCNC: 4.2 MMOL/L (ref 3.4–5.3)
RBC # BLD AUTO: 4.68 10E6/UL (ref 4.4–5.9)
SODIUM SERPL-SCNC: 139 MMOL/L (ref 136–145)
WBC # BLD AUTO: 10.1 10E3/UL (ref 4–11)

## 2022-11-08 PROCEDURE — G0008 ADMIN INFLUENZA VIRUS VAC: HCPCS | Performed by: NURSE PRACTITIONER

## 2022-11-08 PROCEDURE — 91312 COVID-19,PF,PFIZER BOOSTER BIVALENT: CPT | Performed by: NURSE PRACTITIONER

## 2022-11-08 PROCEDURE — 90662 IIV NO PRSV INCREASED AG IM: CPT | Performed by: NURSE PRACTITIONER

## 2022-11-08 PROCEDURE — 85027 COMPLETE CBC AUTOMATED: CPT | Performed by: NURSE PRACTITIONER

## 2022-11-08 PROCEDURE — 82043 UR ALBUMIN QUANTITATIVE: CPT | Performed by: NURSE PRACTITIONER

## 2022-11-08 PROCEDURE — 93005 ELECTROCARDIOGRAM TRACING: CPT | Performed by: NURSE PRACTITIONER

## 2022-11-08 PROCEDURE — 84132 ASSAY OF SERUM POTASSIUM: CPT | Performed by: NURSE PRACTITIONER

## 2022-11-08 PROCEDURE — 36415 COLL VENOUS BLD VENIPUNCTURE: CPT | Performed by: NURSE PRACTITIONER

## 2022-11-08 PROCEDURE — 99214 OFFICE O/P EST MOD 30 MIN: CPT | Mod: 25 | Performed by: NURSE PRACTITIONER

## 2022-11-08 PROCEDURE — 82565 ASSAY OF CREATININE: CPT | Performed by: NURSE PRACTITIONER

## 2022-11-08 PROCEDURE — 0124A COVID-19,PF,PFIZER BOOSTER BIVALENT: CPT | Performed by: NURSE PRACTITIONER

## 2022-11-08 PROCEDURE — 83036 HEMOGLOBIN GLYCOSYLATED A1C: CPT | Performed by: NURSE PRACTITIONER

## 2022-11-08 PROCEDURE — 93010 ELECTROCARDIOGRAM REPORT: CPT | Performed by: INTERNAL MEDICINE

## 2022-11-08 PROCEDURE — 84295 ASSAY OF SERUM SODIUM: CPT | Performed by: NURSE PRACTITIONER

## 2022-11-08 NOTE — PATIENT INSTRUCTIONS
Please take your Atenolol and Norvasc in the AM of procedure.     Do not take any other medications the AM of procedure. Stop all NSAID's, ASA, and vitamins five days prior to surgery

## 2022-11-08 NOTE — PROGRESS NOTES
[facility]  [address] [place] [address]  Phone: [phone]  Fax: [phone]  Primary Provider: Cong Ochoa  Pre-op Performing Provider: MIAH TIWARI    PREOPERATIVE EVALUATION:  Today's date: 11/8/2022    Ming Garnett is a 66 year old male who presents for a preoperative evaluation.    Surgical Information:  Surgery/Procedure: Enucleation, prostate Using hommium laser  Surgery Location: [facility]  Surgeon: Dr. Nicole  Surgery Date: 11/23/2022  Time of Surgery: Pending  Where patient plans to recover: At home with family  Fax number for surgical facility: Pending    Type of Anesthesia Anticipated: General and to be determined    Assessment & Plan     The proposed surgical procedure is considered INTERMEDIATE risk.    Preop general physical exam    - CBC with platelets  - Sodium  - Potassium  - Creatinine  - EKG 12-lead, tracing only  - CBC with platelets  - Sodium  - Potassium  - Creatinine    Stage 3a chronic kidney disease (H)    - Albumin Random Urine Quantitative with Creat Ratio  - Albumin Random Urine Quantitative with Creat Ratio    Screen for colon cancer    - Colonoscopy Screening  Referral    HTN (hypertension), benign  - vitals stable today.     Hyperlipidemia LDL goal <130      Cigarette smoker  - smoking cessation discussed.     Albuminuria  - hgba1c added and stable.   - no other urines to compare it to. On ARB. I will have him see Nephrology for further evaluation.         Possible Sleep Apnea: denied snoring anymore.  {     Risks and Recommendations:  The patient has the following additional risks and recommendations for perioperative complications:   - No identified additional risk factors other than previously addressed    Medication Instructions:  Please take your Atenolol and Norvasc in the AM of procedure.     Do not take any other medications the AM of procedure. Stop all NSAID's, ASA, and  vitamins five days prior to surgery    RECOMMENDATION:  APPROVAL GIVEN to proceed with proposed procedure, without further diagnostic evaluation.    Review of external notes as documented above     Subjective     HPI related to upcoming procedure: increased problems with voiding at night    Preop Questions 11/8/2022   1. Have you ever had a heart attack or stroke? No   2. Have you ever had surgery on your heart or blood vessels, such as a stent placement, a coronary artery bypass, or surgery on an artery in your head, neck, heart, or legs? No   3. Do you have chest pain with activity? No   4. Do you have a history of  heart failure? No   5. Do you currently have a cold, bronchitis or symptoms of other infection? No   6. Do you have a cough, shortness of breath, or wheezing? No   7. Do you or anyone in your family have previous history of blood clots? No   8. Do you or does anyone in your family have a serious bleeding problem such as prolonged bleeding following surgeries or cuts? No   9. Have you ever had problems with anemia or been told to take iron pills? No   10. Have you had any abnormal blood loss such as black, tarry or bloody stools? No   11. Have you ever had a blood transfusion? No   12. Are you willing to have a blood transfusion if it is medically needed before, during, or after your surgery? Yes   13. Have you or any of your relatives ever had problems with anesthesia? No   14. Do you have sleep apnea, excessive snoring or daytime drowsiness? No- stated many years ago he was dx with mild sleep apnea. But, now, with weight changes, he no longer is snoring, no gasping in middle of the night, or witness apnea by spouse   15. Do you have any artifical heart valves or other implanted medical devices like a pacemaker, defibrillator, or continuous glucose monitor? No   16. Do you have artificial joints? No   17. Are you allergic to latex? No         Status of Chronic Conditions:  See problem list for active  medical problems.  Problems all longstanding and stable, except as noted/documented.  See ROS for pertinent symptoms related to these conditions.      Review of Systems  Constitutional, neuro, ENT, endocrine, pulmonary, cardiac, gastrointestinal, genitourinary, musculoskeletal, integument and psychiatric systems are negative, except as otherwise noted.    Patient Active Problem List    Diagnosis Date Noted     Mild major depression (H) 02/07/2022     Priority: Medium     Stage 3a chronic kidney disease (H) 02/07/2022     Priority: Medium     Hyperlipidemia LDL goal <130 09/15/2020     Priority: Medium     Insomnia, unspecified type 09/02/2016     Priority: Medium     HTN (hypertension), benign 12/15/2009     Priority: Medium     Depression with anxiety 12/15/2009     Priority: Medium      Past Medical History:   Diagnosis Date     Hearing problem      Hypertension      Sensorineural hearing loss      Sleep apnea      No past surgical history on file.  Current Outpatient Medications   Medication Sig Dispense Refill     amLODIPine (NORVASC) 10 MG tablet TAKE 1 TABLET EVERY DAY 90 tablet 3     atenolol (TENORMIN) 50 MG tablet Take 1 tablet (50 mg) by mouth daily 90 tablet 3     atorvastatin (LIPITOR) 40 MG tablet Take 1 tablet (40 mg) by mouth daily 90 tablet 3     clotrimazole (LOTRIMIN) 1 % external cream Apply to affected area BID X 1 month supply 2 g 1     fluticasone (FLONASE) 50 MCG/ACT nasal spray Spray 1 spray in nostril daily 16 g 11     hydrochlorothiazide (HYDRODIURIL) 25 MG tablet TAKE 1 TABLET EVERY DAY 90 tablet 3     losartan (COZAAR) 100 MG tablet TAKE 1 TABLET EVERY DAY 90 tablet 3     nicotine (NICODERM CQ) 21 MG/24HR 24 hr patch Place 1 patch onto the skin every 24 hours 28 patch 2     sertraline (ZOLOFT) 100 MG tablet Take 1 tablet (100 mg) by mouth daily 90 tablet 3     triamcinolone (KENALOG) 0.1 % external ointment APPLY 1 APPLICATION TWICE A DAY AS NEEDED TO BUTTOCK AREA 80 g 3     zolpidem  "(AMBIEN) 5 MG tablet Take 1 tablet (5 mg) by mouth nightly as needed for sleep Take tablet by mouth 15 minutes prior to sleep, for Sleep Study 30 tablet 1       No Known Allergies     Social History     Tobacco Use     Smoking status: Every Day     Packs/day: 0.50     Years: 1.00     Pack years: 0.50     Types: Cigarettes, Cigars     Start date: 2000     Last attempt to quit: 2019     Years since quittin.9     Smokeless tobacco: Never   Substance Use Topics     Alcohol use: Yes     Alcohol/week: 0.0 standard drinks     Comment: a few beers per day      Family History   Problem Relation Age of Onset     Breast Cancer Mother      Hypertension Mother      Other Cancer Father      Aneurysm Father      Hypertension Father      History   Drug Use Unknown         Objective     /79   Pulse 67   Temp 98  F (36.7  C) (Oral)   Resp 14   Ht 1.727 m (5' 8\")   Wt 82.6 kg (182 lb)   SpO2 96%   BMI 27.67 kg/m      Physical Exam    GENERAL APPEARANCE: healthy, alert and no distress     EYES: EOMI,  PERRL     HENT: ear canals and TM's normal and nose and mouth without ulcers or lesions     NECK: no adenopathy, no asymmetry, masses, or scars and thyroid normal to palpation     RESP: lungs clear to auscultation - no rales, rhonchi or wheezes     CV: regular rates and rhythm, normal S1 S2, no S3 or S4 and no murmur, click or rub     ABDOMEN:  soft, nontender, no HSM or masses and bowel sounds normal     MS: extremities normal- no gross deformities noted, no evidence of inflammation in joints, FROM in all extremities.     SKIN: no suspicious lesions or rashes     NEURO: Normal strength and tone, sensory exam grossly normal, mentation intact and speech normal     PSYCH: mentation appears normal. and affect normal/bright     LYMPHATICS: No cervical adenopathy    Recent Labs   Lab Test 22  1042   HGB 15.1         POTASSIUM 3.6   CR 1.26*   A1C 5.7*        Diagnostics:  Recent Results (from the " past 48 hour(s))   EKG 12-lead, tracing only    Collection Time: 11/08/22  7:49 AM   Result Value Ref Range    Systolic Blood Pressure  mmHg    Diastolic Blood Pressure  mmHg    Ventricular Rate 60 BPM    Atrial Rate 60 BPM    MO Interval 170 ms    QRS Duration 96 ms     ms    QTc 436 ms    P Axis 93 degrees    R AXIS 65 degrees    T Axis 58 degrees    Interpretation ECG       Sinus rhythm  Normal ECG  No previous ECGs available     CBC with platelets    Collection Time: 11/08/22  8:12 AM   Result Value Ref Range    WBC Count 10.1 4.0 - 11.0 10e3/uL    RBC Count 4.68 4.40 - 5.90 10e6/uL    Hemoglobin 15.2 13.3 - 17.7 g/dL    Hematocrit 43.7 40.0 - 53.0 %    MCV 93 78 - 100 fL    MCH 32.5 26.5 - 33.0 pg    MCHC 34.8 31.5 - 36.5 g/dL    RDW 12.6 10.0 - 15.0 %    Platelet Count 278 150 - 450 10e3/uL   Sodium    Collection Time: 11/08/22  8:12 AM   Result Value Ref Range    Sodium 139 136 - 145 mmol/L   Potassium    Collection Time: 11/08/22  8:12 AM   Result Value Ref Range    Potassium 4.2 3.4 - 5.3 mmol/L      EKG required for smoking and HTN and not completed in the last 90 days.   11-8-2022  Sinus rhythm   Normal ECG   No previous ECGs available - JN     Revised Cardiac Risk Index (RCRI):  The patient has the following serious cardiovascular risks for perioperative complications:   - No serious cardiac risks = 0 points            Signed Electronically by: CORNELIO Lima CNP  Copy of this evaluation report is provided to requesting physician.    {

## 2022-11-11 LAB
ATRIAL RATE - MUSE: 60 BPM
DIASTOLIC BLOOD PRESSURE - MUSE: NORMAL MMHG
INTERPRETATION ECG - MUSE: NORMAL
P AXIS - MUSE: 93 DEGREES
PR INTERVAL - MUSE: 170 MS
QRS DURATION - MUSE: 96 MS
QT - MUSE: 436 MS
QTC - MUSE: 436 MS
R AXIS - MUSE: 65 DEGREES
SYSTOLIC BLOOD PRESSURE - MUSE: NORMAL MMHG
T AXIS - MUSE: 58 DEGREES
VENTRICULAR RATE- MUSE: 60 BPM

## 2022-11-15 PROBLEM — R35.1 BENIGN PROSTATIC HYPERPLASIA WITH NOCTURIA: Status: ACTIVE | Noted: 2022-11-15

## 2022-11-15 PROBLEM — R80.9 ALBUMINURIA: Status: ACTIVE | Noted: 2022-11-15

## 2022-11-15 PROBLEM — N40.1 BENIGN PROSTATIC HYPERPLASIA WITH NOCTURIA: Status: ACTIVE | Noted: 2022-11-15

## 2022-11-15 NOTE — RESULT ENCOUNTER NOTE
CORIE Lai    I tried to reach you by phone call but you did not answer.  Sorry what that but I will leave a result note via your MyChart and if you have any questions please feel free to call the nurse line, or message me back via Hubbub.     Your hemoglobin A1c is 5.5, not indicating diabetes type 2 or prediabetes.     Your kidney function is just below normal.  Your urine test is showing a large amount of albumin in your urine.  This is indicating that your kidneys are being affected by possibly hypertension, smoking, or some other etiologies.  I would like you to follow-up with a nephrologist so he can further evaluate if there is other causes and ways to improve it.  One way I know for sure is to stop smoking.  If you at all need any further assistance with this please let me know.     It was a pleasure meeting you.  Like I said prior if you have any questions please let me know.  The appointment center should be contacting you within the week to schedule that nephrology appointment.     Precious

## 2022-11-16 ENCOUNTER — TELEPHONE (OUTPATIENT)
Dept: UROLOGY | Facility: CLINIC | Age: 67
End: 2022-11-16

## 2022-11-16 NOTE — TELEPHONE ENCOUNTER
Pt called, Pt states he will do a Urine test tomorrow 11/17/22.    Keke Oh, [facility]  11/16/22  2:23 PM

## 2022-11-17 ENCOUNTER — LAB (OUTPATIENT)
Dept: LAB | Facility: CLINIC | Age: 67
End: 2022-11-17
Attending: UROLOGY
Payer: COMMERCIAL

## 2022-11-17 DIAGNOSIS — N40.0 BENIGN PROSTATIC HYPERPLASIA, UNSPECIFIED WHETHER LOWER URINARY TRACT SYMPTOMS PRESENT: ICD-10-CM

## 2022-11-17 PROCEDURE — 87086 URINE CULTURE/COLONY COUNT: CPT

## 2022-11-18 LAB — BACTERIA UR CULT: NO GROWTH

## 2022-11-21 ENCOUNTER — LAB (OUTPATIENT)
Dept: LAB | Facility: CLINIC | Age: 67
End: 2022-11-21
Attending: FAMILY MEDICINE
Payer: COMMERCIAL

## 2022-11-21 DIAGNOSIS — Z01.818 PRE-OPERATIVE GENERAL PHYSICAL EXAMINATION: ICD-10-CM

## 2022-11-21 LAB — SARS-COV-2 RNA RESP QL NAA+PROBE: NEGATIVE

## 2022-11-21 PROCEDURE — U0005 INFEC AGEN DETEC AMPLI PROBE: HCPCS

## 2022-11-21 PROCEDURE — U0003 INFECTIOUS AGENT DETECTION BY NUCLEIC ACID (DNA OR RNA); SEVERE ACUTE RESPIRATORY SYNDROME CORONAVIRUS 2 (SARS-COV-2) (CORONAVIRUS DISEASE [COVID-19]), AMPLIFIED PROBE TECHNIQUE, MAKING USE OF HIGH THROUGHPUT TECHNOLOGIES AS DESCRIBED BY CMS-2020-01-R: HCPCS

## 2022-11-23 ENCOUNTER — HOSPITAL ENCOUNTER (OUTPATIENT)
Facility: CLINIC | Age: 67
Discharge: HOME | End: 2022-11-23
Attending: UROLOGY | Admitting: UROLOGY
Payer: COMMERCIAL

## 2022-11-23 ENCOUNTER — ANESTHESIA (OUTPATIENT)
Dept: SURGERY | Facility: CLINIC | Age: 67
End: 2022-11-23
Payer: COMMERCIAL

## 2022-11-23 ENCOUNTER — ANESTHESIA EVENT (OUTPATIENT)
Dept: SURGERY | Facility: CLINIC | Age: 67
End: 2022-11-23
Payer: COMMERCIAL

## 2022-11-23 VITALS
RESPIRATION RATE: 12 BRPM | DIASTOLIC BLOOD PRESSURE: 99 MMHG | HEART RATE: 75 BPM | WEIGHT: 179.01 LBS | HEIGHT: 68 IN | OXYGEN SATURATION: 98 % | TEMPERATURE: 98.1 F | BODY MASS INDEX: 27.13 KG/M2 | SYSTOLIC BLOOD PRESSURE: 127 MMHG

## 2022-11-23 DIAGNOSIS — N40.1 BENIGN PROSTATIC HYPERPLASIA WITH NOCTURIA: Primary | ICD-10-CM

## 2022-11-23 DIAGNOSIS — R35.1 BENIGN PROSTATIC HYPERPLASIA WITH NOCTURIA: Primary | ICD-10-CM

## 2022-11-23 LAB
CREAT SERPL-MCNC: 1.23 MG/DL (ref 0.66–1.25)
GFR SERPL CREATININE-BSD FRML MDRD: 64 ML/MIN/1.73M2
GLUCOSE BLDC GLUCOMTR-MCNC: 95 MG/DL (ref 70–99)
HGB BLD-MCNC: 14.7 G/DL (ref 13.3–17.7)
POTASSIUM BLD-SCNC: 3.5 MMOL/L (ref 3.4–5.3)

## 2022-11-23 PROCEDURE — 250N000011 HC RX IP 250 OP 636: Performed by: ANESTHESIOLOGY

## 2022-11-23 PROCEDURE — 84132 ASSAY OF SERUM POTASSIUM: CPT | Performed by: ANESTHESIOLOGY

## 2022-11-23 PROCEDURE — 258N000003 HC RX IP 258 OP 636: Performed by: NURSE ANESTHETIST, CERTIFIED REGISTERED

## 2022-11-23 PROCEDURE — 82565 ASSAY OF CREATININE: CPT | Performed by: ANESTHESIOLOGY

## 2022-11-23 PROCEDURE — 250N000011 HC RX IP 250 OP 636: Performed by: UROLOGY

## 2022-11-23 PROCEDURE — 360N000077 HC SURGERY LEVEL 4, PER MIN: Performed by: UROLOGY

## 2022-11-23 PROCEDURE — C1758 CATHETER, URETERAL: HCPCS | Performed by: UROLOGY

## 2022-11-23 PROCEDURE — 250N000025 HC SEVOFLURANE, PER MIN: Performed by: UROLOGY

## 2022-11-23 PROCEDURE — 250N000009 HC RX 250: Performed by: NURSE ANESTHETIST, CERTIFIED REGISTERED

## 2022-11-23 PROCEDURE — 370N000017 HC ANESTHESIA TECHNICAL FEE, PER MIN: Performed by: UROLOGY

## 2022-11-23 PROCEDURE — 250N000011 HC RX IP 250 OP 636: Performed by: NURSE ANESTHETIST, CERTIFIED REGISTERED

## 2022-11-23 PROCEDURE — 258N000003 HC RX IP 258 OP 636: Performed by: ANESTHESIOLOGY

## 2022-11-23 PROCEDURE — 272N000001 HC OR GENERAL SUPPLY STERILE: Performed by: UROLOGY

## 2022-11-23 PROCEDURE — 85018 HEMOGLOBIN: CPT | Performed by: ANESTHESIOLOGY

## 2022-11-23 PROCEDURE — 258N000003 HC RX IP 258 OP 636: Performed by: UROLOGY

## 2022-11-23 PROCEDURE — 52649 PROSTATE LASER ENUCLEATION: CPT | Mod: GC | Performed by: UROLOGY

## 2022-11-23 PROCEDURE — 710N000012 HC RECOVERY PHASE 2, PER MINUTE: Performed by: UROLOGY

## 2022-11-23 PROCEDURE — 710N000010 HC RECOVERY PHASE 1, LEVEL 2, PER MIN: Performed by: UROLOGY

## 2022-11-23 PROCEDURE — 88305 TISSUE EXAM BY PATHOLOGIST: CPT | Mod: TC | Performed by: UROLOGY

## 2022-11-23 PROCEDURE — 999N000141 HC STATISTIC PRE-PROCEDURE NURSING ASSESSMENT: Performed by: UROLOGY

## 2022-11-23 RX ORDER — LIDOCAINE 40 MG/G
CREAM TOPICAL
Status: DISCONTINUED | OUTPATIENT
Start: 2022-11-23 | End: 2022-11-23 | Stop reason: HOSPADM

## 2022-11-23 RX ORDER — EPHEDRINE SULFATE 50 MG/ML
INJECTION, SOLUTION INTRAMUSCULAR; INTRAVENOUS; SUBCUTANEOUS PRN
Status: DISCONTINUED | OUTPATIENT
Start: 2022-11-23 | End: 2022-11-23

## 2022-11-23 RX ORDER — FUROSEMIDE 10 MG/ML
INJECTION INTRAMUSCULAR; INTRAVENOUS PRN
Status: DISCONTINUED | OUTPATIENT
Start: 2022-11-23 | End: 2022-11-23

## 2022-11-23 RX ORDER — GLYCOPYRROLATE 0.2 MG/ML
INJECTION, SOLUTION INTRAMUSCULAR; INTRAVENOUS PRN
Status: DISCONTINUED | OUTPATIENT
Start: 2022-11-23 | End: 2022-11-23

## 2022-11-23 RX ORDER — NITROFURANTOIN 25; 75 MG/1; MG/1
100 CAPSULE ORAL 2 TIMES DAILY
Qty: 14 CAPSULE | Refills: 0 | Status: SHIPPED | OUTPATIENT
Start: 2022-11-23 | End: 2022-12-26

## 2022-11-23 RX ORDER — HYDROMORPHONE HYDROCHLORIDE 1 MG/ML
0.4 INJECTION, SOLUTION INTRAMUSCULAR; INTRAVENOUS; SUBCUTANEOUS EVERY 5 MIN PRN
Status: DISCONTINUED | OUTPATIENT
Start: 2022-11-23 | End: 2022-11-23 | Stop reason: HOSPADM

## 2022-11-23 RX ORDER — FENTANYL CITRATE 50 UG/ML
25 INJECTION, SOLUTION INTRAMUSCULAR; INTRAVENOUS EVERY 5 MIN PRN
Status: DISCONTINUED | OUTPATIENT
Start: 2022-11-23 | End: 2022-11-23 | Stop reason: HOSPADM

## 2022-11-23 RX ORDER — AMPICILLIN 2 G/1
2 INJECTION, POWDER, FOR SOLUTION INTRAVENOUS ONCE
Status: COMPLETED | OUTPATIENT
Start: 2022-11-23 | End: 2022-11-23

## 2022-11-23 RX ORDER — SODIUM CHLORIDE, SODIUM LACTATE, POTASSIUM CHLORIDE, CALCIUM CHLORIDE 600; 310; 30; 20 MG/100ML; MG/100ML; MG/100ML; MG/100ML
INJECTION, SOLUTION INTRAVENOUS CONTINUOUS PRN
Status: DISCONTINUED | OUTPATIENT
Start: 2022-11-23 | End: 2022-11-23

## 2022-11-23 RX ORDER — FENTANYL CITRATE 50 UG/ML
50 INJECTION, SOLUTION INTRAMUSCULAR; INTRAVENOUS EVERY 5 MIN PRN
Status: DISCONTINUED | OUTPATIENT
Start: 2022-11-23 | End: 2022-11-23 | Stop reason: HOSPADM

## 2022-11-23 RX ORDER — FENTANYL CITRATE 50 UG/ML
INJECTION, SOLUTION INTRAMUSCULAR; INTRAVENOUS PRN
Status: DISCONTINUED | OUTPATIENT
Start: 2022-11-23 | End: 2022-11-23

## 2022-11-23 RX ORDER — ONDANSETRON 4 MG/1
4 TABLET, ORALLY DISINTEGRATING ORAL EVERY 30 MIN PRN
Status: DISCONTINUED | OUTPATIENT
Start: 2022-11-23 | End: 2022-11-23 | Stop reason: HOSPADM

## 2022-11-23 RX ORDER — DIMENHYDRINATE 50 MG/ML
25 INJECTION, SOLUTION INTRAMUSCULAR; INTRAVENOUS
Status: DISCONTINUED | OUTPATIENT
Start: 2022-11-23 | End: 2022-11-23 | Stop reason: HOSPADM

## 2022-11-23 RX ORDER — LABETALOL HYDROCHLORIDE 5 MG/ML
10 INJECTION, SOLUTION INTRAVENOUS
Status: DISCONTINUED | OUTPATIENT
Start: 2022-11-23 | End: 2022-11-23 | Stop reason: HOSPADM

## 2022-11-23 RX ORDER — ACETAMINOPHEN 325 MG/1
975 TABLET ORAL ONCE
Status: DISCONTINUED | OUTPATIENT
Start: 2022-11-23 | End: 2022-11-23 | Stop reason: HOSPADM

## 2022-11-23 RX ORDER — ONDANSETRON 2 MG/ML
4 INJECTION INTRAMUSCULAR; INTRAVENOUS EVERY 30 MIN PRN
Status: DISCONTINUED | OUTPATIENT
Start: 2022-11-23 | End: 2022-11-23 | Stop reason: HOSPADM

## 2022-11-23 RX ORDER — ONDANSETRON 2 MG/ML
INJECTION INTRAMUSCULAR; INTRAVENOUS PRN
Status: DISCONTINUED | OUTPATIENT
Start: 2022-11-23 | End: 2022-11-23

## 2022-11-23 RX ORDER — LIDOCAINE HYDROCHLORIDE 20 MG/ML
INJECTION, SOLUTION INFILTRATION; PERINEURAL PRN
Status: DISCONTINUED | OUTPATIENT
Start: 2022-11-23 | End: 2022-11-23

## 2022-11-23 RX ORDER — SODIUM CHLORIDE, SODIUM LACTATE, POTASSIUM CHLORIDE, CALCIUM CHLORIDE 600; 310; 30; 20 MG/100ML; MG/100ML; MG/100ML; MG/100ML
INJECTION, SOLUTION INTRAVENOUS CONTINUOUS
Status: DISCONTINUED | OUTPATIENT
Start: 2022-11-23 | End: 2022-11-23 | Stop reason: HOSPADM

## 2022-11-23 RX ORDER — PROPOFOL 10 MG/ML
INJECTION, EMULSION INTRAVENOUS PRN
Status: DISCONTINUED | OUTPATIENT
Start: 2022-11-23 | End: 2022-11-23

## 2022-11-23 RX ORDER — FENTANYL CITRATE 50 UG/ML
25 INJECTION, SOLUTION INTRAMUSCULAR; INTRAVENOUS
Status: DISCONTINUED | OUTPATIENT
Start: 2022-11-23 | End: 2022-11-23 | Stop reason: HOSPADM

## 2022-11-23 RX ORDER — DEXAMETHASONE SODIUM PHOSPHATE 4 MG/ML
INJECTION, SOLUTION INTRA-ARTICULAR; INTRALESIONAL; INTRAMUSCULAR; INTRAVENOUS; SOFT TISSUE PRN
Status: DISCONTINUED | OUTPATIENT
Start: 2022-11-23 | End: 2022-11-23

## 2022-11-23 RX ORDER — HYDROMORPHONE HYDROCHLORIDE 1 MG/ML
0.2 INJECTION, SOLUTION INTRAMUSCULAR; INTRAVENOUS; SUBCUTANEOUS EVERY 5 MIN PRN
Status: DISCONTINUED | OUTPATIENT
Start: 2022-11-23 | End: 2022-11-23 | Stop reason: HOSPADM

## 2022-11-23 RX ADMIN — FENTANYL CITRATE 50 MCG: 50 INJECTION, SOLUTION INTRAMUSCULAR; INTRAVENOUS at 11:18

## 2022-11-23 RX ADMIN — GENTAMICIN SULFATE 400 MG: 40 INJECTION, SOLUTION INTRAMUSCULAR; INTRAVENOUS at 10:54

## 2022-11-23 RX ADMIN — FENTANYL CITRATE 50 MCG: 50 INJECTION, SOLUTION INTRAMUSCULAR; INTRAVENOUS at 10:55

## 2022-11-23 RX ADMIN — Medication 5 MG: at 12:14

## 2022-11-23 RX ADMIN — FENTANYL CITRATE 25 MCG: 50 INJECTION, SOLUTION INTRAMUSCULAR; INTRAVENOUS at 12:15

## 2022-11-23 RX ADMIN — SODIUM CHLORIDE, POTASSIUM CHLORIDE, SODIUM LACTATE AND CALCIUM CHLORIDE: 600; 310; 30; 20 INJECTION, SOLUTION INTRAVENOUS at 10:34

## 2022-11-23 RX ADMIN — FENTANYL CITRATE 50 MCG: 50 INJECTION, SOLUTION INTRAMUSCULAR; INTRAVENOUS at 11:14

## 2022-11-23 RX ADMIN — SODIUM CHLORIDE, POTASSIUM CHLORIDE, SODIUM LACTATE AND CALCIUM CHLORIDE: 600; 310; 30; 20 INJECTION, SOLUTION INTRAVENOUS at 10:46

## 2022-11-23 RX ADMIN — FUROSEMIDE 10 MG: 10 INJECTION, SOLUTION INTRAVENOUS at 12:35

## 2022-11-23 RX ADMIN — GLYCOPYRROLATE 0.2 MG: 0.2 INJECTION, SOLUTION INTRAMUSCULAR; INTRAVENOUS at 11:33

## 2022-11-23 RX ADMIN — DEXAMETHASONE SODIUM PHOSPHATE 8 MG: 4 INJECTION, SOLUTION INTRA-ARTICULAR; INTRALESIONAL; INTRAMUSCULAR; INTRAVENOUS; SOFT TISSUE at 11:06

## 2022-11-23 RX ADMIN — HYDROMORPHONE HYDROCHLORIDE 0.2 MG: 1 INJECTION, SOLUTION INTRAMUSCULAR; INTRAVENOUS; SUBCUTANEOUS at 13:13

## 2022-11-23 RX ADMIN — PROPOFOL 200 MG: 10 INJECTION, EMULSION INTRAVENOUS at 10:57

## 2022-11-23 RX ADMIN — LIDOCAINE HYDROCHLORIDE 80 MG: 20 INJECTION, SOLUTION INFILTRATION; PERINEURAL at 10:57

## 2022-11-23 RX ADMIN — Medication 5 MG: at 11:08

## 2022-11-23 RX ADMIN — AMPICILLIN SODIUM 2 G: 2 INJECTION, POWDER, FOR SOLUTION INTRAMUSCULAR; INTRAVENOUS at 10:42

## 2022-11-23 RX ADMIN — Medication 5 MG: at 11:05

## 2022-11-23 RX ADMIN — FENTANYL CITRATE 25 MCG: 50 INJECTION, SOLUTION INTRAMUSCULAR; INTRAVENOUS at 12:33

## 2022-11-23 RX ADMIN — ONDANSETRON 4 MG: 2 INJECTION INTRAMUSCULAR; INTRAVENOUS at 12:18

## 2022-11-23 RX ADMIN — FUROSEMIDE 10 MG: 10 INJECTION, SOLUTION INTRAVENOUS at 12:21

## 2022-11-23 ASSESSMENT — ACTIVITIES OF DAILY LIVING (ADL)
ADLS_ACUITY_SCORE: 24
ADLS_ACUITY_SCORE: 24
ADLS_ACUITY_SCORE: 22
ADLS_ACUITY_SCORE: 24

## 2022-11-23 NOTE — BRIEF OP NOTE
[facility]    Brief Operative Note    Pre-operative diagnosis: Benign prostatic hyperplasia, unspecified whether lower urinary tract symptoms present [N40.0]  Post-operative diagnosis Same as pre-operative diagnosis    Procedure: Procedure(s):  ENUCLEATION, PROSTATE, USING HOLMIUM LASER  Surgeon: Surgeon(s) and Role:     * Elio Nicole MD - Primary     * Binu Singh MD - Assisting     * Yvan Ariza MD - Resident - Assisting  Anesthesia: General   Estimated Blood Loss: Minimal    Drains: [address] martinez with 60 ml balloon  Specimens:   ID Type Source Tests Collected by Time Destination   1 :  Tissue Prostate SURGICAL PATHOLOGY EXAM Elio Nicole MD 11/23/2022 12:17 PM      Findings:   17 grams tissue removed.  Complications: None.  Implants: * No implants in log *

## 2022-11-23 NOTE — DISCHARGE INSTRUCTIONS

## 2022-11-23 NOTE — ANESTHESIA CARE TRANSFER NOTE
Patient: Ming Garnett    Procedure: Procedure(s):  ENUCLEATION, PROSTATE, USING HOLMIUM LASER       Diagnosis: Benign prostatic hyperplasia, unspecified whether lower urinary tract symptoms present [N40.0]  Diagnosis Additional Information: No value filed.    Anesthesia Type:   General     Note:    Oropharynx: oropharynx clear of all foreign objects and spontaneously breathing  Level of Consciousness: drowsy  Oxygen Supplementation: face mask  Level of Supplemental Oxygen (L/min / FiO2): 6  Independent Airway: airway patency satisfactory and stable  Dentition: dentition unchanged  Vital Signs Stable: post-procedure vital signs reviewed and stable  Report to RN Given: handoff report given  Patient transferred to: PACU    Handoff Report: Identifed the Patient, Identified the Reponsible Provider, Reviewed the pertinent medical history, Discussed the surgical course, Reviewed Intra-OP anesthesia mangement and issues during anesthesia, Set expectations for post-procedure period and Allowed opportunity for questions and acknowledgement of understanding      Vitals:  Vitals Value Taken Time   /94 11/23/22 1235   Temp     Pulse 87 11/23/22 1240   Resp 19 11/23/22 1240   SpO2 94 % 11/23/22 1240   Vitals shown include unvalidated device data.    Electronically Signed By: CORNELIO Ace CRNA  November 23, 2022  12:41 PM

## 2022-11-23 NOTE — ANESTHESIA POSTPROCEDURE EVALUATION
"Patient: Ming Garnett    Procedure: Procedure(s):  ENUCLEATION, PROSTATE, USING HOLMIUM LASER       Anesthesia Type:  General    Note:  Disposition: Outpatient   Postop Pain Control: Uneventful            Sign Out: Well controlled pain   PONV: No   Neuro/Psych: Uneventful            Sign Out: Acceptable/Baseline neuro status   Airway/Respiratory: Uneventful            Sign Out: Acceptable/Baseline resp. status   CV/Hemodynamics: Uneventful            Sign Out: Acceptable CV status; No obvious hypovolemia; No obvious fluid overload   Other NRE: NONE   DID A NON-ROUTINE EVENT OCCUR? No           Last vitals:  Vitals Value Taken Time   /84 11/23/22 1345   Temp 36.8  C (98.2  F) 11/23/22 1231   Pulse 81 11/23/22 1359   Resp 12 11/23/22 1359   SpO2 92 % 11/23/22 1359   Vitals shown include unvalidated device data.    Patient Vitals for the past 24 hrs:   BP Temp Temp src Pulse Resp SpO2 Height Weight   11/23/22 1330 (!) 151/88 -- -- 81 19 95 % -- --   11/23/22 1315 (!) 143/77 -- -- 71 11 96 % -- --   11/23/22 1300 (!) 144/70 -- -- 66 14 96 % -- --   11/23/22 1245 137/73 -- -- 71 13 95 % -- --   11/23/22 1231 (!) 165/95 36.8  C (98.2  F) Oral 87 12 98 % -- --   11/23/22 0949 (!) 145/81 36.7  C (98.1  F) Oral 67 20 98 % 1.727 m (5' 8\") 81.2 kg (179 lb 0.2 oz)         Electronically Signed By: Erica Olsen MD  November 23, 2022  2:24 PM  "

## 2022-11-23 NOTE — ANESTHESIA PREPROCEDURE EVALUATION
Anesthesia Pre-Procedure Evaluation    Patient: Ming Garnett   MRN: [id number] : 1955        Procedure : Procedure(s):  ENUCLEATION, PROSTATE, USING HOLMIUM LASER          Past Medical History:   Diagnosis Date     Hearing problem      Hypertension      Renal disease      Sensorineural hearing loss      Sleep apnea       No past surgical history on file.   No Known Allergies   Social History     Tobacco Use     Smoking status: Every Day     Packs/day: 0.50     Years: 22.00     Pack years: 11.00     Types: Cigarettes     Start date: 2000     Last attempt to quit: 2022     Years since quittin.0     Smokeless tobacco: Never   Substance Use Topics     Alcohol use: Yes     Alcohol/week: 0.0 standard drinks     Comment: a few beers per day       Wt Readings from Last 1 Encounters:   22 81.2 kg (179 lb 0.2 oz)        Anesthesia Evaluation            ROS/MED HX  ENT/Pulmonary:    (-) sleep apnea (Hx mild JAZZY patient has lost weight and no longer snores or obstructs)   Neurologic:  - neg neurologic ROS     Cardiovascular:     (+) Dyslipidemia hypertension-----    METS/Exercise Tolerance:     Hematologic:  - neg hematologic  ROS     Musculoskeletal:  - neg musculoskeletal ROS     GI/Hepatic:  - neg GI/hepatic ROS     Renal/Genitourinary:     (+) renal disease (stage 3a), type: CRI,     Endo:  - neg endo ROS     Psychiatric/Substance Use:     (+) psychiatric history anxiety and depression     Infectious Disease:  - neg infectious disease ROS     Malignancy:  - neg malignancy ROS     Other:            Physical Exam    Airway        Mallampati: II   TM distance: > 3 FB   Neck ROM: full   Mouth opening: > 3 cm    Respiratory Devices and Support         Dental  no notable dental history         Cardiovascular   cardiovascular exam normal       Rhythm and rate: regular and normal     Pulmonary   pulmonary exam normal        breath sounds clear to auscultation           OUTSIDE LABS:  CBC:   CBC  RESULTS: Recent Labs   Lab Test 11/08/22  0812 02/02/22  1042   WBC 10.1 11.1*   RBC 4.68 4.72   HGB 15.2 15.1   HCT 43.7 43.0   MCV 93 91   MCH 32.5 32.0   MCHC 34.8 35.1   RDW 12.6 12.7    291     Last Comprehensive Metabolic Panel:  Recent Labs   Lab Test 11/23/22  0955 11/08/22  0812 02/02/22  1042 09/15/20  0756 01/13/20  1013 01/02/20 0855   NA  --  139 136 136 138 136   POTASSIUM  --  4.2 3.6 4.1 3.6 3.5   CHLORIDE  --   --  104 106 104 104   CO2  --   --  26 25 30 24   ANIONGAP  --   --  6 5 4 8   BUN  --   --  20 19 22 29   CR  --  1.36* 1.26* 1.25 1.24 1.51*   GFRESTIMATED  --  57* 62 59* 60* 47*   GLC 95  --  101* 88 86 105*   EMMANUEL  --   --  9.5 9.8 9.3 9.5        Lab Results   Component Value Date    WBC 10.1 11/08/2022    WBC 11.1 (H) 02/02/2022    HGB 15.2 11/08/2022    HGB 15.1 02/02/2022    HCT 43.7 11/08/2022    HCT 43.0 02/02/2022     11/08/2022     02/02/2022     BMP:   Lab Results   Component Value Date     11/08/2022     02/02/2022    POTASSIUM 4.2 11/08/2022    POTASSIUM 3.6 02/02/2022    CHLORIDE 104 02/02/2022    CHLORIDE 106 09/15/2020    CO2 26 02/02/2022    CO2 25 09/15/2020    BUN 20 02/02/2022    BUN 19 09/15/2020    CR 1.36 (H) 11/08/2022    CR 1.26 (H) 02/02/2022    GLC 95 11/23/2022     (H) 02/02/2022     COAGS: No results found for: PTT, INR, FIBR  POC: No results found for: BGM, HCG, HCGS  HEPATIC:   Lab Results   Component Value Date    ALBUMIN 3.6 02/02/2022    PROTTOTAL 7.9 02/02/2022    ALT 43 02/02/2022    AST 26 02/02/2022    ALKPHOS 134 02/02/2022    BILITOTAL 0.5 02/02/2022     OTHER:   Lab Results   Component Value Date    A1C 5.5 11/08/2022    EMMANUEL 9.5 02/02/2022    TSH 1.78 02/02/2022       Anesthesia Plan    ASA Status:  2   NPO Status:  NPO Appropriate    Anesthesia Type: General.   Induction: Intravenous, Propofol.           Consents    Anesthesia Plan(s) and associated risks, benefits, and realistic alternatives discussed.  Questions answered and patient/representative(s) expressed understanding.    - Discussed:     - Discussed with:  Patient      - Extended Intubation/Ventilatory Support Discussed: No.      - Patient is DNR/DNI Status: No    Use of blood products discussed: No .     Postoperative Care    Pain management: Oral pain medications, IV analgesics, Multi-modal analgesia.   PONV prophylaxis: Ondansetron (or other 5HT-3)     Comments:                Erica Olsen MD

## 2022-11-23 NOTE — OP NOTE
OPERATIVE REPORT    PREOPERATIVE DIAGNOSIS: Benign Prostatic Hyperplasia     POSTOPERATIVE DIAGNOSIS: Same    PROCEDURES PERFORMED:   Holmium Laser Enucleation of the Prostate (HoLEP)    STAFF SURGEON: Elio Nicole MD was present and participatory for the entire case.   RESIDENT(S): Yvan Ariza MD  FELLOW: Binu Singh MD    ANESTHESIA: General  ESTIMATED BLOOD LOSS: 10 ml  COMPLICATIONS: None.   SPECIMEN: Prostate Tissue     SIGNIFICANT FINDINGS:   Enucleation time: 47 min  Morcellation time: 2 min  Tissue weight: 17 g  Total energy: 91.81 kJ    BRIEF OPERATIVE INDICATIONS: Ming Garnett is a 67 year old year old male who presented with lower urinary tract symptoms and enlarged prostate of 33 grams.  After a discussion of all risks, benefits, and alternatives, the patient elected to proceed with HoLEP.    DESCRIPTION OF PROCEDURE:  After informed consent was obtained, the patient was transported to the operating room & placed supine on the table. After adequate anesthesia was induced, the patient was placed in lithotomy and prepped and draped in the usual sterile fashion. A timeout was taken to confirm correct patient, procedure and laterality. Pre-operative IV antibiotics were administered.     The urethra was injected with lubricant jelly and was calibrated with sounds from 22 fr to 26fr sequentially in 2 fr increments.  These passed easily, and then 24 fr sheath was placed with the obturator.  The bladder was unremarkable.  The ureteral orifices were orthotopic.  The prostate had Bilobar anatomy with outlet obstruction.    The dissection was started at the apex by demarcating a line circumferentially just proximal to the urinary sphincter.    Incisions were made just proximal to the verumontanum in an inverted U shape to demarcate the distal extent of the incision. We started at the bladder neck and made incision at the 6 clock position and took the incision to the verumontanum. This incision  was deepened until we reached the capsule.     Right Lobe Release  Incisions were then made just lateral to the verumontanum to find the transition zone enucleation plane. The right plane was dissected a combination of laser and blunt dissection.  As the plane was developed, the previously incised mucosa was continually checked to ensure detachment. This plane was taken up to the 12 o' clock position. The anterior enucleation was carried to the bladder neck.  After ensuring careful hemostasis as the bladder is entered through the bladder neck, the 10-2 o'clock bladder neck attachments were dissected.  The right side was then further dissected, carrying the dissection from the bladder neck down to the posterior side, and then working the tissue forward.We next identified the mucosal strip anteriorly and transected it with the laser.  We then made incision working from apex to the bladder neck in the midline  the right and the left lobes. The posterior and lateral plan was joined and the adenoma was enucleated to the bladder neck.  The bladder neck mucosa was then cut and then the right lateral lobe adenoma was liberated into the bladder.    Left Lobe Release  Incisions were then made just lateral on the left of the verumontanum to find the transition zone enucleation plane. As the plane was developed, the previously incised mucosa was continually checked to ensure detachment. This plane was taken up to the 12 o' clock position. The left side was then further dissected, carrying the dissection from the bladder neck down to the posterior side, and then working the tissue forward. We next identified the mucosal strip anteriorly and transected it with the laser and ensured the adenoma was completely free from the apex. We then proceeded to take down the remaining lateral and posterior attachments which allowed us to push the adenoma into the bladder. The bladder neck mucosa was then cut and then the left  lateral lobe adenoma was liberated into the bladder.      Hemostasis  The prostatic fossa was examined and meticulous hemostasis was achieved with the laser, with special attention to the bladder neck and apex.    Morcellation  The laserscope was removed and the extra long offset nephroscope was inserted and the [place] morcellator was introduced.  After attaching two inflows and confirming they were open and bladder was distended, morcellation was carried out at a rate of 1500 oscillations/min.  After the adenoma was morcellated, the fossa was inspected again and any areas of bleeding were lasered for hemostasis.    A 22 fr 3 way martinez catheter was inserted with 60 ml in the balloon.  Continuous bladder irrigation was started and patient was undraped.  They were awakened from anesthesia and transferred to the PACU.       POSTOP PLAN:  Patient will be observed in PACU if urine remains clear discharge home with void trial on 11/25  Home Antibiotics Macrobid 100 bid 1 week    I, Elio Nicole, was present for the entire case on 11/23/2022.

## 2022-11-23 NOTE — ANESTHESIA PROCEDURE NOTES
Airway       Patient location during procedure: OR       Procedure Start/Stop Times: 11/23/2022 10:51 AM  Staff -        Anesthesiologist:  Erica Olsen MD       CRNA: Nubia Phillips APRN CRNA       Performed By: CRNA  Consent for Airway        Urgency: elective  Indications and Patient Condition       Indications for airway management: senait-procedural       Induction type:intravenous       Mask difficulty assessment: 1 - vent by mask    Final Airway Details       Final airway type: supraglottic airway    Supraglottic Airway Details        Type: LMA       Brand: Air-Q       LMA size: 5    Post intubation assessment        Placement verified by: capnometry and equal breath sounds        Number of attempts at approach: 1       Secured with: silk tape       Ease of procedure: easy       Dentition: Intact and Unchanged    Medication(s) Administered   Medication Administration Time: 11/23/2022 10:51 AM

## 2022-11-23 NOTE — PROVIDER NOTIFICATION
Dr Olsen notified of increasing frequency of non-sustained single PVCs in PACU. Order received for basic metabolic panel to be drawn. Patient will continue to be monitored.    Update 1400- PVCs rare; [facility] discontinued lab order. Patient meeting phase 1 discharge criteria.

## 2022-11-25 ENCOUNTER — TELEPHONE (OUTPATIENT)
Dept: UROLOGY | Facility: CLINIC | Age: 67
End: 2022-11-25

## 2022-11-25 ENCOUNTER — OFFICE VISIT (OUTPATIENT)
Dept: UROLOGY | Facility: CLINIC | Age: 67
End: 2022-11-25
Payer: COMMERCIAL

## 2022-11-25 DIAGNOSIS — N40.0 BENIGN PROSTATIC HYPERPLASIA, UNSPECIFIED WHETHER LOWER URINARY TRACT SYMPTOMS PRESENT: Primary | ICD-10-CM

## 2022-11-25 LAB
PATH REPORT.COMMENTS IMP SPEC: NORMAL
PATH REPORT.COMMENTS IMP SPEC: NORMAL
PATH REPORT.FINAL DX SPEC: NORMAL
PATH REPORT.GROSS SPEC: NORMAL
PATH REPORT.MICROSCOPIC SPEC OTHER STN: NORMAL
PATH REPORT.RELEVANT HX SPEC: NORMAL
PHOTO IMAGE: NORMAL

## 2022-11-25 PROCEDURE — 88305 TISSUE EXAM BY PATHOLOGIST: CPT | Mod: 26 | Performed by: PATHOLOGY

## 2022-11-25 PROCEDURE — 99024 POSTOP FOLLOW-UP VISIT: CPT

## 2022-11-25 NOTE — PROGRESS NOTES
Chief Complaint   Patient presents with     Allied Health Visit     Trial of Void       There were no vitals taken for this visit. There is no height or weight on file to calculate BMI.    Patient Active Problem List   Diagnosis     HTN (hypertension), benign     Insomnia, unspecified type     Depression with anxiety     Hyperlipidemia LDL goal <130     Mild major depression (H)     Stage 3a chronic kidney disease (H)     Cigarette smoker     Albuminuria     Benign prostatic hyperplasia with nocturia       No Known Allergies    Current Outpatient Medications   Medication Sig Dispense Refill     amLODIPine (NORVASC) 10 MG tablet TAKE 1 TABLET EVERY DAY 90 tablet 3     atenolol (TENORMIN) 50 MG tablet Take 1 tablet (50 mg) by mouth daily 90 tablet 3     atorvastatin (LIPITOR) 40 MG tablet Take 1 tablet (40 mg) by mouth daily 90 tablet 3     clotrimazole (LOTRIMIN) 1 % external cream Apply to affected area BID X 1 month supply 2 g 1     fluticasone (FLONASE) 50 MCG/ACT nasal spray Spray 1 spray in nostril daily 16 g 11     hydrochlorothiazide (HYDRODIURIL) 25 MG tablet TAKE 1 TABLET EVERY DAY 90 tablet 3     losartan (COZAAR) 100 MG tablet TAKE 1 TABLET EVERY DAY 90 tablet 3     nicotine (NICODERM CQ) 21 MG/24HR 24 hr patch Place 1 patch onto the skin every 24 hours 28 patch 2     nitroFURantoin macrocrystal-monohydrate (MACROBID) 100 MG capsule Take 1 capsule (100 mg) by mouth 2 times daily 14 capsule 0     sertraline (ZOLOFT) 100 MG tablet Take 1 tablet (100 mg) by mouth daily 90 tablet 3     triamcinolone (KENALOG) 0.1 % external ointment APPLY 1 APPLICATION TWICE A DAY AS NEEDED TO BUTTOCK AREA 80 g 3     zolpidem (AMBIEN) 5 MG tablet Take 1 tablet (5 mg) by mouth nightly as needed for sleep Take tablet by mouth 15 minutes prior to sleep, for Sleep Study 30 tablet 1       Social History     Tobacco Use     Smoking status: Every Day     Packs/day: 0.50     Years: 22.00     Pack years: 11.00     Types: Cigarettes      Start date: 2000     Last attempt to quit: 2022     Years since quittin.0     Smokeless tobacco: Never   Substance Use Topics     Alcohol use: Yes     Alcohol/week: 0.0 standard drinks     Comment: a few beers per day      Drug use: Never     Ming Garnett comes into clinic today at the request of Dr. Elio Nicole with the diagnosis of BPH s/p HoLEP for a Trial of Void.    Patient not given prophylactic antibiotic. Patient is currently on antibiotics given after HoLEP procedure. Patient states he took prophylactic antibiotic in the morning prior to arriving for appointment.    Approx 100 mL of sterile water instilled into the bladder via catheter.    Removal:  22 Fr straight tipped latex martinez catheter removed from urethral meatus without difficulty after removing 60 mL of fluid from the balloon, balloon intact.    Patient voided approx 125 mL of red urine.     Post-void residual was: 0 mL per bladder scan.    Patient tolerated procedure well.      Education: Teaching done with patient verbally as to where to go or call  if unable to urinate post-catheter removal. Increase fluids.     Plan: Follow-up as scheduled with Dr. Nicole    This service provided today was under the supervising provider of the day Dr. Elio Nicole, who was available if needed.    Rolando Wong, EMT  2022  1:19 PM

## 2022-12-26 ENCOUNTER — HOSPITAL ENCOUNTER (OUTPATIENT)
Dept: CT IMAGING | Facility: HOSPITAL | Age: 67
Discharge: HOME | End: 2022-12-26
Attending: NURSE PRACTITIONER | Admitting: NURSE PRACTITIONER
Payer: COMMERCIAL

## 2022-12-26 ENCOUNTER — OFFICE VISIT (OUTPATIENT)
Dept: FAMILY MEDICINE | Facility: CLINIC | Age: 67
End: 2022-12-26
Payer: COMMERCIAL

## 2022-12-26 VITALS
DIASTOLIC BLOOD PRESSURE: 93 MMHG | RESPIRATION RATE: 18 BRPM | BODY MASS INDEX: 26.75 KG/M2 | WEIGHT: 175.9 LBS | SYSTOLIC BLOOD PRESSURE: 167 MMHG | TEMPERATURE: 98.5 F | OXYGEN SATURATION: 99 % | HEART RATE: 62 BPM

## 2022-12-26 DIAGNOSIS — R10.9 FLANK PAIN: Primary | ICD-10-CM

## 2022-12-26 DIAGNOSIS — R10.9 FLANK PAIN: ICD-10-CM

## 2022-12-26 LAB
ALBUMIN UR-MCNC: 100 MG/DL
APPEARANCE UR: ABNORMAL
BACTERIA #/AREA URNS HPF: ABNORMAL /HPF
BILIRUB UR QL STRIP: NEGATIVE
COLOR UR AUTO: YELLOW
GLUCOSE UR STRIP-MCNC: NEGATIVE MG/DL
HGB UR QL STRIP: ABNORMAL
KETONES UR STRIP-MCNC: NEGATIVE MG/DL
LEUKOCYTE ESTERASE UR QL STRIP: ABNORMAL
NITRATE UR QL: NEGATIVE
PH UR STRIP: 6.5 [PH] (ref 5–8)
RBC #/AREA URNS AUTO: ABNORMAL /HPF
SP GR UR STRIP: >=1.03 (ref 1–1.03)
SQUAMOUS #/AREA URNS AUTO: ABNORMAL /LPF
UROBILINOGEN UR STRIP-ACNC: 0.2 E.U./DL
WBC #/AREA URNS AUTO: ABNORMAL /HPF

## 2022-12-26 PROCEDURE — 87086 URINE CULTURE/COLONY COUNT: CPT | Performed by: NURSE PRACTITIONER

## 2022-12-26 PROCEDURE — 74176 CT ABD & PELVIS W/O CONTRAST: CPT

## 2022-12-26 PROCEDURE — 81001 URINALYSIS AUTO W/SCOPE: CPT | Performed by: NURSE PRACTITIONER

## 2022-12-26 PROCEDURE — 99213 OFFICE O/P EST LOW 20 MIN: CPT | Performed by: NURSE PRACTITIONER

## 2022-12-26 RX ORDER — IBUPROFEN 200 MG
400 TABLET ORAL ONCE
Status: COMPLETED | OUTPATIENT
Start: 2022-12-26 | End: 2022-12-26

## 2022-12-26 RX ADMIN — Medication 400 MG: at 14:48

## 2022-12-26 ASSESSMENT — ENCOUNTER SYMPTOMS
CHILLS: 0
DYSURIA: 0
FEVER: 0
HEMATURIA: 0

## 2022-12-26 NOTE — PROGRESS NOTES
Assessment & Plan     Flank pain    - UA macro with reflex to Microscopic and Culture - Clinc Collect  - Urine Microscopic  - CT Abdomen Pelvis w/o Contrast  - ibuprofen (ADVIL/MOTRIN) tablet 400 mg  - Urine Culture Aerobic Bacterial     Patient who is status post laser enucleation of his prostate to reduce prostate size on November 23 with right flank pain wrapping somewhat around to the front with hematuria and 5-10 white cells on UA.  No irritative voiding symptoms/dysuria.  Does have some need to start and stop his stream.  Is tender in a specific area just above the right iliac crest.    CT to check for stones is negative.  No hydronephrosis.  Bladder decompressed.  No CVA tenderness.    Symptomatic care with Tylenol or ibuprofen.  Informed would prefer Tylenol first scheduled and then ibuprofen as needed.  See AVS.  Rest.  Urine culture sent.  Patient felt better with 400 mg ibuprofen given here    Recheck if worse.  Does have PCP appointment scheduled on January 2 and can check in regarding this at that time if still present.            No follow-ups on file.    Aviva Pink, [facility]     Ming Garnett is a 67 year old male who presents to clinic today for the following health issues:  Chief Complaint   Patient presents with     Back Pain     Pt states started 4 day right side flank pain      HPI    Rt lower back/flank symptoms starting 4 days ago. Leaning to the left is better.  Hard to sleep.  Better if lying on affected side.  No injuries.  Rates pain 5/10.  Tried tylenol - 3 regular strength.  Wraps around to the right side anterior abdomen somewhat.      Had laser prostate surgery 1 month ago for enlarged prostate.  Had catheter which was removed 1 week ago.    Symptoms Include:   Frequency [x], does feel like he is emptying his bladder.  Does not think he has urinary retention at this time.    Denies:  Fevers, history of kidney stone, visible hematuria,  clots    History includes: Recent elevated albumin.  Patient plans on following up with kidney specialist when they are available.  Recent creatinine 1.23.  Is on hydrochlorothiazide.      Review of Systems   Constitutional: Negative for chills and fever.   Genitourinary: Negative for dysuria and hematuria.           Objective    BP (!) 167/93 (BP Location: Right arm, Patient Position: Sitting, Cuff Size: Adult Regular)   Pulse 62   Temp 98.5  F (36.9  C) (Oral)   Resp 18   Wt 79.8 kg (175 lb 14.4 oz)   SpO2 99%   BMI 26.75 kg/m    Physical Exam  Constitutional:       Appearance: He is well-developed.   HENT:      Right Ear: External ear normal.      Left Ear: External ear normal.   Eyes:      General:         Right eye: No discharge.         Left eye: No discharge.      Conjunctiva/sclera: Conjunctivae normal.   Pulmonary:      Effort: Pulmonary effort is normal.   Abdominal:      Tenderness: There is no right CVA tenderness or left CVA tenderness.   Musculoskeletal:         General: Normal range of motion.      Cervical back: No erythema.      Comments: Pain worsens with leaning towards the right side.  Area of point tenderness located right lateral lower back just above the superior portion of the iliac crest.  No CVA tenderness.   Skin:     General: Skin is warm.   Neurological:      Mental Status: He is alert and oriented to person, place, and time.   Psychiatric:         Mood and Affect: Mood normal.         Behavior: Behavior normal.         Thought Content: Thought content normal.         Judgment: Judgment normal.            Results for orders placed or performed during the hospital encounter of 12/26/22   CT Abdomen Pelvis w/o Contrast     Status: None    Narrative    EXAM: CT ABDOMEN PELVIS W/O CONTRAST  LOCATION: [facility]  DATE/TIME: 12/26/2022 3:13 PM    INDICATION: Right flank pain. Hematuria. Recent prostate surgery.  COMPARISON: Prostate MRI 09/05/2021. CT chest  06/21/2021.  TECHNIQUE: CT scan of the abdomen and pelvis was performed without IV contrast. Multiplanar reformats were obtained. Dose reduction techniques were used.  CONTRAST: None.    FINDINGS:   LOWER CHEST: Normal.    HEPATOBILIARY: Normal.    PANCREAS: Normal.    SPLEEN: Normal.    ADRENAL GLANDS: Benign lipid rich 2.2 cm right adrenal adenoma. Normal left adrenal gland.    KIDNEYS/BLADDER: Scattered benign right renal cysts, including a simple 2 cm cyst at the upper pole and a few small hemorrhagic cysts measuring up to 1.3 cm at the upper pole. No urinary calculi or hydronephrosis. Urinary bladder is decompressed and   otherwise normal.    BOWEL: No obstruction or inflammation. Normal appendix. Numerous noninflamed descending and sigmoid colonic diverticuli.    LYMPH NODES: No enlarged lymph node.    VASCULATURE: Scattered atherosclerotic calcifications throughout the abdominal aorta. No abdominal aortic aneurysm.    PELVIC ORGANS: Status post laser enucleation of the prostate. No fluid collections.    MUSCULOSKELETAL: Small fat-containing left inguinal hernia. Tiny fat-containing periumbilical hernia. Multilevel degenerative changes of the spine. Mild osteoarthrosis of both hips. No acute bony abnormality.      Impression    IMPRESSION:     1.  No urinary calculi, hydronephrosis, or specific cause of flank pain identified.    2.  Status post laser enucleation of the prostate. No fluid collections.    3.  Scattered small benign right renal cysts, including hemorrhagic cysts, which do not require follow-up.   Results for orders placed or performed in visit on 12/26/22   UA macro with reflex to Microscopic and Culture - Clinc Collect     Status: Abnormal    Specimen: Urine, Clean Catch   Result Value Ref Range    Color Urine Yellow Colorless, Straw, Light Yellow, Yellow    Appearance Urine Slightly Cloudy (A) Clear    Glucose Urine Negative Negative mg/dL    Bilirubin Urine Negative Negative    Ketones Urine  Negative Negative mg/dL    Specific Gravity Urine >=1.030 1.005 - 1.030    Blood Urine Moderate (A) Negative    pH Urine 6.5 5.0 - 8.0    Protein Albumin Urine 100 (A) Negative mg/dL    Urobilinogen Urine 0.2 0.2, 1.0 E.U./dL    Nitrite Urine Negative Negative    Leukocyte Esterase Urine Small (A) Negative   Urine Microscopic     Status: Abnormal   Result Value Ref Range    Bacteria Urine Few (A) None Seen /HPF    RBC Urine 10-25 (A) 0-2 /HPF /HPF    WBC Urine 5-10 (A) 0-5 /HPF /HPF    Squamous Epithelials Urine Few (A) None Seen /LPF    Narrative    Urine Culture not indicated       Results for orders placed or performed in visit on 12/26/22 (from the past 24 hour(s))   UA macro with reflex to Microscopic and Culture - Clinc Collect    Specimen: Urine, Clean Catch   Result Value Ref Range    Color Urine Yellow Colorless, Straw, Light Yellow, Yellow    Appearance Urine Slightly Cloudy (A) Clear    Glucose Urine Negative Negative mg/dL    Bilirubin Urine Negative Negative    Ketones Urine Negative Negative mg/dL    Specific Gravity Urine >=1.030 1.005 - 1.030    Blood Urine Moderate (A) Negative    pH Urine 6.5 5.0 - 8.0    Protein Albumin Urine 100 (A) Negative mg/dL    Urobilinogen Urine 0.2 0.2, 1.0 E.U./dL    Nitrite Urine Negative Negative    Leukocyte Esterase Urine Small (A) Negative   Urine Microscopic   Result Value Ref Range    Bacteria Urine Few (A) None Seen /HPF    RBC Urine 10-25 (A) 0-2 /HPF /HPF    WBC Urine 5-10 (A) 0-5 /HPF /HPF    Squamous Epithelials Urine Few (A) None Seen /LPF    Narrative    Urine Culture not indicated

## 2022-12-26 NOTE — PATIENT INSTRUCTIONS
Preferably with your kidneys, recommend 1000 mg Tylenol 3 times daily since this does not affect your kidneys.  If necessary, you can use 400 mg of ibuprofen every 6 hours as needed.    Warm packs to affected area.    Rest, avoid heavy lifting.  Recheck if burning with urination or fevers or worsening symptoms.

## 2022-12-28 LAB — BACTERIA UR CULT: NO GROWTH

## 2023-01-03 ENCOUNTER — OFFICE VISIT (OUTPATIENT)
Dept: FAMILY MEDICINE | Facility: CLINIC | Age: 68
End: 2023-01-03
Payer: COMMERCIAL

## 2023-01-03 VITALS
DIASTOLIC BLOOD PRESSURE: 78 MMHG | SYSTOLIC BLOOD PRESSURE: 110 MMHG | BODY MASS INDEX: 26.52 KG/M2 | HEART RATE: 69 BPM | HEIGHT: 68 IN | OXYGEN SATURATION: 94 % | TEMPERATURE: 98.5 F | WEIGHT: 175 LBS

## 2023-01-03 DIAGNOSIS — R31.21 ASYMPTOMATIC MICROSCOPIC HEMATURIA: ICD-10-CM

## 2023-01-03 DIAGNOSIS — M79.89 SOFT TISSUE MASS: Primary | ICD-10-CM

## 2023-01-03 DIAGNOSIS — R10.9 RIGHT FLANK PAIN: ICD-10-CM

## 2023-01-03 LAB
ALBUMIN SERPL BCG-MCNC: 4.3 G/DL (ref 3.5–5.2)
ALBUMIN UR-MCNC: 100 MG/DL
ALP SERPL-CCNC: 112 U/L (ref 40–129)
ALT SERPL W P-5'-P-CCNC: 45 U/L (ref 10–50)
ANION GAP SERPL CALCULATED.3IONS-SCNC: 15 MMOL/L (ref 7–15)
APPEARANCE UR: CLEAR
AST SERPL W P-5'-P-CCNC: 37 U/L (ref 10–50)
BACTERIA #/AREA URNS HPF: ABNORMAL /HPF
BILIRUB SERPL-MCNC: 0.5 MG/DL
BILIRUB UR QL STRIP: NEGATIVE
BUN SERPL-MCNC: 22.8 MG/DL (ref 8–23)
CALCIUM SERPL-MCNC: 9.9 MG/DL (ref 8.8–10.2)
CHLORIDE SERPL-SCNC: 101 MMOL/L (ref 98–107)
COLOR UR AUTO: YELLOW
CREAT SERPL-MCNC: 1.4 MG/DL (ref 0.67–1.17)
DEPRECATED HCO3 PLAS-SCNC: 25 MMOL/L (ref 22–29)
ERYTHROCYTE [DISTWIDTH] IN BLOOD BY AUTOMATED COUNT: 12.6 % (ref 10–15)
GFR SERPL CREATININE-BSD FRML MDRD: 54 ML/MIN/1.73M2
GLUCOSE SERPL-MCNC: 95 MG/DL (ref 70–99)
GLUCOSE UR STRIP-MCNC: NEGATIVE MG/DL
HCT VFR BLD AUTO: 41.3 % (ref 40–53)
HGB BLD-MCNC: 14.7 G/DL (ref 13.3–17.7)
HGB UR QL STRIP: NEGATIVE
KETONES UR STRIP-MCNC: NEGATIVE MG/DL
LEUKOCYTE ESTERASE UR QL STRIP: ABNORMAL
LIPASE SERPL-CCNC: 25 U/L (ref 13–60)
MCH RBC QN AUTO: 32.2 PG (ref 26.5–33)
MCHC RBC AUTO-ENTMCNC: 35.6 G/DL (ref 31.5–36.5)
MCV RBC AUTO: 91 FL (ref 78–100)
NITRATE UR QL: NEGATIVE
PH UR STRIP: 6.5 [PH] (ref 5–8)
PLATELET # BLD AUTO: 266 10E3/UL (ref 150–450)
POTASSIUM SERPL-SCNC: 3.5 MMOL/L (ref 3.4–5.3)
PROT SERPL-MCNC: 7.5 G/DL (ref 6.4–8.3)
RBC # BLD AUTO: 4.56 10E6/UL (ref 4.4–5.9)
RBC #/AREA URNS AUTO: ABNORMAL /HPF
SODIUM SERPL-SCNC: 141 MMOL/L (ref 136–145)
SP GR UR STRIP: 1.02 (ref 1–1.03)
SQUAMOUS #/AREA URNS AUTO: ABNORMAL /LPF
UROBILINOGEN UR STRIP-ACNC: 1 E.U./DL
WBC # BLD AUTO: 9.4 10E3/UL (ref 4–11)
WBC #/AREA URNS AUTO: ABNORMAL /HPF

## 2023-01-03 PROCEDURE — 83690 ASSAY OF LIPASE: CPT | Performed by: NURSE PRACTITIONER

## 2023-01-03 PROCEDURE — 85027 COMPLETE CBC AUTOMATED: CPT | Performed by: NURSE PRACTITIONER

## 2023-01-03 PROCEDURE — 81001 URINALYSIS AUTO W/SCOPE: CPT | Performed by: NURSE PRACTITIONER

## 2023-01-03 PROCEDURE — 99214 OFFICE O/P EST MOD 30 MIN: CPT | Performed by: NURSE PRACTITIONER

## 2023-01-03 PROCEDURE — 80053 COMPREHEN METABOLIC PANEL: CPT | Performed by: NURSE PRACTITIONER

## 2023-01-03 PROCEDURE — 36415 COLL VENOUS BLD VENIPUNCTURE: CPT | Performed by: NURSE PRACTITIONER

## 2023-01-03 RX ORDER — ZOLPIDEM TARTRATE 5 MG/1
5 TABLET ORAL
Qty: 30 TABLET | Refills: 1 | Status: CANCELLED | OUTPATIENT
Start: 2023-01-03

## 2023-01-03 ASSESSMENT — PATIENT HEALTH QUESTIONNAIRE - PHQ9
SUM OF ALL RESPONSES TO PHQ QUESTIONS 1-9: 5
SUM OF ALL RESPONSES TO PHQ QUESTIONS 1-9: 5
10. IF YOU CHECKED OFF ANY PROBLEMS, HOW DIFFICULT HAVE THESE PROBLEMS MADE IT FOR YOU TO DO YOUR WORK, TAKE CARE OF THINGS AT HOME, OR GET ALONG WITH OTHER PEOPLE: NOT DIFFICULT AT ALL

## 2023-01-03 ASSESSMENT — PAIN SCALES - GENERAL: PAINLEVEL: EXTREME PAIN (8)

## 2023-01-03 NOTE — PROGRESS NOTES
Assessment and Plan:       ICD-10-CM    1. Soft tissue mass  M79.89 CBC with platelets     Comprehensive metabolic panel (BMP + Alb, Alk Phos, ALT, AST, Total. Bili, TP)     Lipase     UA Macro with Reflex to Micro and Culture - lab collect     US Abdomen Complete     CBC with platelets     Comprehensive metabolic panel (BMP + Alb, Alk Phos, ALT, AST, Total. Bili, TP)     Lipase     UA Macro with Reflex to Micro and Culture - lab collect      2. Right flank pain  R10.9       3. Asymptomatic microscopic hematuria  R31.21           Differentials include soft tissue mass, hernia, bowel obstruction, abscess, lymphadenopathy.  Will check hemogram, CMP, lipase, urinalysis. Will obtain ultrasound for further evaluation.  Further plans pending the results.  Discussed symptomatic treatment with OTC Tylenol and Ibuprofen.  If fever develops or pain worsens, suggest follow-up.  I recommend he speak to his urologist regarding hematuria.  This may be a normal finding s/p prostate resection.  He is content with the plan.     Subjective:     Ming is a 67 year old male presenting to the clinic for concerns for soft tissue mass.  Patient had been experiencing right flank pain for 2 days when he presented to urgent care on 12/26/2022.  He is status post laser enucleation of his prostate to reduce prostate size on November 23.  Urinalysis showed hematuria.  CT of the abdomen and pelvis was performed which was negative for nephrolithiasis.  No hydronephrosis was noted.  Bladder was decompressed.  Symptomatic care was recommended.  3 days ago, he developed a bulge within his right mid abdomen.  He continues to experience intermittent sharp pain which he rates an 8/10.  Movement exacerbates the pain. Applying a heating pad provides assistance.  He has difficulty sleeping on his left side.  He denies nausea, vomiting, constipation, diarrhea.  He has not had any blood or mucus in the stool.  No fever has been present.    Reviewof Systems:  A complete 14 point review of systems was obtained and is negative or as stated in the history of present illness.    Social History     Socioeconomic History     Marital status:      Spouse name: Not on file     Number of children: Not on file     Years of education: Not on file     Highest education level: Not on file   Occupational History     Not on file   Tobacco Use     Smoking status: Every Day     Packs/day: 0.50     Years: 22.00     Pack years: 11.00     Types: Cigarettes     Start date: 2000     Last attempt to quit: 2022     Years since quittin.1     Smokeless tobacco: Never   Substance and Sexual Activity     Alcohol use: Yes     Alcohol/week: 0.0 standard drinks     Comment: a few beers per day      Drug use: Never     Sexual activity: Yes     Partners: Female   Other Topics Concern     Parent/sibling w/ CABG, MI or angioplasty before 65F 55M? No   Social History Narrative     Not on file     Social Determinants of Health     Financial Resource Strain: Not on file   Food Insecurity: Not on file   Transportation Needs: Not on file   Physical Activity: Not on file   Stress: Not on file   Social Connections: Not on file   Intimate Partner Violence: Not on file   Housing Stability: Not on file       Active Ambulatory Problems     Diagnosis Date Noted     HTN (hypertension), benign 12/15/2009     Insomnia, unspecified type 2016     Depression with anxiety 12/15/2009     Hyperlipidemia LDL goal <130 09/15/2020     Mild major depression (H) 2022     Stage 3a chronic kidney disease (H) 2022     Cigarette smoker 2022     Albuminuria 11/15/2022     Benign prostatic hyperplasia with nocturia 11/15/2022     Resolved Ambulatory Problems     Diagnosis Date Noted     Erectile dysfunction, unspecified erectile dysfunction type 2016     Past Medical History:   Diagnosis Date     Hearing problem      Hypertension      Renal disease      Sensorineural hearing loss       "Sleep apnea        Family History   Problem Relation Age of Onset     Breast Cancer Mother      Hypertension Mother      Other Cancer Father      Aneurysm Father      Hypertension Father        Objective:     /78 (BP Location: Left arm, Patient Position: Sitting, Cuff Size: Adult Regular)   Pulse 69   Temp 98.5  F (36.9  C) (Oral)   Ht 1.727 m (5' 8\")   Wt 79.4 kg (175 lb)   SpO2 94%   BMI 26.61 kg/m      Patient is alert, in no obvious distress.   Skin: Warm, dry.   Lungs:  Clear to auscultation. Respirations even and unlabored.  No wheezing or rales noted.   Heart:  Regular rate and rhythm.  No murmurs, S3, S4, gallops, or rubs.    Abdomen: Soft, nontender.  No organomegaly. Bowel sounds normoactive. No guarding.  Obvious bulge of the abdomen present when he is standing.  This is located within his right mid abdomen.  Appears baseball in size.   Musculoskeletal:  CVA tenderness is negative.       Answers for HPI/ROS submitted by the patient on 1/3/2023  If you checked off any problems, how difficult have these problems made it for you to do your work, take care of things at home, or get along with other people?: Not difficult at all  PHQ9 TOTAL SCORE: 5  What is the reason for your visit today? : Pain right side my stomach  How many servings of fruits and vegetables do you eat daily?: 0-1  On average, how many sweetened beverages do you drink each day (Examples: soda, juice, sweet tea, etc.  Do NOT count diet or artificially sweetened beverages)?: 3  How many minutes a day do you exercise enough to make your heart beat faster?: 9 or less  How many days a week do you exercise enough to make your heart beat faster?: 3 or less  How many days per week do you miss taking your medication?: 0      "

## 2023-01-09 ENCOUNTER — HOSPITAL ENCOUNTER (OUTPATIENT)
Dept: ULTRASOUND IMAGING | Facility: HOSPITAL | Age: 68
Discharge: HOME | End: 2023-01-09
Attending: NURSE PRACTITIONER | Admitting: NURSE PRACTITIONER
Payer: COMMERCIAL

## 2023-01-09 DIAGNOSIS — M79.89 SOFT TISSUE MASS: ICD-10-CM

## 2023-01-09 PROCEDURE — 76705 ECHO EXAM OF ABDOMEN: CPT

## 2023-02-28 DIAGNOSIS — I10 BENIGN ESSENTIAL HYPERTENSION: ICD-10-CM

## 2023-02-28 DIAGNOSIS — F41.1 GAD (GENERALIZED ANXIETY DISORDER): ICD-10-CM

## 2023-02-28 DIAGNOSIS — Z91.89 FRAMINGHAM CARDIAC RISK >20% IN NEXT 10 YEARS: ICD-10-CM

## 2023-03-02 RX ORDER — SERTRALINE HYDROCHLORIDE 100 MG/1
100 TABLET, FILM COATED ORAL DAILY
Qty: 90 TABLET | Refills: 3 | Status: SHIPPED | OUTPATIENT
Start: 2023-03-02 | End: 2023-03-02

## 2023-03-02 RX ORDER — AMLODIPINE BESYLATE 10 MG/1
TABLET ORAL
Qty: 60 TABLET | Refills: 0 | Status: SHIPPED | OUTPATIENT
Start: 2023-03-02 | End: 2023-05-12

## 2023-03-02 RX ORDER — ATORVASTATIN CALCIUM 40 MG/1
40 TABLET, FILM COATED ORAL DAILY
Qty: 60 TABLET | Refills: 5 | Status: SHIPPED | OUTPATIENT
Start: 2023-03-02 | End: 2023-09-15

## 2023-03-02 RX ORDER — ATENOLOL 50 MG/1
50 TABLET ORAL DAILY
Qty: 60 TABLET | Refills: 0 | Status: SHIPPED | OUTPATIENT
Start: 2023-03-02 | End: 2023-05-18

## 2023-03-02 RX ORDER — HYDROCHLOROTHIAZIDE 25 MG/1
TABLET ORAL
Qty: 60 TABLET | Refills: 0 | Status: SHIPPED | OUTPATIENT
Start: 2023-03-02 | End: 2023-05-19

## 2023-03-02 RX ORDER — LOSARTAN POTASSIUM 100 MG/1
TABLET ORAL
Qty: 60 TABLET | Refills: 0 | Status: SHIPPED | OUTPATIENT
Start: 2023-03-02 | End: 2023-05-19

## 2023-03-02 RX ORDER — SERTRALINE HYDROCHLORIDE 100 MG/1
100 TABLET, FILM COATED ORAL DAILY
Qty: 60 TABLET | Refills: 0 | Status: SHIPPED | OUTPATIENT
Start: 2023-03-02 | End: 2024-03-25

## 2023-03-02 NOTE — TELEPHONE ENCOUNTER
Routing refills to [facility].     Pamela Oseguera, BSN RN  [facility]

## 2023-03-02 NOTE — TELEPHONE ENCOUNTER
"Routing refill request to provider for review/approval because:  Labs out of range:  CR abdnormal  Labs not current:  Last LDL done 2/2/22  BP not at goal     Amlodipine  Last Written Prescription Date:  2/2/22  Last Fill Quantity: 90,  # refills: 3     Atenolol  Last Written Prescription Date:  2/2/22  Last Fill Quantity: 90,  # refills: 3     Atorvastatin  Last Written Prescription Date:  2/2/22  Last Fill Quantity: 90,  # refills: 3     Hydrochlorothiazide  Last Written Prescription Date:  2/2/22  Last Fill Quantity: 90,  # refills: 3     Losartan  Last Written Prescription Date:  2/2/22  Last Fill Quantity: 90,  # refills: 3     Sertraline  Last Written Prescription Date:  2/2/22  Last Fill Quantity: 90,  # refills: 3     Last office visit provider:  1/3/23     Requested Prescriptions   Pending Prescriptions Disp Refills     amLODIPine (NORVASC) 10 MG tablet [Pharmacy Med Name: amLODIPine Besylate Oral Tablet 10 MG] 90 tablet 0     Sig: TAKE 1 TABLET by mouth EVERY DAY.       Calcium Channel Blockers Protocol  Failed - 3/2/2023  3:53 PM        Failed - Normal serum creatinine on file in past 12 months     Recent Labs   Lab Test 01/03/23  1252   CR 1.40*       Ok to refill medication if creatinine is low          Passed - Blood pressure under 140/90 in past 12 months     BP Readings from Last 3 Encounters:   01/03/23 110/78   12/26/22 (!) 167/93   11/23/22 (!) 127/99                 Passed - Recent (12 mo) or future (30 days) visit within the authorizing provider's specialty     Patient has had an office visit with the authorizing provider or a provider within the authorizing providers department within the previous 12 mos or has a future within next 30 days. See \"Patient Info\" tab in inbasket, or \"Choose Columns\" in Meds & Orders section of the refill encounter.              Passed - Medication is active on med list        Passed - Patient is age 18 or older           atenolol (TENORMIN) 50 MG tablet [Pharmacy " "Med Name: Atenolol Oral Tablet 50 MG] 90 tablet 0     Sig: Take 1 tablet (50 mg) by mouth daily.       Beta-Blockers Protocol Passed - 3/2/2023  3:53 PM        Passed - Blood pressure under 140/90 in past 12 months     BP Readings from Last 3 Encounters:   01/03/23 110/78   12/26/22 (!) 167/93 11/23/22 (!) 127/99                 Passed - Patient is age 6 or older        Passed - Recent (12 mo) or future (30 days) visit within the authorizing provider's specialty     Patient has had an office visit with the authorizing provider or a provider within the authorizing providers department within the previous 12 mos or has a future within next 30 days. See \"Patient Info\" tab in inbasket, or \"Choose Columns\" in Meds & Orders section of the refill encounter.              Passed - Medication is active on med list           hydrochlorothiazide (HYDRODIURIL) 25 MG tablet [Pharmacy Med Name: hydroCHLOROthiazide Oral Tablet 25 MG] 90 tablet 0     Sig: TAKE 1 TABLET BY MOUTH EVERY DAY       Diuretics (Including Combos) Protocol Failed - 3/2/2023  3:53 PM        Failed - Normal serum creatinine on file in past 12 months     Recent Labs   Lab Test 01/03/23  1252   CR 1.40*              Passed - Blood pressure under 140/90 in past 12 months     BP Readings from Last 3 Encounters:   01/03/23 110/78   12/26/22 (!) 167/93 11/23/22 (!) 127/99                 Passed - Recent (12 mo) or future (30 days) visit within the authorizing provider's specialty     Patient has had an office visit with the authorizing provider or a provider within the authorizing providers department within the previous 12 mos or has a future within next 30 days. See \"Patient Info\" tab in inbasket, or \"Choose Columns\" in Meds & Orders section of the refill encounter.              Passed - Medication is active on med list        Passed - Patient is age 18 or older        Passed - Normal serum potassium on file in past 12 months     Recent Labs   Lab Test " "01/03/23  1252   POTASSIUM 3.5                    Passed - Normal serum sodium on file in past 12 months     Recent Labs   Lab Test 01/03/23  1252                    atorvastatin (LIPITOR) 40 MG tablet [Pharmacy Med Name: Atorvastatin Calcium Oral Tablet 40 MG] 90 tablet 0     Sig: Take 1 tablet (40 mg) by mouth daily.       Statins Protocol Failed - 3/2/2023  3:53 PM        Failed - LDL on file in past 12 months     Recent Labs   Lab Test 02/02/22  1042   LDL 68             Passed - No abnormal creatine kinase in past 12 months     No lab results found.             Passed - Recent (12 mo) or future (30 days) visit within the authorizing provider's specialty     Patient has had an office visit with the authorizing provider or a provider within the authorizing providers department within the previous 12 mos or has a future within next 30 days. See \"Patient Info\" tab in inbasket, or \"Choose Columns\" in Meds & Orders section of the refill encounter.              Passed - Medication is active on med list        Passed - Patient is age 18 or older           losartan (COZAAR) 100 MG tablet [Pharmacy Med Name: Losartan Potassium Oral Tablet 100 MG] 90 tablet 0     Sig: TAKE 1 TABLET BY MOUTH EVERY DAY       Angiotensin-II Receptors Failed - 3/2/2023  3:53 PM        Failed - Normal serum creatinine on file in past 12 months     Recent Labs   Lab Test 01/03/23  1252   CR 1.40*       Ok to refill medication if creatinine is low          Passed - Last blood pressure under 140/90 in past 12 months     BP Readings from Last 3 Encounters:   01/03/23 110/78   12/26/22 (!) 167/93   11/23/22 (!) 127/99                 Passed - Recent (12 mo) or future (30 days) visit within the authorizing provider's specialty     Patient has had an office visit with the authorizing provider or a provider within the authorizing providers department within the previous 12 mos or has a future within next 30 days. See \"Patient Info\" tab in " "inbasket, or \"Choose Columns\" in Meds & Orders section of the refill encounter.              Passed - Medication is active on med list        Passed - Patient is age 18 or older        Passed - Normal serum potassium on file in past 12 months     Recent Labs   Lab Test 01/03/23  1252   POTASSIUM 3.5                       sertraline (ZOLOFT) 100 MG tablet [Pharmacy Med Name: Sertraline HCl Oral Tablet 100 MG] 90 tablet 0     Sig: Take 1 tablet (100 mg) by mouth daily.       SSRIs Protocol Passed - 3/2/2023  3:53 PM        Passed - Recent (12 mo) or future (30 days) visit within the authorizing provider's specialty     Patient has had an office visit with the authorizing provider or a provider within the authorizing providers department within the previous 12 mos or has a future within next 30 days. See \"Patient Info\" tab in inbasket, or \"Choose Columns\" in Meds & Orders section of the refill encounter.              Passed - Medication is active on med list        Passed - Patient is age 18 or older             TAWNYA ROSA RN 03/02/23 4:08 PM  "

## 2023-03-09 ENCOUNTER — PRE VISIT (OUTPATIENT)
Dept: UROLOGY | Facility: CLINIC | Age: 68
End: 2023-03-09
Payer: COMMERCIAL

## 2023-03-09 NOTE — TELEPHONE ENCOUNTER
Reason for Visit: Post-op    Diagnosis: BPH    Rooming Requirements: Flow/ PVR ADY Jerome  03/09/23  1:00 PM

## 2023-03-10 ENCOUNTER — OFFICE VISIT (OUTPATIENT)
Dept: UROLOGY | Facility: CLINIC | Age: 68
End: 2023-03-10
Payer: COMMERCIAL

## 2023-03-10 VITALS
HEIGHT: 68 IN | SYSTOLIC BLOOD PRESSURE: 106 MMHG | HEART RATE: 63 BPM | WEIGHT: 185 LBS | BODY MASS INDEX: 28.04 KG/M2 | OXYGEN SATURATION: 95 % | DIASTOLIC BLOOD PRESSURE: 68 MMHG

## 2023-03-10 DIAGNOSIS — R35.0 URINARY FREQUENCY: Primary | ICD-10-CM

## 2023-03-10 PROCEDURE — 99213 OFFICE O/P EST LOW 20 MIN: CPT | Mod: 25 | Performed by: UROLOGY

## 2023-03-10 PROCEDURE — 51741 ELECTRO-UROFLOWMETRY FIRST: CPT | Performed by: UROLOGY

## 2023-03-10 PROCEDURE — 51798 US URINE CAPACITY MEASURE: CPT | Performed by: UROLOGY

## 2023-03-10 RX ORDER — OXYBUTYNIN CHLORIDE 10 MG/1
10 TABLET, EXTENDED RELEASE ORAL DAILY
Qty: 30 TABLET | Refills: 3 | Status: SHIPPED | OUTPATIENT
Start: 2023-03-10 | End: 2024-05-22

## 2023-03-10 ASSESSMENT — PAIN SCALES - GENERAL: PAINLEVEL: MODERATE PAIN (4)

## 2023-03-10 NOTE — NURSING NOTE
"Chief Complaint   Patient presents with     Follow Up     Post-op s/p HoLEP 11/23/22       Blood pressure 106/68, pulse 63, height 1.727 m (5' 8\"), weight 83.9 kg (185 lb), SpO2 95 %. Body mass index is 28.13 kg/m .    Patient Active Problem List   Diagnosis     HTN (hypertension), benign     Insomnia, unspecified type     Depression with anxiety     Hyperlipidemia LDL goal <130     Mild major depression (H)     Stage 3a chronic kidney disease (H)     Cigarette smoker     Albuminuria     Benign prostatic hyperplasia with nocturia       No Known Allergies    Current Outpatient Medications   Medication Sig Dispense Refill     amLODIPine (NORVASC) 10 MG tablet TAKE 1 TABLET by mouth EVERY DAY. 60 tablet 0     atenolol (TENORMIN) 50 MG tablet Take 1 tablet (50 mg) by mouth daily. 60 tablet 0     atorvastatin (LIPITOR) 40 MG tablet Take 1 tablet (40 mg) by mouth daily. 60 tablet 5     clotrimazole (LOTRIMIN) 1 % external cream Apply to affected area BID X 1 month supply 2 g 1     fluticasone (FLONASE) 50 MCG/ACT nasal spray Spray 1 spray in nostril daily (Patient not taking: Reported on 1/3/2023) 16 g 11     hydrochlorothiazide (HYDRODIURIL) 25 MG tablet TAKE 1 TABLET BY MOUTH EVERY DAY 60 tablet 0     losartan (COZAAR) 100 MG tablet TAKE 1 TABLET BY MOUTH EVERY DAY 60 tablet 0     nicotine (NICODERM CQ) 21 MG/24HR 24 hr patch Place 1 patch onto the skin every 24 hours (Patient not taking: Reported on 12/26/2022) 28 patch 2     sertraline (ZOLOFT) 100 MG tablet Take 1 tablet (100 mg) by mouth daily 60 tablet 0     triamcinolone (KENALOG) 0.1 % external ointment APPLY 1 APPLICATION TWICE A DAY AS NEEDED TO BUTTOCK AREA 80 g 3     zolpidem (AMBIEN) 5 MG tablet Take 1 tablet (5 mg) by mouth nightly as needed for sleep Take tablet by mouth 15 minutes prior to sleep, for Sleep Study (Patient not taking: Reported on 12/26/2022) 30 tablet 1       Social History     Tobacco Use     Smoking status: Every Day     Packs/day: 0.50 "     Years: 22.00     Pack years: 11.00     Types: Cigarettes     Start date: 2000     Last attempt to quit: 2022     Years since quittin.3     Smokeless tobacco: Never   Substance Use Topics     Alcohol use: Yes     Alcohol/week: 0.0 standard drinks     Comment: a few beers per day      Drug use: Never       Rolando Wong, ALYSSA  3/10/2023  2:53 PM

## 2023-03-10 NOTE — PROGRESS NOTES
Assessment & Plan   ASSESSMENT and PLAN  67 year old male here for evaluation after HoLEP.  He is experiencing mixed incontinence and urinary frequency.    1.  BPH status post HoLEP  2.  Mixed urinary incontinence    Patient will work on Kegels to improve his stress urinary continence.  To help with the urge component, discussed modifying tobacco and alcohol intake.  Also will try either mirabegron or tolterodine, depending on which his insurance will cover.  Discussed side effects of tolterodine being dry mouth and constipation.  Side effects mirabegron being an increase in blood pressure.    He will take 1 of these medications for a month and report back on his symptoms.  If his symptoms get worse or do not improve, we will consider performing a cystoscopy to rule out bladder neck contracture.    Time spent: 20 minutes spent on the date of the encounter doing chart review, history and exam, documentation and further activities as noted above.    Elio Nicole MD   Urology  [facility]         Subjective     CHIEF COMPLAINT   It was my pleasure to see Ming Garnett  who is a 67 year old male for follow-up of HoLEP.      HPI   Ming Garnett is a very pleasant 67 year old male who underwent a HoLEP on 11/23/2022.  His pathology was 13.2 g of benign tissue.  He was discharged same day with a catheter and had catheter removal on POD2 without retention.    Postoperatively, he did have any unplanned visits, ER visits, hospitalizations or unplanned procedures.    Currently, he is having mixed incontinence.  Is smoking and has a few beers in the evening, but no change from preop.  Leakage with coughing and activity.  Some daytime urgency with incontinence less at night.  Does not feel like he is emptying his bladder.    PVR 1 mL  AUA score 21  QOL score 4 (mostly disappointed)         Objective     PHYSICAL EXAM  Patient is a 67 year old  male   Vitals: Blood pressure 106/68, pulse 63, height  "1.727 m (5' 8\"), weight 83.9 kg (185 lb), SpO2 95 %.  Body mass index is 28.13 kg/m .  Gen: no acute distress         " Former smoker

## 2023-03-10 NOTE — LETTER
3/10/2023       RE: Ming Garnett  [address] [place] [address]     Dear Colleague,    Thank you for referring your patient, Ming Garnett, to the [facility] [place] [facility]. Please see a copy of my visit note below.    Assessment & Plan   ASSESSMENT and PLAN  67 year old male here for evaluation after HoLEP.  He is experiencing mixed incontinence and urinary frequency.    1.  BPH status post HoLEP  2.  Mixed urinary incontinence    Patient will work on Kegels to improve his stress urinary continence.  To help with the urge component, discussed modifying tobacco and alcohol intake.  Also will try either mirabegron or tolterodine, depending on which his insurance will cover.  Discussed side effects of tolterodine being dry mouth and constipation.  Side effects mirabegron being an increase in blood pressure.    He will take 1 of these medications for a month and report back on his symptoms.  If his symptoms get worse or do not improve, we will consider performing a cystoscopy to rule out bladder neck contracture.    Time spent: 20 minutes spent on the date of the encounter doing chart review, history and exam, documentation and further activities as noted above.    Elio Nicole MD   Urology  [facility]        Subjective      CHIEF COMPLAINT   It was my pleasure to see Ming Garnett  who is a 67 year old male for follow-up of HoLEP.      HPI   Ming Garnett is a very pleasant 67 year old male who underwent a HoLEP on 11/23/2022.  His pathology was 13.2 g of benign tissue.  He was discharged same day with a catheter and had catheter removal on POD2 without retention.    Postoperatively, he did have any unplanned visits, ER visits, hospitalizations or unplanned procedures.    Currently, he is having mixed incontinence.  Is smoking and has a few beers in the evening, but no change from preop.   "Leakage with coughing and activity.  Some daytime urgency with incontinence less at night.  Does not feel like he is emptying his bladder.    PVR 1 mL  AUA score 21  QOL score 4 (mostly disappointed)        Objective      PHYSICAL EXAM  Patient is a 67 year old  male   Vitals: Blood pressure 106/68, pulse 63, height 1.727 m (5' 8\"), weight 83.9 kg (185 lb), SpO2 95 %.  Body mass index is 28.13 kg/m .  Gen: no acute distress    "

## 2023-03-10 NOTE — PATIENT INSTRUCTIONS
"Oxybutynin has side effects of dry mouth and constipation.    Go to Youtube and look up \"male kegels exercises\" and click on the video entitled \"Kegel Exercises for Men - Beginners Pelvic Floor Strengthening Guide\" by Marivel Rincon.    Send me a [employer]eraView[employer] message in a month    "

## 2023-05-06 ENCOUNTER — HEALTH MAINTENANCE LETTER (OUTPATIENT)
Age: 68
End: 2023-05-06

## 2023-05-12 ENCOUNTER — OFFICE VISIT (OUTPATIENT)
Dept: FAMILY MEDICINE | Facility: CLINIC | Age: 68
End: 2023-05-12
Payer: COMMERCIAL

## 2023-05-12 VITALS
BODY MASS INDEX: 32.37 KG/M2 | SYSTOLIC BLOOD PRESSURE: 102 MMHG | RESPIRATION RATE: 16 BRPM | DIASTOLIC BLOOD PRESSURE: 60 MMHG | HEIGHT: 63 IN | WEIGHT: 182.7 LBS | OXYGEN SATURATION: 97 % | TEMPERATURE: 98.6 F

## 2023-05-12 DIAGNOSIS — M17.0 PRIMARY OSTEOARTHRITIS OF BOTH KNEES: ICD-10-CM

## 2023-05-12 DIAGNOSIS — M25.561 CHRONIC PAIN OF BOTH KNEES: ICD-10-CM

## 2023-05-12 DIAGNOSIS — E78.5 HYPERLIPIDEMIA LDL GOAL <130: ICD-10-CM

## 2023-05-12 DIAGNOSIS — Z12.5 SCREENING FOR PROSTATE CANCER: ICD-10-CM

## 2023-05-12 DIAGNOSIS — F17.200 NICOTINE DEPENDENCE, UNCOMPLICATED, UNSPECIFIED NICOTINE PRODUCT TYPE: ICD-10-CM

## 2023-05-12 DIAGNOSIS — G89.29 CHRONIC PAIN OF BOTH KNEES: ICD-10-CM

## 2023-05-12 DIAGNOSIS — Z00.00 ENCOUNTER FOR MEDICARE ANNUAL WELLNESS EXAM: Primary | ICD-10-CM

## 2023-05-12 DIAGNOSIS — F51.01 PRIMARY INSOMNIA: ICD-10-CM

## 2023-05-12 DIAGNOSIS — G47.33 OSA (OBSTRUCTIVE SLEEP APNEA): ICD-10-CM

## 2023-05-12 DIAGNOSIS — I10 HTN (HYPERTENSION), BENIGN: ICD-10-CM

## 2023-05-12 DIAGNOSIS — Z23 NEED FOR SHINGLES VACCINE: ICD-10-CM

## 2023-05-12 DIAGNOSIS — Z72.0 TOBACCO ABUSE: ICD-10-CM

## 2023-05-12 DIAGNOSIS — Z12.11 SCREEN FOR COLON CANCER: ICD-10-CM

## 2023-05-12 DIAGNOSIS — M25.562 CHRONIC PAIN OF BOTH KNEES: ICD-10-CM

## 2023-05-12 PROCEDURE — G0103 PSA SCREENING: HCPCS | Performed by: NURSE PRACTITIONER

## 2023-05-12 PROCEDURE — 36415 COLL VENOUS BLD VENIPUNCTURE: CPT | Performed by: NURSE PRACTITIONER

## 2023-05-12 PROCEDURE — G0439 PPPS, SUBSEQ VISIT: HCPCS | Performed by: NURSE PRACTITIONER

## 2023-05-12 PROCEDURE — 80061 LIPID PANEL: CPT | Performed by: NURSE PRACTITIONER

## 2023-05-12 PROCEDURE — 80048 BASIC METABOLIC PNL TOTAL CA: CPT | Performed by: NURSE PRACTITIONER

## 2023-05-12 PROCEDURE — 99215 OFFICE O/P EST HI 40 MIN: CPT | Mod: 25 | Performed by: NURSE PRACTITIONER

## 2023-05-12 RX ORDER — ZOLPIDEM TARTRATE 5 MG/1
2.5 TABLET ORAL
Qty: 30 TABLET | Refills: 1 | Status: SHIPPED | OUTPATIENT
Start: 2023-05-12 | End: 2023-08-24

## 2023-05-12 ASSESSMENT — ENCOUNTER SYMPTOMS
HEARTBURN: 0
EYE PAIN: 0
NAUSEA: 0
ABDOMINAL PAIN: 0
NERVOUS/ANXIOUS: 1
DIZZINESS: 0
HEMATURIA: 0
DIARRHEA: 0
HEMATOCHEZIA: 0
CONSTIPATION: 0
DYSURIA: 0
ARTHRALGIAS: 1
CHILLS: 0
FEVER: 0
PARESTHESIAS: 0
SORE THROAT: 0
COUGH: 0
PALPITATIONS: 0
HEADACHES: 0
SHORTNESS OF BREATH: 0
WEAKNESS: 0
JOINT SWELLING: 0
MYALGIAS: 0
FREQUENCY: 0

## 2023-05-12 ASSESSMENT — ACTIVITIES OF DAILY LIVING (ADL): CURRENT_FUNCTION: NO ASSISTANCE NEEDED

## 2023-05-12 NOTE — PATIENT INSTRUCTIONS
Patient Education   Personalized Prevention Plan  You are due for the preventive services outlined below.  Your care team is available to assist you in scheduling these services.  If you have already completed any of these items, please share that information with your care team to update in your medical record.  Health Maintenance Due   Topic Date Due     Colorectal Cancer Screening  03/04/2022     Zoster (Shingles) Vaccine (2 of 2) 03/21/2022     Cholesterol Lab  02/02/2023     COVID-19 Vaccine (4 - Additional dose for Nicolas series) 03/08/2023       Exercise for a Healthier Heart  You may wonder how you can improve the health of your heart. If you re thinking about exercise, you re on the right track. You don t need to become an athlete. But you do need a certain amount of brisk exercise to help strengthen your heart. If you have been diagnosed with a heart condition, your healthcare provider may advise exercise to help your condition. To help make exercise a habit, choose safe, fun activities.      Exercise with a friend. When activity is fun, you're more likely to stick with it.     Before you start  Check with your healthcare provider before starting an exercise program. This is especially important if you haven't been active for a while. It's also important if you have a long-term (chronic) health problem such as heart disease, diabetes, or obesity. Also check with your provider if you're at high risk for having these problems.   Why exercise?  Exercising regularly offers many healthy rewards. It can help you do all of these:     Improve your blood cholesterol level to help prevent further heart trouble.    Lower your blood pressure to help prevent a stroke or heart attack.    Control diabetes or reduce your risk of getting this disease.    Improve your heart and lung function.    Reach and stay at a healthy weight.    Make your muscles stronger so you can stay active.    Prevent falls and fractures by  slowing the loss of bone mass (osteoporosis).    Manage stress better.    Improve your sense of self and your body image.  Exercise tips      Ease into your routine. Set small goals. Then build on them. Talk with your healthcare provider first before starting an exercise routine if you're not sure what your activity level should be.    Exercise on most days. Aim for a total of at least 150 minutes (2 hours and 30 minutes) or more of moderate-intensity aerobic activity each week. You could also do 75 minutes (1 hour and 15 minutes) or more of vigorous-intensity aerobic activity each week. Or try for a combination of both. Moderate activity means that you breathe heavier and your heart rate increases, but you can still talk. Think about doing at least 30 minutes of moderate exercise, 5 times a week. It's OK to work up to the 30-minute period over time. Examples of moderate-intensity activity are brisk walking, gardening, and water aerobics.    Step up your daily activity level.  Along with your exercise program, try being more active the whole day. Walk instead of drive. Or park further away so that you take more steps each day. Do more household tasks or yard work. You may not be able to meet the advised amount of physical activity. But doing some moderate- or vigorous-intensity aerobic activity can help reduce your risk for heart disease. Your healthcare provider can help you figure out what is best for you.    Choose 1 or more activities you enjoy.  Walking is one of the easiest things you can do. You can also try swimming, riding a bike, dancing, or taking an exercise class.    Call 911  Call 911 right away if any of these occur:     Chest pain that doesn't go away quickly with rest    New burning, tightness, pressure, or heaviness in your chest, neck, shoulders, back, or arms    Abnormal or severe shortness of breath    A very fast or irregular heartbeat (palpitations)    Fainting  When to call your healthcare  provider  Call your healthcare provider if you have any of these:     Dizziness or lightheadedness    Mild shortness of breath or chest pain    Increased or new joint or muscle pain    [employer] last reviewed this educational content on 7/1/2022 2000-2022 [employer]. All rights reserved. This information is not intended as a substitute for professional medical care. Always follow your healthcare professional's instructions.         Patient Education   Alcohol Use     Many people can enjoy a glass of wine or beer without any negative consequences to their health. According to the Centers for Disease Control and Prevention (CDC), having one or fewer drinks per day for women and two or fewer per day for men is considered moderate drinking.     When people drink more than moderately, it can become concerning. Excessive drinking is defined as consuming 15 drinks or more per week for men and 8 drinks or more per week for women. There are various health problems associated with excessive drinking, which include:    Damage to vital organs like the heart, brain, liver and pancreas    Harm to the digestive tract    Weaken the immune system    Higher risk for heart disease and cancer    There are many resources available to people who are addicted to alcohol. A counselor or other health care provider can give you support. So can a , , or rabbi who is trained in substance abuse counseling. Friends and family may also help once you are connected with professionals.           Urinary Incontinence (Male)  We understand that gender is a spectrum. We may use gendered terms to talk about anatomy and health risk. Please use this sheet in a way that works best for you and your provider as you talk about your care.     Urinary incontinence means not being able to control the release of urine from the bladder.   Causes  Common causes of urinary incontinence in men include:    Infection    Certain  medicines    Aging    Poor pelvic muscle tone    Bladder spasms    Obesity    Enlarged prostate    Trouble urinating and fully emptying the bladder (urinary retention)  Other things that can cause incontinence are:     Nervous system diseases    Diabetes    Sleep apnea    Urinary tract infections    Prostate surgery    Pelvic injury  Constipation and smoking have also been identified as risk factors.   Symptoms    Urge incontinence (overactive bladder). This is a sudden urge to urinate. It occurs even though there may not be much urine in the bladder. The need to urinate often during the night is common. It's due to overactive bladder muscles.    Stress incontinence. This is urine leakage that you can't control. It can occur with sneezing, coughing, and other actions that put stress on the bladder.    Treatment  Treatment depends on what is causing the condition. Bladder infections are treated with antibiotics. Urinary retention is treated with a bladder catheter.   Home care  Follow these guidelines when caring for yourself at home:    Don't have any foods and drinks that may irritate the bladder. This includes:  ? Chocolate  ? Alcohol  ? Caffeine  ? Carbonated drinks  ? Acidic fruits and juices    Limit fluids to 6 to 8 cups a day.    Lose weight if you are overweight. This may reduce your symptoms.    If advised, do regular pelvic muscle-strengthening exercises such as Kegel exercises.    If needed, wear absorbent pads to catch urine. Change the pads often. This is for good hygiene and to prevent skin and bladder infections.    Bathe daily for good hygiene.    If an antibiotic was prescribed to treat a bladder infection, take it until it's finished. Keep taking it even if you are feeling better. This is to make sure your infection has cleared.    If a catheter was left in place, keep bacteria from getting into the collection bag. Don't disconnect the catheter from the collection bag.    Use a leg band to secure  the catheter drainage tube, so it does not pull on the catheter. Drain the collection bag when it becomes full. To do this, use the drain spout at the bottom of the bag. Don't disconnect the bag from the catheter.    Don't pull on or try to remove a catheter. The catheter must be removed by a healthcare provider.    If you smoke, stop. Ask your provider for help if you can't do this on your own.  Follow-up care  Follow up with your healthcare provider, or as advised.  When to get medical advice  Call your healthcare provider right away if any of these occur:     Fever over 100.4 F (38 C), or as directed by your provider    Bladder pain or fullness    Belly swelling, nausea, or vomiting    Back pain    Weakness, dizziness, or fainting    If a catheter was left in place, return if:  ? The catheter falls out  ? The catheter stops draining for 6 hours  ? Your urine gets cloudy or smells bad  [employer] last reviewed this educational content on 6/1/2022 2000-2022 [employer]. All rights reserved. This information is not intended as a substitute for professional medical care. Always follow your healthcare professional's instructions.          Preventing Falls at Home  A person can fall for many reasons. Older adults may fall because reaction time slows down as we age. Your muscles and joints may get stiff, weak, or less flexible because of illness, medicines, or a physical condition.   Other health problems that make falls more likely include:     Arthritis    Dizziness or lightheadedness when you stand up (orthostatic hypotension)    History of a stroke    Dizziness    Anemia    Certain medicines taken for mental illness or to control blood pressure.    Problems with balance or gait    Bladder or urinary problems    History of falling    Changes in vision (vision impairment)    Changes in thinking skills and memory (cognitive impairment)  Injuries from a fall can include serious injuries such as broken  bones, dislocated joints, internal bleeding and cuts. Injuries like these can limit your independence.   Prevention tips  To help prevent falls and fall-related injuries, follow the tips below.    Floors  To make floors safer:     Put nonskid pads under area rugs.    Remove small rugs.    Replace worn floor coverings.    Tack carpets firmly to each step on carpeted stairs. Put nonskid strips on the edges of uncarpeted stairs.    Keep floors and stairs free of clutter and cords.    Arrange furniture so there are clear pathways.    Clean up any spills right away.  Bathrooms    To make bathrooms safer:     Install grab bars in the tub or shower.    Apply nonskid strips or put a nonskid rubber mat in the tub or shower.    Sit on a bath chair to bathe.    Use bathmats with nonskid backing.  Lighting  To improve visibility in your home:      Keep a flashlight in each room. Or put a lamp next to the bed within easy reach.    Put nightlights in the bedrooms, hallways, kitchen, and bathrooms.    Make sure all stairways have good lighting.    Take your time when going up and down stairs.    Put handrails on both sides of stairs and in walkways for more support. To prevent injury to your wrist or arm, don t use handrails to pull yourself up.    Install grab bars to pull yourself up.    Move or rearrange items that you use often. This will make them easier to find or reach.    Look at your home to find any safety hazards. Especially look at doorways, walkways, and the driveway. Remove or repair any safety problems that you find.  Other changes to make    Look around to find any safety hazards. Look closely at doorways, walkways, and the driveway. Remove or repair any safety problems that you find.    Wear shoes that fit well.    Take your time when going up and down stairs.    Put handrails on both sides of stairs and in walkways for more support. To prevent injury to your wrist or arm, don t use handrails to pull yourself  "up.    Install grab bars wherever needed to pull yourself up.    Arrange items that you use often. This will make them easier to find or reach.    [employer] last reviewed this educational content on 3/1/2020    [id number] [employer]. All rights reserved. This information is not intended as a substitute for professional medical care. Always follow your healthcare professional's instructions.             How to Quit Smoking  Smoking is a hard habit to break. About half of all people who've ever smoked have been able to quit. Most people who still smoke want to quit. Here are some of the best ways to stop smoking.     Keep in mind the health benefits of quitting  The health benefits of quitting start right away. They keep improving the longer you go without smoking. Knowing this can help inspire you to stay on track. These benefits occur at any age. If you're 17 or 70, quitting is a good choice. Some of the health benefits after your last cigarette include:     After 20 minutes:  Your blood pressure and pulse return to normal.    After 8 hours: Your oxygen levels return to normal.    After 2 days: Your ability to smell and taste start to improve as damaged nerves regrow.    After 2 to 3 weeks: Your circulation and lung function improve.    After 1 to 9 months: Your coughing, congestion, and shortness of breath decrease. Your tiredness decreases.    After 1 year: Your risk of heart attack decreases by 50%.    After 5 years: Your risk of lung cancer decreases by 50%. Your risk of stroke becomes the same as a nonsmoker s.  What about going cold turkey?  You may have heard about quitting \"cold turkey.\" This means stopping all at once. Going cold turkey is an option. But it's not the most successful way to quit smoking. Also, trying to cut back slowly often doesn't work as well. This may be because it continues the habit of smoking. You may also inhale more smoke while smoking fewer cigarettes. This leads to " the same amount of nicotine in your body.   But quitting cold turkey or cutting back slowly aren't your only choices. Using tobacco cessation medicines with behavioral counseling may be a better option to help you succeed. Talk with your provider about your options for support while you quit smoking.   Get support  Support programs can be a big help, especially for heavy smokers. These groups offer information, ways to change behavior, and peer support. Ask your provider for some resources. Here are some other ways to find support:     Smokefree.gov at www.smokefree.gov  800-QUIT-NOW (376-619-9093)    American Lung Association at www.lung.org/quit-smoking  654.507.6402    American Cancer Society  at www.cancer.org/quitsmoking  770.219.5177  Support at home is important too. Family and friends can offer praise and reassurance. Ask your friends who smoke to support your decision. Try to stay away from situations that trigger the desire to smoke. This may include smoking with your morning coffee. Or smoking after a meal. Create new routines to help decrease your cravings.   If the smoker in your life finds it hard to quit, encourage them to keep trying. Remind them of all of the benefits to themselves and people they love.   Try over-the-counter nicotine replacements   Nicotine replacement therapy may make it easier to quit. You can buy some aids without a prescription. These include a nicotine patch, gum, and lozenges. But it's best to use these under the care of your healthcare provider. The skin patch gives a steady supply of nicotine. Nicotine gum and lozenges give short-time doses of low levels of nicotine. Both methods reduce the craving for cigarettes. If you have upset stomach (nausea), vomiting, dizziness, weakness, or a fast heartbeat, stop using these products and see your provider.   Ask about prescription medicine  After reviewing your smoking patterns and past attempts to quit, your provider may offer a  prescription medicine. These include bupropion, varenicline, a nicotine inhaler, or nasal spray. Each has advantages and side effects. Your provider can go over these with you.   Keep trying  Most smokers try to quit many times before they succeed. It s important not to give up.   [employer] last reviewed this educational content on 12/1/2019 2000-2022 [employer]. All rights reserved. This information is not intended as a substitute for professional medical care. Always follow your healthcare professional's instructions.          Getting Support for Quitting Smoking  You don t have to go through the process of quitting smoking without support. Tell people you are quitting. The support of friends, coworkers, and family members can make a big difference. Face-to-face or telephone counseling can also be helpful, as can a stop-smoking class or an ex-smokers  group.     Set a quit date  If you re serious about quitting smoking, choose a specific quit date within the next 2 to 4 weeks. Quintin it in bright, bold letters on a calendar you use often. Tell people about your quit date. Ask for their support. Let your friends, coworkers, and family know how they can help you quit.   Make a contract  A quit-smoking contract gives you a goal. Write out the contract and sign it. Have it witnessed, if you like. Then keep the contract where you ll see it often, or carry it with you. Read the contract when you re tempted to smoke.   Take action  On the day you quit, reread your quit contract. Think about the benefits you gain by quitting, such as better health and an improved sense of taste, as well as the money you will save from not smoking. Also:     Remove cigarettes from your home, car, or any other place where you stash them.    Throw away all smoking materials, including matches, lighters, and ashtrays.    Review your list of triggers and your plan for coping with each of them.    Stay away from people or  settings you link with smoking.    Make a quit kit that includes gum, mints, carrot sticks, and things to keep your hands and mouth busy.    Talk to your healthcare provider about using quit-smoking products, such as medicine or a nicotine patch, inhaler, nasal spray, gum, or lozenges.  Ask for help  Sometimes you may just need to talk when you miss smoking. Ex-smokers are good to talk to, because they re likely to know how you feel. You may need extra support in the first few weeks after you quit. Ask a friend to call you each day to see how you re doing. Telephone counseling can also help you keep on track. Ask your healthcare provider, local hospital, or public health department to put you in touch with a phone counselor. You may also have to deal with doubters when you decide to quit. Explain to any doubters why you are quitting. Tell them that quitting is important to you. Ask for their support.   Tell your smoking buddies that you can walk together instead of smoking together. If someone thinks you won t succeed, say that you have a good quit plan and ask for their support. Let them know you re sticking with it. If they can't give you support, consider limiting contact with them for a while.   Stay positive, and if you slip, start again. Quitting smoking often requires repeated attempts. But smokers do quit for good. It's hard, but with determination and support, you can do it.   To learn more  Get more tips from these resources:    CDC at www.cdc.gov/tobacco/quit_smoking  or 800-QUIT-NOW (252-663-2099)    [employer] [place] at www.smokefree.gov  or 877-44U-QUIT (175-392-6373)    American Lung Association at www.lung.org/stop-smoking  or 800-LUNGUSA (417-383-3043)  [employer] last reviewed this educational content on 1/1/2022 2000-2022 [employer]. All rights reserved. This information is not intended as a substitute for professional medical care. Always follow your healthcare  professional's instructions.

## 2023-05-12 NOTE — PROGRESS NOTES
"SUBJECTIVE:   Ming is a 67 year old who presents for Preventive Visit.      5/12/2023     8:52 AM   Additional Questions   Roomed by ILIANA Ramey   Accompanied by n/a         5/12/2023     8:52 AM   Patient Reported Additional Medications   Patient reports taking the following new medications none   Patient has been advised of split billing requirements and indicates understanding: Yes  Are you in the first 12 months of your Medicare coverage?  No    Healthy Habits:     In general, how would you rate your overall health?  Good    Frequency of exercise:  1 day/week    Duration of exercise:  Less than 15 minutes    Do you usually eat at least 4 servings of fruit and vegetables a day, include whole grains    & fiber and avoid regularly eating high fat or \"junk\" foods?  Yes    Taking medications regularly:  Yes    Medication side effects:  None    Ability to successfully perform activities of daily living:  No assistance needed    Home Safety:  No safety concerns identified    Hearing Impairment:  No hearing concerns    In the past 6 months, have you been bothered by leaking of urine? Yes    In general, how would you rate your overall mental or emotional health?  Excellent      PHQ-2 Total Score: 0    Additional concerns today:  No    History of sleep apnea-he is wanting to try an implantable device that has been newly advertised  Difficulty falling asleep and staying asleep - no current machine - couldn't keep mask on (multiple different types), has tried an appliance and this didn't help   Takes a lot of naps - would like to explore the impantable device option  -Patient previously on Ambien and would like a refill of this    Patient with osteoarthritis of both knees -has chronic pain and does have bone-on-bone.  Did he see a specialist in the past who recommended surgery however patient does not feel comfortable with this.  Would like an up to date handicap sticker. Can walk one 1 block  BL osteo arthritis     Smoking " 1 PPD of self rolled has tried NicoDerm patches    Have you ever done Advance Care Planning? (For example, a Health Directive, POLST, or a discussion with a medical provider or your loved ones about your wishes): No, advance care planning information given to patient to review.  Patient plans to discuss their wishes with loved ones or provider.         Fall risk  Fallen 2 or more times in the past year?: Yes  Any fall with injury in the past year?: No  Referral made to Ortho  Cognitive Screening   1) Repeat 3 items (Leader, Season, Table)    2) Clock draw: NORMAL  3) 3 item recall: Recalls 3 objects  Results: NORMAL clock, 1-2 items recalled: COGNITIVE IMPAIRMENT LESS LIKELY    Mini-CogTM Copyright S Nancy. Licensed by the author for use in [facility]; reprinted with permission (greg@[facility]). All rights reserved.      Do you have sleep apnea, excessive snoring or daytime drowsiness?: no    Reviewed and updated as needed this visit by clinical staff   Tobacco  Allergies  Meds  Problems  Med Hx  Surg Hx  Fam Hx          Reviewed and updated as needed this visit by Provider   Tobacco  Allergies  Meds  Problems  Med Hx  Surg Hx  Fam Hx         Social History     Tobacco Use     Smoking status: Every Day     Packs/day: 0.50     Years: 22.00     Pack years: 11.00     Types: Cigarettes     Start date: 2000     Last attempt to quit: 2022     Years since quittin.5     Smokeless tobacco: Never   Vaping Use     Vaping status: Not on file   Substance Use Topics     Alcohol use: Yes     Alcohol/week: 0.0 standard drinks of alcohol     Comment: a few beers per day            2023     9:05 AM   Alcohol Use   Prescreen: >3 drinks/day or >7 drinks/week? Yes     Do you have a current opioid prescription? No  Do you use any other controlled substances or medications that are not prescribed by a provider? None          Current providers sharing in care for this patient include:  Patient  Care Team:  Nicol Corrigan APRN CNP as PCP - General (Family Medicine)  Amira Rosado PA-C as Physician Assistant (Physician Assistant)  Nina Adorno MD as Referring Physician (Family Medicine)  Estrella Brewer CNP as Nurse Practitioner (Urology)  Krystina Flynn, RN as Specialty Care Coordinator (Urology)  Cong Ochoa APRN CNP as Assigned PCP  Elio Nicole MD as Assigned Surgical Provider    The following health maintenance items are reviewed in Epic and correct as of today:  Health Maintenance   Topic Date Due     COLORECTAL CANCER SCREENING  03/04/2022     ZOSTER IMMUNIZATION (2 of 2) 03/21/2022     LIPID  02/02/2023     COVID-19 Vaccine (4 - Additional dose for Nicolas series) 03/08/2023     PHQ-9  07/03/2023     MICROALBUMIN  11/08/2023     LUNG CANCER SCREENING  11/08/2023     BMP  01/03/2024     HEMOGLOBIN  01/03/2024     NICOTINE/TOBACCO CESSATION COUNSELING Q 1 YR  05/12/2024     MEDICARE ANNUAL WELLNESS VISIT  05/12/2024     ANNUAL REVIEW OF HM ORDERS  05/12/2024     FALL RISK ASSESSMENT  05/12/2024     ADVANCE CARE PLANNING  05/12/2028     DTAP/TDAP/TD IMMUNIZATION (3 - Td or Tdap) 04/24/2029     HEPATITIS C SCREENING  Completed     DEPRESSION ACTION PLAN  Completed     INFLUENZA VACCINE  Completed     Pneumococcal Vaccine: 65+ Years  Completed     URINALYSIS  Completed     AORTIC ANEURYSM SCREENING (SYSTEM ASSIGNED)  Completed     IPV IMMUNIZATION  Aged Out     MENINGITIS IMMUNIZATION  Aged Out               Review of Systems   Constitutional: Negative for chills and fever.   HENT: Positive for hearing loss. Negative for congestion, ear pain and sore throat.    Eyes: Negative for pain and visual disturbance.   Respiratory: Negative for cough and shortness of breath.    Cardiovascular: Negative for chest pain, palpitations and peripheral edema.   Gastrointestinal: Negative for abdominal pain, constipation, diarrhea, heartburn, hematochezia and nausea.  "  Genitourinary: Positive for impotence and urgency. Negative for dysuria, frequency, genital sores, hematuria and penile discharge.   Musculoskeletal: Positive for arthralgias. Negative for joint swelling and myalgias.   Skin: Negative for rash.   Neurological: Negative for dizziness, weakness, headaches and paresthesias.   Psychiatric/Behavioral: Negative for mood changes. The patient is nervous/anxious.          OBJECTIVE:   /60 (BP Location: Left arm)   Temp 98.6  F (37  C) (Oral)   Resp 16   Ht 1.6 m (5' 3\")   Wt 82.9 kg (182 lb 11.2 oz)   SpO2 97%   BMI 32.36 kg/m   Estimated body mass index is 32.36 kg/m  as calculated from the following:    Height as of this encounter: 1.6 m (5' 3\").    Weight as of this encounter: 82.9 kg (182 lb 11.2 oz).  Physical Exam  GENERAL: healthy, alert and no distress, elderly man  NECK: no adenopathy, no asymmetry, masses, or scars and thyroid normal to palpation  RESP: lungs clear to auscultation - no rales, rhonchi or wheezes  CV: regular rate and rhythm, normal S1 S2, no S3 or S4, no murmur, click or rub, no peripheral edema and peripheral pulses strong  ABDOMEN: soft, nontender, no hepatosplenomegaly, no masses and bowel sounds normal  MS: Gait is slow but steady, slow to get up from sitting position        ASSESSMENT / PLAN:   Ming was seen today for physical.    Diagnoses and all orders for this visit:    Encounter for Medicare annual wellness exam  -     PRIMARY CARE FOLLOW-UP SCHEDULING; Future  We discussed healthy lifestyle, nutrition, cardiovascular risk reduction, self care, safety, sunscreen, and timing of cancer screening.  Health maintenance screening and immunizations reviewed with the patient.  Follow up yearly for the annual physical.    Need for shingles vaccine  Discussed need for shingles vaccine.  Recommend the patient get this done at a pharmacy  Discussed efficacy of this vaccine    Screen for colon cancer  -     Colonoscopy Screening "  Referral; Future    Hyperlipidemia LDL goal <130  -     Lipid panel reflex to direct LDL Non-fasting; Future  -     Lipid panel reflex to direct LDL Non-fasting  Fasting    Primary osteoarthritis of both knees  -     Physical Therapy Referral; Future  -     Orthopedic  Referral; Future  Discussed with patient that there may be other nonsurgical options, also given his chronic pain and length of time its been since he had a consult he may be a good candidate for knee replacement    Patient today is requesting a scooter for long distances and long period of time that he would like to stand outside. -He is able to walk 1 block before needing to rest and having severe pain in his knees -referral placed for PT for scooter assessment    Chronic pain of both knees  -     Physical Therapy Referral; Future  -     Orthopedic  Referral; Future  See above    Primary insomnia  -     Adult Sleep Eval & Management  Referral; Future  -     zolpidem (AMBIEN) 5 MG tablet; Take 0.5 tablets (2.5 mg) by mouth nightly as needed for sleep Take tablet by mouth 15 minutes prior to sleep  Discussed that Ambien may worsen his obstructive sleep apnea  Referral placed to sleep medicine to discuss options for implant versus other options for sleep apnea.  Highly recommend being treated for this as he does have severe sleep apnea and chronic hypoxia can lead to worsening cardiovascular symptoms    HTN (hypertension), benign  -     Basic metabolic panel  (Ca, Cl, CO2, Creat, Gluc, K, Na, BUN); Future  -     Basic metabolic panel  (Ca, Cl, CO2, Creat, Gluc, K, Na, BUN)  Blood pressure is excellent today therefore we will discontinue amlodipine  Patient to follow-up in 2 months if blood pressure continues to be lower than 120/80 consider decreasing another blood pressure medication    JAZZY (obstructive sleep apnea)  -     Adult Sleep Eval & Management  Referral; Future  See above    Screening for prostate  "cancer  -     PSA, screen; Future  -     PSA, screen    Tobacco abuse  -     [place] Quit Partner Referral; Future  Patient open to quitting however does not want to have nicotine patches or nicotine replacement therapy or Chantix at this time    Nicotine dependence, uncomplicated, unspecified nicotine product type  -     [place] Quit Partner Referral; Future    Other orders  -     REVIEW OF HEALTH MAINTENANCE PROTOCOL ORDERS  -     PRIMARY CARE FOLLOW-UP SCHEDULING; Future    Handicap form completed to be good through 2027    Spent 50mins beyond the preventative visit doing chart review, history and exam, patient education, documentation and further activities for an acute concern per the note.        Patient has been advised of split billing requirements and indicates understanding: Yes      Stopping amlodipine      COUNSELING:  Reviewed preventive health counseling, as reflected in patient instructions       Regular exercise       Healthy diet/nutrition      BMI:   Estimated body mass index is 32.36 kg/m  as calculated from the following:    Height as of this encounter: 1.6 m (5' 3\").    Weight as of this encounter: 82.9 kg (182 lb 11.2 oz).   Weight management plan: Discussed healthy diet and exercise guidelines      He reports that he has been smoking cigarettes. He started smoking about 23 years ago. He has a 11.00 pack-year smoking history. He has never used smokeless tobacco.  Nicotine/Tobacco Cessation Plan:   Phone counseling: Place order for Quit Partner Referral      Appropriate preventive services were discussed with this patient, including applicable screening as appropriate for cardiovascular disease, diabetes, osteopenia/osteoporosis, and glaucoma.  As appropriate for age/gender, discussed screening for colorectal cancer, prostate cancer, breast cancer, and cervical cancer. Checklist reviewing preventive services available has been given to the patient.    Reviewed patients plan of care and provided an AVS. " The Intermediate Care Plan ( asthma action plan, low back pain action plan, and migraine action plan) for Ming meets the Care Plan requirement. This Care Plan has been established and reviewed with the Patient.          CORNELIO Bear CNP [facility]    Identified Health Risks:    I have reviewed Opioid Use Disorder and Substance Use Disorder risk factors and made any needed referrals.       He is at risk for lack of exercise and has been provided with information to increase physical activity for the benefit of his well-being.  The patient reports that he drinks more than one alcoholic drink per day and sometimes engages in binge or excessive drinking. He was counseled and given information about possible harmful effects of excessive alcohol intake as well as where to get help for alcohol problems.  Information on urinary incontinence and treatment options given to patient.  He is at risk for falling and has been provided with information to reduce the risk of falling at home.

## 2023-05-13 LAB
ANION GAP SERPL CALCULATED.3IONS-SCNC: 16 MMOL/L (ref 7–15)
BUN SERPL-MCNC: 22.3 MG/DL (ref 8–23)
CALCIUM SERPL-MCNC: 10.1 MG/DL (ref 8.8–10.2)
CHLORIDE SERPL-SCNC: 100 MMOL/L (ref 98–107)
CHOLEST SERPL-MCNC: 142 MG/DL
CREAT SERPL-MCNC: 1.41 MG/DL (ref 0.67–1.17)
DEPRECATED HCO3 PLAS-SCNC: 21 MMOL/L (ref 22–29)
GFR SERPL CREATININE-BSD FRML MDRD: 54 ML/MIN/1.73M2
GLUCOSE SERPL-MCNC: 95 MG/DL (ref 70–99)
HDLC SERPL-MCNC: 50 MG/DL
LDLC SERPL CALC-MCNC: 72 MG/DL
NONHDLC SERPL-MCNC: 92 MG/DL
POTASSIUM SERPL-SCNC: 4.1 MMOL/L (ref 3.4–5.3)
PSA SERPL DL<=0.01 NG/ML-MCNC: 1.39 NG/ML (ref 0–4.5)
SODIUM SERPL-SCNC: 137 MMOL/L (ref 136–145)
TRIGL SERPL-MCNC: 98 MG/DL

## 2023-05-18 ENCOUNTER — MYC REFILL (OUTPATIENT)
Dept: FAMILY MEDICINE | Facility: CLINIC | Age: 68
End: 2023-05-18
Payer: COMMERCIAL

## 2023-05-18 DIAGNOSIS — I10 BENIGN ESSENTIAL HYPERTENSION: ICD-10-CM

## 2023-05-19 RX ORDER — LOSARTAN POTASSIUM 100 MG/1
TABLET ORAL
Qty: 30 TABLET | Refills: 0 | Status: SHIPPED | OUTPATIENT
Start: 2023-05-19 | End: 2023-06-19

## 2023-05-19 RX ORDER — HYDROCHLOROTHIAZIDE 25 MG/1
TABLET ORAL
Qty: 30 TABLET | Refills: 0 | Status: SHIPPED | OUTPATIENT
Start: 2023-05-19 | End: 2023-07-17

## 2023-05-19 RX ORDER — ATENOLOL 50 MG/1
50 TABLET ORAL DAILY
Qty: 90 TABLET | Refills: 3 | Status: SHIPPED | OUTPATIENT
Start: 2023-05-19 | End: 2024-05-22

## 2023-05-19 RX ORDER — ATENOLOL 50 MG/1
50 TABLET ORAL DAILY
Qty: 60 TABLET | Refills: 0 | OUTPATIENT
Start: 2023-05-19

## 2023-05-19 RX ORDER — AMLODIPINE BESYLATE 10 MG/1
TABLET ORAL
Qty: 30 TABLET | Refills: 0 | Status: SHIPPED | OUTPATIENT
Start: 2023-05-19 | End: 2023-08-24

## 2023-05-19 NOTE — TELEPHONE ENCOUNTER
"Last Written Prescription Date:  3/2/2023  Last Fill Quantity: 60,  # refills: 0   Last office visit provider:  5/12/2023     Requested Prescriptions   Pending Prescriptions Disp Refills     atenolol (TENORMIN) 50 MG tablet 60 tablet 0     Sig: Take 1 tablet (50 mg) by mouth daily       Beta-Blockers Protocol Failed - 5/18/2023 11:52 AM        Failed - Recent (12 mo) or future (30 days) visit within the authorizing provider's specialty     Patient has had an office visit with the authorizing provider or a provider within the authorizing providers department within the previous 12 mos or has a future within next 30 days. See \"Patient Info\" tab in inbasket, or \"Choose Columns\" in Meds & Orders section of the refill encounter.              Passed - Blood pressure under 140/90 in past 12 months     BP Readings from Last 3 Encounters:   05/12/23 102/60   03/10/23 106/68   01/03/23 110/78                 Passed - Patient is age 6 or older        Passed - Medication is active on med list             Angella Guzman RN 05/19/23 12:44 PM  "

## 2023-05-19 NOTE — TELEPHONE ENCOUNTER
"Routing refill request to provider for review/approval because:  Drug not active on patient's medication list  Labs out of range:  Creatinine     atenolol (TENORMIN) 50 MG tablet ( Refused. Sent for refill today in separate encounter.)   Last Written Prescription Date:  5/19/23  Last Fill Quantity: 90,  # refills: 3   Last office visit provider:  5/12/23     amLODIPine (NORVASC) 10 MG tablet (Discontinued)  Last Written Prescription Date:  3/2/23  Last Fill Quantity: 60,  # refills: 0   Last office visit provider:  5/12/23     hydrochlorothiazide (HYDRODIURIL) 25 MG tablet  Last Written Prescription Date:  3/2/23  Last Fill Quantity: 60,  # refills: 0   Last office visit provider:  5/12/23     losartan (COZAAR) 100 MG tablet  Last Written Prescription Date:  3/2/23  Last Fill Quantity: 60,  # refills: 0   Last office visit provider:  5/12/23     Requested Prescriptions   Pending Prescriptions Disp Refills     atenolol (TENORMIN) 50 MG tablet [Pharmacy Med Name: Atenolol Oral Tablet 50 MG] 60 tablet 0     Sig: Take 1 tablet (50 mg) by mouth daily.       Beta-Blockers Protocol Failed - 5/19/2023  2:12 PM        Failed - Recent (12 mo) or future (30 days) visit within the authorizing provider's specialty     Patient has had an office visit with the authorizing provider or a provider within the authorizing providers department within the previous 12 mos or has a future within next 30 days. See \"Patient Info\" tab in inbasket, or \"Choose Columns\" in Meds & Orders section of the refill encounter.              Passed - Blood pressure under 140/90 in past 12 months     BP Readings from Last 3 Encounters:   05/12/23 102/60   03/10/23 106/68   01/03/23 110/78                 Passed - Patient is age 6 or older        Passed - Medication is active on med list           amLODIPine (NORVASC) 10 MG tablet [Pharmacy Med Name: amLODIPine Besylate Oral Tablet 10 MG] 60 tablet 0     Sig: TAKE 1 TABLET by mouth EVERY DAY.       Calcium " "Channel Blockers Protocol  Failed - 5/19/2023  2:12 PM        Failed - Recent (12 mo) or future (30 days) visit within the authorizing provider's specialty     Patient has had an office visit with the authorizing provider or a provider within the authorizing providers department within the previous 12 mos or has a future within next 30 days. See \"Patient Info\" tab in inbasket, or \"Choose Columns\" in Meds & Orders section of the refill encounter.              Failed - Medication is active on med list        Failed - Normal serum creatinine on file in past 12 months     Recent Labs   Lab Test 05/12/23  0946   CR 1.41*       Ok to refill medication if creatinine is low          Passed - Blood pressure under 140/90 in past 12 months     BP Readings from Last 3 Encounters:   05/12/23 102/60   03/10/23 106/68 01/03/23 110/78                 Passed - Patient is age 18 or older           hydrochlorothiazide (HYDRODIURIL) 25 MG tablet [Pharmacy Med Name: hydroCHLOROthiazide Oral Tablet 25 MG] 60 tablet 0     Sig: TAKE 1 TABLET BY MOUTH EVERY DAY       Diuretics (Including Combos) Protocol Failed - 5/18/2023  1:51 PM        Failed - Recent (12 mo) or future (30 days) visit within the authorizing provider's specialty     Patient has had an office visit with the authorizing provider or a provider within the authorizing providers department within the previous 12 mos or has a future within next 30 days. See \"Patient Info\" tab in inbasket, or \"Choose Columns\" in Meds & Orders section of the refill encounter.              Failed - Normal serum creatinine on file in past 12 months     Recent Labs   Lab Test 05/12/23  0946   CR 1.41*              Passed - Blood pressure under 140/90 in past 12 months     BP Readings from Last 3 Encounters:   05/12/23 102/60   03/10/23 106/68   01/03/23 110/78                 Passed - Medication is active on med list        Passed - Patient is age 18 or older        Passed - Normal serum potassium " "on file in past 12 months     Recent Labs   Lab Test 05/12/23  0946   POTASSIUM 4.1                    Passed - Normal serum sodium on file in past 12 months     Recent Labs   Lab Test 05/12/23  0946                    losartan (COZAAR) 100 MG tablet [Pharmacy Med Name: Losartan Potassium Oral Tablet 100 MG] 60 tablet 0     Sig: TAKE 1 TABLET BY MOUTH EVERY DAY       Angiotensin-II Receptors Failed - 5/18/2023  1:51 PM        Failed - Recent (12 mo) or future (30 days) visit within the authorizing provider's specialty     Patient has had an office visit with the authorizing provider or a provider within the authorizing providers department within the previous 12 mos or has a future within next 30 days. See \"Patient Info\" tab in inbasket, or \"Choose Columns\" in Meds & Orders section of the refill encounter.              Failed - Normal serum creatinine on file in past 12 months     Recent Labs   Lab Test 05/12/23  0946   CR 1.41*       Ok to refill medication if creatinine is low          Passed - Last blood pressure under 140/90 in past 12 months     BP Readings from Last 3 Encounters:   05/12/23 102/60   03/10/23 106/68   01/03/23 110/78                 Passed - Medication is active on med list        Passed - Patient is age 18 or older        Passed - Normal serum potassium on file in past 12 months     Recent Labs   Lab Test 05/12/23  0946   POTASSIUM 4.1                         SAMUEL KELLY RN 05/19/23 2:14 PM  "

## 2023-05-22 NOTE — TELEPHONE ENCOUNTER
Called and spoke w/ pt regarding med refill and told pt that Atenolol was denied. Pt asked why it was denied when he spoke to his PCP about this med already. Than confirmed w/ pt, if he was still seeing care at [facility]. Pt stated that he is seeing a different PCP now an will not longer seeing care at [facility].

## 2023-05-24 DIAGNOSIS — M25.569 KNEE PAIN: Primary | ICD-10-CM

## 2023-05-31 NOTE — TELEPHONE ENCOUNTER
DIAGNOSIS: Primary osteoarthritis of both knees Chronic pain of both knees  - possible cortisone inj / Dr. Nicol Corrigan / No updated imaging / [facility]   APPOINTMENT DATE: 6/1/23   NOTES STATUS DETAILS   OFFICE NOTE from referring provider Internal 5/12/23 OV Nicol Corrigan, CORNELIO CNP   OFFICE NOTE from other specialist Internal 1/2/2020 OV Esteban Espino MD  1/2/2020 OV Ashley Villagran, PT  11/15/19 OV Davey Estes DO   MEDICATION LIST Internal    [facility]  Reports: Epic   Images: PACS  XR Knee Left: 12/19/19, 12/14/18  MR Left Knee: 11/20/19

## 2023-06-01 ENCOUNTER — ANCILLARY PROCEDURE (OUTPATIENT)
Dept: GENERAL RADIOLOGY | Facility: CLINIC | Age: 68
End: 2023-06-01
Attending: FAMILY MEDICINE
Payer: COMMERCIAL

## 2023-06-01 ENCOUNTER — PRE VISIT (OUTPATIENT)
Dept: ORTHOPEDICS | Facility: CLINIC | Age: 68
End: 2023-06-01

## 2023-06-01 ENCOUNTER — OFFICE VISIT (OUTPATIENT)
Dept: ORTHOPEDICS | Facility: CLINIC | Age: 68
End: 2023-06-01
Attending: NURSE PRACTITIONER
Payer: COMMERCIAL

## 2023-06-01 VITALS — HEIGHT: 63 IN | BODY MASS INDEX: 32.25 KG/M2 | WEIGHT: 182 LBS

## 2023-06-01 DIAGNOSIS — M17.0 PRIMARY OSTEOARTHRITIS OF BOTH KNEES: ICD-10-CM

## 2023-06-01 DIAGNOSIS — M25.569 KNEE PAIN: ICD-10-CM

## 2023-06-01 PROCEDURE — 99203 OFFICE O/P NEW LOW 30 MIN: CPT | Mod: 25 | Performed by: FAMILY MEDICINE

## 2023-06-01 PROCEDURE — 20610 DRAIN/INJ JOINT/BURSA W/O US: CPT | Mod: 50 | Performed by: FAMILY MEDICINE

## 2023-06-01 PROCEDURE — 73562 X-RAY EXAM OF KNEE 3: CPT | Mod: RT | Performed by: RADIOLOGY

## 2023-06-01 RX ORDER — LIDOCAINE HYDROCHLORIDE 10 MG/ML
4 INJECTION, SOLUTION EPIDURAL; INFILTRATION; INTRACAUDAL; PERINEURAL
Status: SHIPPED | OUTPATIENT
Start: 2023-06-01

## 2023-06-01 RX ORDER — TRIAMCINOLONE ACETONIDE 40 MG/ML
40 INJECTION, SUSPENSION INTRA-ARTICULAR; INTRAMUSCULAR
Status: SHIPPED | OUTPATIENT
Start: 2023-06-01

## 2023-06-01 RX ADMIN — LIDOCAINE HYDROCHLORIDE 4 ML: 10 INJECTION, SOLUTION EPIDURAL; INFILTRATION; INTRACAUDAL; PERINEURAL at 09:36

## 2023-06-01 RX ADMIN — TRIAMCINOLONE ACETONIDE 40 MG: 40 INJECTION, SUSPENSION INTRA-ARTICULAR; INTRAMUSCULAR at 09:36

## 2023-06-01 NOTE — NURSING NOTE
[address] [address]  Dept: [phone]  ______________________________________________________________________________    Patient: Ming Garnett   : 1955   MRN: [id number]   2023    INVASIVE PROCEDURE SAFETY CHECKLIST    Date: 2023   Procedure: Bilateral knee cortisone injections  Patient Name: Ming Garnett  MRN: [id number]  YOB: 1955    Action: Complete sections as appropriate. Any discrepancy results in a HARD COPY until resolved.     PRE PROCEDURE:  Patient ID verified with 2 identifiers (name and  or MRN): Yes  Procedure and site verified with patient/designee (when able): Yes  Accurate consent documentation in medical record: Yes  H&P (or appropriate assessment) documented in medical record: Yes  H&P must be up to 20 days prior to procedure and updates within 24 hours of procedure as applicable: NA  Relevant diagnostic and radiology test results appropriately labeled and displayed as applicable: Yes  Procedure site(s) marked with provider initials: NA    TIMEOUT:  Time-Out performed immediately prior to starting procedure, including verbal and active participation of all team members addressing the following:Yes  * Correct patient identify  * Confirmed that the correct side and site are marked  * An accurate procedure consent form  * Agreement on the procedure to be done  * Correct patient position  * Relevant images and results are properly labeled and appropriately displayed  * The need to administer antibiotics or fluids for irrigation purposes during the procedure as applicable   * Safety precautions based on patient history or medication use    DURING PROCEDURE: Verification of correct person, site, and procedures any time the responsibility for care of the patient is transferred to another member of the care team.       Prior to injection, verified patient identity using patient's name and  date of birth.  Due to injection administration, patient instructed to remain in clinic for 15 minutes  afterwards, and to report any adverse reaction to me immediately.    Joint injection was performed.      Drug Amount Wasted:  Yes: 2 mg/ml   Vial/Syringe: Single dose vial  Expiration Date:  12/01/2026      Chely Magana ATC  June 1, 2023

## 2023-06-01 NOTE — LETTER
6/1/2023      RE: Ming Garnett  [address] [place] [address]     Dear Colleague,    Thank you for referring your patient, Ming Garnett, to the [facility] [place]. Please see a copy of my visit note below.    Bilateral knee pain    Today, the patient reports that he has had bilateral knee pain for many years without recent injuries. The pain is worse on the right knee. Pain is located over the medial aspect of the bilateral knees. He does report that both the cortisone and viscosupplement injections have been beneficial for him. Pain is worse with weightbearing activities and will bother his back then. He has not been to physical therapy. He takes ibuprofen for his pain.     Patient consulted with Dr. Esteban Espino and 2020 regarding left-sided knee replacement, which was offered at the time.  Patient indicates today that he does not want to proceed with knee replacement at this time, but that he is talking about it with his wife and is planning on doing knee replacement surgery sometime in late angelo or winter 2023.    Past history of knee arthroscopy 25 years ago.    He has worked as a  and uses his hands and knees a lot.        PMH:  Past Medical History:   Diagnosis Date    Hearing problem     Hypertension     Renal disease     Sensorineural hearing loss     Sleep apnea     Knee arthroscopy 20 years   left    Carpal tunnel release 20 years   bilaterl    Bladder procedure 12 years ago    Exam under anesthesia, hemorrhoidectomy 12-14-12       Active problem list:  Patient Active Problem List   Diagnosis    HTN (hypertension), benign    Insomnia, unspecified type    Depression with anxiety    Hyperlipidemia LDL goal <130    Mild major depression (H)    Stage 3a chronic kidney disease (H)    Cigarette smoker    Albuminuria    Benign prostatic hyperplasia with nocturia       FH:  Family History   Problem Relation Age of Onset    Breast Cancer  Mother     Hypertension Mother     Other Cancer Father     Aneurysm Father     Hypertension Father        SH:  Social History     Socioeconomic History    Marital status:      Spouse name: Not on file    Number of children: Not on file    Years of education: Not on file    Highest education level: Not on file   Occupational History    Not on file   Tobacco Use    Smoking status: Every Day     Packs/day: 0.50     Years: 22.00     Pack years: 11.00     Types: Cigarettes     Start date: 2000     Last attempt to quit: 2022     Years since quittin.5    Smokeless tobacco: Never   Vaping Use    Vaping status: Not on file   Substance and Sexual Activity    Alcohol use: Yes     Alcohol/week: 0.0 standard drinks of alcohol     Comment: a few beers per day     Drug use: Never    Sexual activity: Yes     Partners: Female   Other Topics Concern    Parent/sibling w/ CABG, MI or angioplasty before 65F 55M? No   Social History Narrative    Not on file     Social Determinants of Health     Financial Resource Strain: Not on file   Food Insecurity: Not on file   Transportation Needs: Not on file   Physical Activity: Not on file   Stress: Not on file   Social Connections: Not on file   Intimate Partner Violence: Not on file   Housing Stability: Not on file       MEDS:  See EMR, reviewed  ALL:  See EMR, reviewed    REVIEW OF SYSTEMS:  CONSTITUTIONAL:NEGATIVE for fever, chills, change in weight  INTEGUMENTARY/SKIN: NEGATIVE for worrisome rashes, moles or lesions  EYES: NEGATIVE for vision changes or irritation  ENT/MOUTH: NEGATIVE for ear, mouth and throat problems  RESP:NEGATIVE for significant cough or SOB  BREAST: NEGATIVE for masses, tenderness or discharge  CV: NEGATIVE for chest pain, palpitations or peripheral edema  GI: NEGATIVE for nausea, abdominal pain, heartburn, or change in bowel habits  :NEGATIVE for frequency, dysuria, or hematuria  :NEGATIVE for frequency, dysuria, or hematuria  NEURO: NEGATIVE  for weakness, dizziness or paresthesias  ENDOCRINE: NEGATIVE for temperature intolerance, skin/hair changes  HEME/ALLERGY/IMMUNE: NEGATIVE for bleeding problems  PSYCHIATRIC: NEGATIVE for changes in mood or affect        Objective: There is no bilateral knee effusion.  He lacks full extension of both knees by approximately 5 to 10 degrees.  Normal range of motion of the bilateral hips.  Tender over the medial joint line bilaterally.  Nontender over the patellar tendon or pes anserine bursa bilaterally.  Overlying skin is intact.  No significant varus or valgus deformity.  Appropriate conversation and affect.    I personally reviewed with the patient knee x-rays that show severe bone-on-bone DJD in the medial joint space bilaterally as well as signs of patellofemoral DJD.      Assessment: Bilateral knee DJD, severe.    Plan: We discussed options including cortisone injection, Synvisc injection, and indications for knee replacement.  Patient understands that there is evidence that there may be a cap on the total number of cortisone shots that should be done into a particular joint over time, as it can put people in a category where they do not have a successful joint replacement.  Patient understands that if we repeat Synvisc injections we would need a week to contact his insurance company to see if its allowed.  He would like cortisone shots today.  After informed consent about bleeding, infection, steroid flare and after prepping with surgical scrub he was injected in the right knee from the lateral portion seated position with 1 cc of Kenalog 40 and 4 cc of 1% lidocaine.  After prep with surgical scrub he was injected in the opposite knee with 1 cc of Kenalog and 4 cc of 1% lidocaine.  He tolerated the injection without issue and left the clinic ambulatory.  Handicap sticker provided today.      Large Joint Injection: bilateral knee    Date/Time: 6/1/2023 9:36 AM    Performed by: Esteban Paniagua,  MD  Authorized by: Esteban Paniagua MD    Indications:  Pain  Needle Size:  25 G  Guidance: landmark guided    Approach:  Anteromedial  Location:  Knee  Laterality:  Bilateral      Medications (Right):  40 mg triamcinolone 40 MG/ML; 4 mL lidocaine (PF) 1 %  Medications (Left):  40 mg triamcinolone 40 MG/ML; 4 mL lidocaine (PF) 1 %  Outcome:  Tolerated well, no immediate complications  Procedure discussed: discussed risks, benefits, and alternatives    Consent Given by:  Patient  Timeout: timeout called immediately prior to procedure    Prep: patient was prepped and draped in usual sterile fashion              Again, thank you for allowing me to participate in the care of your patient.      Sincerely,    Esteban Paniagua MD

## 2023-06-01 NOTE — PROGRESS NOTES
Bilateral knee pain    Today, the patient reports that he has had bilateral knee pain for many years without recent injuries. The pain is worse on the right knee. Pain is located over the medial aspect of the bilateral knees. He does report that both the cortisone and viscosupplement injections have been beneficial for him. Pain is worse with weightbearing activities and will bother his back then. He has not been to physical therapy. He takes ibuprofen for his pain.     Patient consulted with Dr. Esteban Espino and 2020 regarding left-sided knee replacement, which was offered at the time.  Patient indicates today that he does not want to proceed with knee replacement at this time, but that he is talking about it with his wife and is planning on doing knee replacement surgery sometime in late angelo or winter 2023.    Past history of knee arthroscopy 25 years ago.    He has worked as a  and uses his hands and knees a lot.        PMH:  Past Medical History:   Diagnosis Date     Hearing problem      Hypertension      Renal disease      Sensorineural hearing loss      Sleep apnea      Knee arthroscopy 20 years   left     Carpal tunnel release 20 years   bilaterl     Bladder procedure 12 years ago     Exam under anesthesia, hemorrhoidectomy 12-14-12       Active problem list:  Patient Active Problem List   Diagnosis     HTN (hypertension), benign     Insomnia, unspecified type     Depression with anxiety     Hyperlipidemia LDL goal <130     Mild major depression (H)     Stage 3a chronic kidney disease (H)     Cigarette smoker     Albuminuria     Benign prostatic hyperplasia with nocturia       FH:  Family History   Problem Relation Age of Onset     Breast Cancer Mother      Hypertension Mother      Other Cancer Father      Aneurysm Father      Hypertension Father        SH:  Social History     Socioeconomic History     Marital status:      Spouse name: Not on file     Number of children: Not on file      Years of education: Not on file     Highest education level: Not on file   Occupational History     Not on file   Tobacco Use     Smoking status: Every Day     Packs/day: 0.50     Years: 22.00     Pack years: 11.00     Types: Cigarettes     Start date: 2000     Last attempt to quit: 2022     Years since quittin.5     Smokeless tobacco: Never   Vaping Use     Vaping status: Not on file   Substance and Sexual Activity     Alcohol use: Yes     Alcohol/week: 0.0 standard drinks of alcohol     Comment: a few beers per day      Drug use: Never     Sexual activity: Yes     Partners: Female   Other Topics Concern     Parent/sibling w/ CABG, MI or angioplasty before 65F 55M? No   Social History Narrative     Not on file     Social Determinants of Health     Financial Resource Strain: Not on file   Food Insecurity: Not on file   Transportation Needs: Not on file   Physical Activity: Not on file   Stress: Not on file   Social Connections: Not on file   Intimate Partner Violence: Not on file   Housing Stability: Not on file       MEDS:  See EMR, reviewed  ALL:  See EMR, reviewed    REVIEW OF SYSTEMS:  CONSTITUTIONAL:NEGATIVE for fever, chills, change in weight  INTEGUMENTARY/SKIN: NEGATIVE for worrisome rashes, moles or lesions  EYES: NEGATIVE for vision changes or irritation  ENT/MOUTH: NEGATIVE for ear, mouth and throat problems  RESP:NEGATIVE for significant cough or SOB  BREAST: NEGATIVE for masses, tenderness or discharge  CV: NEGATIVE for chest pain, palpitations or peripheral edema  GI: NEGATIVE for nausea, abdominal pain, heartburn, or change in bowel habits  :NEGATIVE for frequency, dysuria, or hematuria  :NEGATIVE for frequency, dysuria, or hematuria  NEURO: NEGATIVE for weakness, dizziness or paresthesias  ENDOCRINE: NEGATIVE for temperature intolerance, skin/hair changes  HEME/ALLERGY/IMMUNE: NEGATIVE for bleeding problems  PSYCHIATRIC: NEGATIVE for changes in mood or affect        Objective:  There is no bilateral knee effusion.  He lacks full extension of both knees by approximately 5 to 10 degrees.  Normal range of motion of the bilateral hips.  Tender over the medial joint line bilaterally.  Nontender over the patellar tendon or pes anserine bursa bilaterally.  Overlying skin is intact.  No significant varus or valgus deformity.  Appropriate conversation and affect.    I personally reviewed with the patient knee x-rays that show severe bone-on-bone DJD in the medial joint space bilaterally as well as signs of patellofemoral DJD.      Assessment: Bilateral knee DJD, severe.    Plan: We discussed options including cortisone injection, Synvisc injection, and indications for knee replacement.  Patient understands that there is evidence that there may be a cap on the total number of cortisone shots that should be done into a particular joint over time, as it can put people in a category where they do not have a successful joint replacement.  Patient understands that if we repeat Synvisc injections we would need a week to contact his insurance company to see if its allowed.  He would like cortisone shots today.  After informed consent about bleeding, infection, steroid flare and after prepping with surgical scrub he was injected in the right knee from the lateral portion seated position with 1 cc of Kenalog 40 and 4 cc of 1% lidocaine.  After prep with surgical scrub he was injected in the opposite knee with 1 cc of Kenalog and 4 cc of 1% lidocaine.  He tolerated the injection without issue and left the clinic ambulatory.  Handicap sticker provided today.      Large Joint Injection: bilateral knee    Date/Time: 6/1/2023 9:36 AM    Performed by: Esteban Paniagua MD  Authorized by: Esteban Paniagua MD    Indications:  Pain  Needle Size:  25 G  Guidance: landmark guided    Approach:  Anteromedial  Location:  Knee  Laterality:  Bilateral      Medications (Right):  40 mg triamcinolone 40 MG/ML; 4 mL  lidocaine (PF) 1 %  Medications (Left):  40 mg triamcinolone 40 MG/ML; 4 mL lidocaine (PF) 1 %  Outcome:  Tolerated well, no immediate complications  Procedure discussed: discussed risks, benefits, and alternatives    Consent Given by:  Patient  Timeout: timeout called immediately prior to procedure    Prep: patient was prepped and draped in usual sterile fashion

## 2023-06-17 DIAGNOSIS — I10 BENIGN ESSENTIAL HYPERTENSION: ICD-10-CM

## 2023-06-18 NOTE — TELEPHONE ENCOUNTER
"Routing refill request to provider for review/approval because:  Labs out of range:  cr    Last Written Prescription Date:  5/19/23  Last Fill Quantity: 30,  # refills: 0   Last office visit provider:  5/12/23     Requested Prescriptions   Pending Prescriptions Disp Refills     losartan (COZAAR) 100 MG tablet [Pharmacy Med Name: Losartan Potassium Oral Tablet 100 MG] 30 tablet 0     Sig: TAKE 1 TABLET BY MOUTH EVERY DAY       Angiotensin-II Receptors Failed - 6/17/2023 10:09 AM        Failed - Recent (12 mo) or future (30 days) visit within the authorizing provider's specialty     Patient has had an office visit with the authorizing provider or a provider within the authorizing providers department within the previous 12 mos or has a future within next 30 days. See \"Patient Info\" tab in inbasket, or \"Choose Columns\" in Meds & Orders section of the refill encounter.              Failed - Normal serum creatinine on file in past 12 months     Recent Labs   Lab Test 05/12/23  0946   CR 1.41*       Ok to refill medication if creatinine is low          Passed - Last blood pressure under 140/90 in past 12 months     BP Readings from Last 3 Encounters:   05/12/23 102/60   03/10/23 106/68   01/03/23 110/78                 Passed - Medication is active on med list        Passed - Patient is age 18 or older        Passed - Normal serum potassium on file in past 12 months     Recent Labs   Lab Test 05/12/23  0946   POTASSIUM 4.1                         Pamela Ayala RN 06/18/23 4:34 PM  "

## 2023-06-19 RX ORDER — LOSARTAN POTASSIUM 100 MG/1
TABLET ORAL
Qty: 90 TABLET | Refills: 3 | Status: SHIPPED | OUTPATIENT
Start: 2023-06-19 | End: 2024-05-22

## 2023-07-05 DIAGNOSIS — M25.562 BILATERAL KNEE PAIN: Primary | ICD-10-CM

## 2023-07-05 DIAGNOSIS — M25.561 BILATERAL KNEE PAIN: Primary | ICD-10-CM

## 2023-07-10 NOTE — TELEPHONE ENCOUNTER
DIAGNOSIS: Primary osteoarthritis of both knees [M17.0]    APPOINTMENT DATE: 07/12/2023   NOTES STATUS DETAILS   OFFICE NOTE from referring provider Internal 06/01/2023 - Esteban Paniagua MD - [facility]   OFFICE NOTE from other specialist Internal 2020 - Esteban Espino MD - [facility]    2020 - Davey Estes DO - [facility]   MEDICATION LIST Internal    LABS     MRI Internal    XRAYS (IMAGES & REPORTS) Internal

## 2023-07-12 ENCOUNTER — PRE VISIT (OUTPATIENT)
Dept: ORTHOPEDICS | Facility: CLINIC | Age: 68
End: 2023-07-12

## 2023-07-16 DIAGNOSIS — I10 BENIGN ESSENTIAL HYPERTENSION: ICD-10-CM

## 2023-07-17 RX ORDER — HYDROCHLOROTHIAZIDE 25 MG/1
TABLET ORAL
Qty: 30 TABLET | Refills: 0 | Status: SHIPPED | OUTPATIENT
Start: 2023-07-17 | End: 2024-05-22

## 2023-07-17 NOTE — TELEPHONE ENCOUNTER
"Routing refill request to provider for review/approval because:  Labs out of range:  Cr    Last Written Prescription Date:  5/19/23  Last Fill Quantity: 30,  # refills: 0  Last office visit provider:  5/12/23     Requested Prescriptions   Pending Prescriptions Disp Refills    hydrochlorothiazide (HYDRODIURIL) 25 MG tablet [Pharmacy Med Name: hydroCHLOROthiazide Oral Tablet 25 MG] 30 tablet 0     Sig: TAKE 1 TABLET BY MOUTH EVERY DAY       Diuretics (Including Combos) Protocol Failed - 7/17/2023 12:44 PM        Failed - Recent (12 mo) or future (30 days) visit within the authorizing provider's specialty     Patient has had an office visit with the authorizing provider or a provider within the authorizing providers department within the previous 12 mos or has a future within next 30 days. See \"Patient Info\" tab in inbasket, or \"Choose Columns\" in Meds & Orders section of the refill encounter.      PASSED: OV on 5/12/23 - Annual wellness visit        Failed - Normal serum creatinine on file in past 12 months     Recent Labs   Lab Test 05/12/23  0946   CR 1.41*              Passed - Blood pressure under 140/90 in past 12 months     BP Readings from Last 3 Encounters:   05/12/23 102/60   03/10/23 106/68   01/03/23 110/78                 Passed - Medication is active on med list        Passed - Patient is age 18 or older        Passed - Normal serum potassium on file in past 12 months     Recent Labs   Lab Test 05/12/23  0946   POTASSIUM 4.1                    Passed - Normal serum sodium on file in past 12 months     Recent Labs   Lab Test 05/12/23  0946                      Nivia Brasher RN 07/17/23 12:52 PM  "

## 2023-08-22 DIAGNOSIS — I10 BENIGN ESSENTIAL HYPERTENSION: ICD-10-CM

## 2023-08-22 NOTE — TELEPHONE ENCOUNTER
"Routing refill request to provider for review/approval because:  Labs out of range:  CR    Last Written Prescription Date:  05/19/2023  Last Fill Quantity: 30,  # refills: 0   Last office visit provider:  05/12/2023     Requested Prescriptions   Pending Prescriptions Disp Refills    amLODIPine (NORVASC) 10 MG tablet [Pharmacy Med Name: amLODIPine Besylate Oral Tablet 10 MG] 30 tablet 0     Sig: TAKE 1 TABLET by mouth EVERY DAY.       Calcium Channel Blockers Protocol  Failed - 8/22/2023  4:31 PM        Failed - Recent (12 mo) or future (30 days) visit within the authorizing provider's specialty     Patient has had an office visit with the authorizing provider or a provider within the authorizing providers department within the previous 12 mos or has a future within next 30 days. See \"Patient Info\" tab in inbasket, or \"Choose Columns\" in Meds & Orders section of the refill encounter.              Failed - Normal serum creatinine on file in past 12 months     Recent Labs   Lab Test 05/12/23  0946   CR 1.41*       Ok to refill medication if creatinine is low          Passed - Blood pressure under 140/90 in past 12 months     BP Readings from Last 3 Encounters:   05/12/23 102/60   03/10/23 106/68   01/03/23 110/78                 Passed - Medication is active on med list        Passed - Patient is age 18 or older             Telma Hyde RN 08/22/23 4:32 PM  "

## 2023-08-24 DIAGNOSIS — F51.01 PRIMARY INSOMNIA: ICD-10-CM

## 2023-08-24 RX ORDER — AMLODIPINE BESYLATE 10 MG/1
TABLET ORAL
Qty: 30 TABLET | Refills: 0 | Status: SHIPPED | OUTPATIENT
Start: 2023-08-24 | End: 2023-12-14

## 2023-08-29 ENCOUNTER — TELEPHONE (OUTPATIENT)
Dept: FAMILY MEDICINE | Facility: CLINIC | Age: 68
End: 2023-08-29
Payer: COMMERCIAL

## 2023-08-29 DIAGNOSIS — F51.01 PRIMARY INSOMNIA: ICD-10-CM

## 2023-08-29 RX ORDER — ZOLPIDEM TARTRATE 5 MG/1
2.5 TABLET ORAL
Qty: 30 TABLET | Refills: 3 | Status: SHIPPED | OUTPATIENT
Start: 2023-08-29 | End: 2023-08-30

## 2023-08-29 NOTE — TELEPHONE ENCOUNTER
07/06/2023 05/12/2023 1 Zolpidem Tartrate 5 Mg Tablet 30.00 60 Me Elu [id number] Sup (6435) 1/1 0.13 LME [place]   05/12/2023 05/12/2023 1 Zolpidem Tartrate 5 Mg Tablet 30.00 60 Me Elu [id number] Sup (6435) 0/1      CORNELIO Bear CNP on 8/29/2023 at 12:30 AM

## 2023-08-29 NOTE — TELEPHONE ENCOUNTER
General Call      Reason for Call:  Medication direction question. Zolpidem.     What are your questions or concerns:  Pharmacist has questions on directions.    zolpidem (AMBIEN) 5 MG tablet 30 tablet 3 8/29/2023  No   Sig - Route: Take 0.5 tablets (2.5 mg) by mouth nightly as needed for sleep Take tablet by mouth 15 minutes prior to sleep - Oral       Date of last appointment with provider: 05/12/2023 with PCP    Daisha Baez

## 2023-08-30 RX ORDER — ZOLPIDEM TARTRATE 5 MG/1
2.5 TABLET ORAL
Qty: 30 TABLET | Refills: 3 | Status: SHIPPED | OUTPATIENT
Start: 2023-08-30 | End: 2024-05-22

## 2023-09-15 DIAGNOSIS — Z91.89 FRAMINGHAM CARDIAC RISK >20% IN NEXT 10 YEARS: ICD-10-CM

## 2023-09-15 RX ORDER — ATORVASTATIN CALCIUM 40 MG/1
40 TABLET, FILM COATED ORAL DAILY
Qty: 60 TABLET | Refills: 5 | Status: SHIPPED | OUTPATIENT
Start: 2023-09-15 | End: 2024-04-01

## 2023-10-23 ENCOUNTER — OFFICE VISIT (OUTPATIENT)
Dept: FAMILY MEDICINE | Facility: CLINIC | Age: 68
End: 2023-10-23
Payer: COMMERCIAL

## 2023-10-23 ENCOUNTER — ANCILLARY PROCEDURE (OUTPATIENT)
Dept: GENERAL RADIOLOGY | Facility: CLINIC | Age: 68
End: 2023-10-23
Attending: FAMILY MEDICINE
Payer: COMMERCIAL

## 2023-10-23 VITALS
OXYGEN SATURATION: 96 % | RESPIRATION RATE: 14 BRPM | DIASTOLIC BLOOD PRESSURE: 81 MMHG | WEIGHT: 182 LBS | HEART RATE: 61 BPM | SYSTOLIC BLOOD PRESSURE: 137 MMHG | TEMPERATURE: 98.3 F | BODY MASS INDEX: 32.24 KG/M2

## 2023-10-23 DIAGNOSIS — J32.9 RHINOSINUSITIS: ICD-10-CM

## 2023-10-23 DIAGNOSIS — J18.0 BRONCHOPNEUMONIA: Primary | ICD-10-CM

## 2023-10-23 DIAGNOSIS — F10.20 ALCOHOL DEPENDENCE, DAILY USE (H): ICD-10-CM

## 2023-10-23 DIAGNOSIS — F17.200 SMOKER: ICD-10-CM

## 2023-10-23 DIAGNOSIS — R05.1 ACUTE COUGH: ICD-10-CM

## 2023-10-23 DIAGNOSIS — Z20.822 SUSPECTED 2019 NOVEL CORONAVIRUS INFECTION: ICD-10-CM

## 2023-10-23 DIAGNOSIS — J45.21 MILD INTERMITTENT REACTIVE AIRWAY DISEASE WITH ACUTE EXACERBATION: ICD-10-CM

## 2023-10-23 LAB — SARS-COV-2 RNA RESP QL NAA+PROBE: NEGATIVE

## 2023-10-23 PROCEDURE — 87635 SARS-COV-2 COVID-19 AMP PRB: CPT | Performed by: FAMILY MEDICINE

## 2023-10-23 PROCEDURE — 71046 X-RAY EXAM CHEST 2 VIEWS: CPT | Mod: TC | Performed by: RADIOLOGY

## 2023-10-23 PROCEDURE — 99214 OFFICE O/P EST MOD 30 MIN: CPT | Performed by: FAMILY MEDICINE

## 2023-10-23 RX ORDER — ALBUTEROL SULFATE 90 UG/1
2 AEROSOL, METERED RESPIRATORY (INHALATION) EVERY 6 HOURS
Qty: 18 G | Refills: 0 | Status: SHIPPED | OUTPATIENT
Start: 2023-10-23

## 2023-10-23 RX ORDER — PREDNISONE 20 MG/1
40 TABLET ORAL DAILY
Qty: 10 TABLET | Refills: 0 | Status: SHIPPED | OUTPATIENT
Start: 2023-10-23 | End: 2023-10-28

## 2023-10-23 RX ORDER — BENZONATATE 200 MG/1
200 CAPSULE ORAL 3 TIMES DAILY PRN
Qty: 30 CAPSULE | Refills: 0 | Status: SHIPPED | OUTPATIENT
Start: 2023-10-23 | End: 2024-05-22

## 2023-10-23 RX ORDER — DOXYCYCLINE HYCLATE 100 MG
100 TABLET ORAL 2 TIMES DAILY
Qty: 20 TABLET | Refills: 0 | Status: SHIPPED | OUTPATIENT
Start: 2023-10-23 | End: 2023-11-02

## 2023-10-23 NOTE — PROGRESS NOTES
ASSESSMENT/PLAN:      ICD-10-CM    1. Bronchopneumonia  J18.0 predniSONE (DELTASONE) 20 MG tablet     doxycycline hyclate (VIBRA-TABS) 100 MG tablet     benzonatate (TESSALON) 200 MG capsule      2. Mild intermittent reactive airway disease with acute exacerbation  J45.21 predniSONE (DELTASONE) 20 MG tablet     doxycycline hyclate (VIBRA-TABS) 100 MG tablet     benzonatate (TESSALON) 200 MG capsule     albuterol (PROAIR HFA/PROVENTIL HFA/VENTOLIN HFA) 108 (90 Base) MCG/ACT inhaler      3. Rhinosinusitis  J32.9       4. Smoker  F17.200 XR Chest 2 Views    50-pack-year history      5. Alcohol dependence, daily use (H)  F10.20     6 pack a day or more-no hx of withdrawals, last use 10/23/2023 -4 days ago      6. Acute cough  R05.1 XR Chest 2 Views      7. Suspected 2019 novel coronavirus infection  Z20.822 Symptomatic COVID-19 Virus (Coronavirus) by PCR Nose          Patient Instructions   Start doxycycline-an antibiotic  2 times a day for 10 days always take with food to prevent nausea vomiting for your bronchitis      Start prednisone 2 tablets daily for 5 days-do not take at night -will make it difficult to sleep     Continue your albuterol inhaler-   2 puffs in a.m. and bedtime and every 4-6 hours as needed for wheezing  if you are having a coughing spell you can take 2 puffs as needed to help decrease the cough        Start Claritin 10 mg a day for 30 days for congestion      Take tessalon perles ( benzonatate) pills for cough up to 3 times a day will not make you sleepy       Take benadryl ( diphenhydramine) at bedtime will help with congestion and cough will make you sleepy          Return to clinic or to ER if symptoms worsen     Follow up with your primary care provider or clinic in about 2-3 days  if your symptoms do not improve              Reviewed medication instructions and side effects. Follow up if experiences side effects.     I reviewed supportive care, otc meds to use if needed, expected course,  and signs of concern.  Follow up as needed or if he does not improve within  1-2 days or if worsens in any way.  Reviewed red flag symptoms and is to go to the ER if experiences any of these.     The use of Dragon/Akvoation services may have been used to construct the content in this note; any grammatical or spelling errors are non-intentional. Please contact the author of this note directly if you are in need of any clarification.      On the day of the encounter, time spend on chart review, patient visit, review of testing, documentation was 30 minutes              Patient presents with:  Sinus Problem: HAS SINUS CONGESTION DRAINAGE INTO CHEST FOR OVER 1 WEEK       Subjective     Ming Garnett is a 67 year old male who presents to clinic today for the following health issues:    HPI     Acute Illness  Acute illness concerns: Sinus congestion and cough  Onset/Duration: 1 week   Symptoms:  Fever: No  Chills/Sweats: No  Headache (location?): YES- mild -due to sinus congestion  Sinus Pressure: No  Conjunctivitis:  No  Ear Pain: no  Rhinorrhea: YES  Congestion: YES  Sore Throat: No  Cough: YES-productive  Wheeze: YES-no history of asthma, smokes a pack a day, 50-pack-year history  Decreased Appetite: YES  Nausea: YES-drinks a sixpack a day at times more, no alcohol for last 4 days nausea most likely due to recent decrease in alcohol intake, denies history of withdrawals  Vomiting: No  Diarrhea: No  Fatigue/Achiness: YES  Sick/Strep Exposure: unknown  Therapies tried and outcome:   OTC medications    Vaccinated for COVID,  no history of COVID    Past Medical History:   Diagnosis Date    Hearing problem     Hypertension     Renal disease     Sensorineural hearing loss     Sleep apnea      Social History     Tobacco Use    Smoking status: Every Day     Packs/day: 0.50     Years: 22.00     Additional pack years: 0.00     Total pack years: 11.00     Types: Cigarettes     Start date: 1/9/2000     Last attempt to  quit: 2022     Years since quittin.0    Smokeless tobacco: Never   Substance Use Topics    Alcohol use: Yes     Alcohol/week: 0.0 standard drinks of alcohol     Comment: a few beers per day        Current Outpatient Medications   Medication Sig Dispense Refill    albuterol (PROAIR HFA/PROVENTIL HFA/VENTOLIN HFA) 108 (90 Base) MCG/ACT inhaler Inhale 2 puffs into the lungs every 6 hours 18 g 0    amLODIPine (NORVASC) 10 MG tablet TAKE 1 TABLET by mouth EVERY DAY. 30 tablet 0    atenolol (TENORMIN) 50 MG tablet Take 1 tablet (50 mg) by mouth daily 90 tablet 3    benzonatate (TESSALON) 200 MG capsule Take 1 capsule (200 mg) by mouth 3 times daily as needed for cough 30 capsule 0    fluticasone (FLONASE) 50 MCG/ACT nasal spray Spray 1 spray in nostril daily 16 g 11    hydrochlorothiazide (HYDRODIURIL) 25 MG tablet TAKE 1 TABLET BY MOUTH EVERY DAY 30 tablet 0    losartan (COZAAR) 100 MG tablet TAKE 1 TABLET BY MOUTH EVERY DAY 90 tablet 3    oxybutynin ER (DITROPAN XL) 10 MG 24 hr tablet Take 1 tablet (10 mg) by mouth daily 30 tablet 3    sertraline (ZOLOFT) 100 MG tablet Take 1 tablet (100 mg) by mouth daily 60 tablet 0    atorvastatin (LIPITOR) 40 MG tablet Take 1 tablet (40 mg) by mouth daily 60 tablet 5    clotrimazole (LOTRIMIN) 1 % external cream Apply to affected area BID X 1 month supply 2 g 1    triamcinolone (KENALOG) 0.1 % external ointment APPLY 1 APPLICATION TWICE A DAY AS NEEDED TO BUTTOCK AREA 80 g 3    zolpidem (AMBIEN) 5 MG tablet Take 0.5 tablets (2.5 mg) by mouth nightly as needed for sleep 15 mins prior to going to bed 30 tablet 3     No Known Allergies          ROS are negative, except as otherwise noted HPI      Objective    /81   Pulse 61   Temp 98.3  F (36.8  C) (Oral)   Resp 14   Wt 82.6 kg (182 lb)   SpO2 96%   BMI 32.24 kg/m    Body mass index is 32.24 kg/m .  Physical Exam       GENERAL:   Alert and oriented x 3.  Minimal distress.  HEENT: Normocephalic, atraumatic. PEERRLA,  EOMI.  Scleras, lids and conjunctivae normal. Pinnas, canals and TM's clear.  Nose thick nasal congestion and oropharynx moist and clear.  NECK: supple and free of adenopathy   CHEST: Diffuse wheezing, decreased breath sounds in right base rhonchi or  rales.   HEART:  S1 and S2 normal, no murmurs, clicks, gallops or rubs. Regular rate and rhythm.    NEURO:Alert and oriented x3, normal strength and tone, normal gait       Diagnostic Test Results:  Labs reviewed in Epic  Results for orders placed or performed in visit on 10/23/23   XR Chest 2 Views     Status: None    Narrative    EXAM: XR CHEST 2 VIEWS  LOCATION: [facility]  DATE: 10/23/2023    INDICATION: decrased breath sounds in right base, smoker  COMPARISON: Low-dose lung cancer CT 06/21/2021, chest x-ray 01/13/2020      Impression    IMPRESSION: Costophrenic angles are clipped on the PA view. Lungs are clear. Heart and pulmonary vascularity are normal. No signs of acute disease.     Results for orders placed or performed in visit on 10/23/23   Symptomatic COVID-19 Virus (Coronavirus) by PCR Nose     Status: Normal    Specimen: Nose; Swab   Result Value Ref Range    SARS CoV2 PCR Negative Negative    Narrative    Testing was performed using the FullContact SARS-CoV-2 Assay on the  [employer] Instrument System. Additional information about this  Emergency Use Authorization (EUA) assay can be found via the Lab  Guide. This test should be ordered for the detection of SARS-CoV-2 in  individuals who meet SARS-CoV-2 clinical and/or epidemiological  criteria. Test performance is unknown in asymptomatic patients. This  test is for in vitro diagnostic use under the FDA EUA for  laboratories certified under CLIA to perform high complexity testing.  This test has not been FDA cleared or approved. A negative result  does not rule out the presence of PCR inhibitors in the specimen or  target RNA in concentration below the limit of detection for the  assay.  The possibility of a false negative should be considered if  the patient's recent exposure or clinical presentation suggests  COVID-19. This test was validated by the [facility]. This laboratory is certified under  the Clinical Laboratory Improvement Amendments of 1988 (CLIA-88) as  qualified to perform high complexity laboratory testing.

## 2023-12-04 NOTE — TELEPHONE ENCOUNTER
"Requested Prescriptions   Pending Prescriptions Disp Refills     traZODone (DESYREL) 50 MG tablet [Pharmacy Med Name: TraZODone HCl Oral Tablet 50 MG] 90 tablet 0     Sig: TAKE ONE TABLET BY MOUTH AT BEDTIME    Serotonin Modulators Passed    11/4/2018 10:13 AM       Passed - Recent (12 mo) or future (30 days) visit within the authorizing provider's specialty    Patient had office visit in the last 12 months or has a visit in the next 30 days with authorizing provider or within the authorizing provider's specialty.  See \"Patient Info\" tab in inbasket, or \"Choose Columns\" in Meds & Orders section of the refill encounter.             Passed - Patient is age 18 or older        Last office visit 11/17/17    Prescription approved per [facility] Refill Protocol.    Signed Prescriptions:                        Disp   Refills    traZODone (DESYREL) 50 MG tablet           30 tab*0        Sig: TAKE ONE TABLET BY MOUTH AT BEDTIME   Authorizing Provider: TIERRA LAND  Ordering User: ROSELYN HENDRIX      Closing encounter - no further actions needed at this time    Roselyn Hendrix RN    " -- DO NOT REPLY / DO NOT REPLY ALL --  -- Message is from Engagement Center Operations (ECO) --    General Patient Message: Patient is calling because she needs to have a new referral entered for orthopedic because the [facility] does not accept her insurance. She would like to have a new referral for [facility] or another provider that does accept her insurance     Caller Information         Type Contact Phone/Fax    12/04/2023 01:43 PM CST Phone (Incoming) Betty Sarmiento (Self) [phone] (M)          Alternative phone number: none    Can a detailed message be left? Yes    Message Turnaround:     Is it Working Hours? Yes - Working Hours     [place]:    Please give this turnaround time to the caller:   \"This message will be sent to [state Provider's name]. The clinical team will fulfill your request as soon as they review your message.\"

## 2023-12-14 DIAGNOSIS — I10 BENIGN ESSENTIAL HYPERTENSION: ICD-10-CM

## 2023-12-14 RX ORDER — AMLODIPINE BESYLATE 10 MG/1
TABLET ORAL
Qty: 30 TABLET | Refills: 0 | Status: SHIPPED | OUTPATIENT
Start: 2023-12-14 | End: 2024-01-08

## 2023-12-28 ENCOUNTER — APPOINTMENT (OUTPATIENT)
Dept: FAMILY MEDICINE | Facility: CLINIC | Age: 68
End: 2023-12-28
Payer: COMMERCIAL

## 2024-01-08 RX ORDER — AMLODIPINE BESYLATE 10 MG/1
TABLET ORAL
Qty: 90 TABLET | Refills: 3 | Status: SHIPPED | OUTPATIENT
Start: 2024-01-08 | End: 2024-05-22

## 2024-03-23 ENCOUNTER — TELEPHONE (OUTPATIENT)
Dept: FAMILY MEDICINE | Facility: CLINIC | Age: 69
End: 2024-03-23
Payer: COMMERCIAL

## 2024-03-23 NOTE — TELEPHONE ENCOUNTER
Reason for Call:  call back    Detailed comments: Haven't had a response to the med request    Phone Number Patient can be reached at: Cell number on file:    Telephone Information:   Mobile [phone]       Best Time: anytime    Can we leave a detailed message on this number? YES    Call taken on 3/23/2024 at 11:13 AM by HELIO KRAFT

## 2024-03-25 ENCOUNTER — OFFICE VISIT (OUTPATIENT)
Dept: FAMILY MEDICINE | Facility: CLINIC | Age: 69
End: 2024-03-25
Payer: COMMERCIAL

## 2024-03-25 VITALS
SYSTOLIC BLOOD PRESSURE: 128 MMHG | RESPIRATION RATE: 18 BRPM | OXYGEN SATURATION: 97 % | BODY MASS INDEX: 31 KG/M2 | WEIGHT: 175 LBS | HEART RATE: 62 BPM | DIASTOLIC BLOOD PRESSURE: 82 MMHG | TEMPERATURE: 98 F
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
## 2024-03-25 DIAGNOSIS — F41.1 GAD (GENERALIZED ANXIETY DISORDER): ICD-10-CM

## 2024-03-25 PROCEDURE — 99213 OFFICE O/P EST LOW 20 MIN: CPT | Performed by: PHYSICIAN ASSISTANT

## 2024-03-25 RX ORDER — MINERAL OIL/HYDROPHIL PETROLAT
OINTMENT (GRAM) TOPICAL PRN
Qty: 50 G | Refills: 0 | Status: SHIPPED | OUTPATIENT
Start: 2024-03-25 | End: 2024-05-22

## 2024-03-25 RX ORDER — SERTRALINE HYDROCHLORIDE 100 MG/1
100 TABLET, FILM COATED ORAL DAILY
Qty: 60 TABLET | Refills: 0 | Status: SHIPPED | OUTPATIENT
Start: 2024-03-25 | End: 2024-05-22

## 2024-03-25 RX ORDER — TRAZODONE HYDROCHLORIDE 100 MG/1
100 TABLET ORAL AT BEDTIME
COMMUNITY

## 2024-03-25 ASSESSMENT — ENCOUNTER SYMPTOMS
WEAKNESS: 0
RHINORRHEA: 0
HEADACHES: 1
VOMITING: 0
NAUSEA: 0
HEADACHES: 0
FACIAL ASYMMETRY: 0
NUMBNESS: 0
DIZZINESS: 0
FATIGUE: 0
ABDOMINAL PAIN: 0
LIGHT-HEADEDNESS: 0
DIAPHORESIS: 0
FEVER: 0
WOUND: 1
SPEECH DIFFICULTY: 0

## 2024-03-25 NOTE — PROGRESS NOTES
Patient presents with:  Dizziness: Out of medication   skin infection: Infection on arms from dog scratches        Clinical Decision Making:  Patient with past medical history of generalized anxiety disorder without his medication for 1 week.  I suspect the throbbing sensation which patient is not calling pain is likely secondary to withdrawal from sertraline.  Patient restarted on it today.  He will continue to plan to follow-up with his primary care team for future refills.    Patient has superficial dog scratches.  No findings strongly indicative of cellulitis.  We discussed wound care.  He had been using hydrogen peroxide, which I directed him to discontinue as it may delay wound healing.  Aquaphor prescribed to help.      ICD-10-CM    1. Dog scratch  W54.8XXA mineral oil-hydrophilic petrolatum (AQUAPHOR) external ointment      2. JOHANA (generalized anxiety disorder)  F41.1 sertraline (ZOLOFT) 100 MG tablet          Patient Instructions   Start your Sertraline.   Stop using Hydrogen Peroxide. Just clean the wound with water. Pat gently dry and apply Aquaphor once daily to the wounds.   Follow up if new or worsening redness develops.     HPI:  Ming Garnett is a 68 year old male who presents today complaining of feeling throbbing in his head for the last 5 days.  Patient states that he ran out of his sertraline 7 days ago and has been attempting to try to get refills.  In addition patient also has multiple dog scratches without any purulent drainage, fever or other signs of infection.    History obtained from the patient.    Problem List:  2022-11: Albuminuria  2022-11: Benign prostatic hyperplasia with nocturia  2022-11: Cigarette smoker  2022-02: Mild major depression (H24)  2022-02: Stage 3a chronic kidney disease (H)  2020-09: Hyperlipidemia LDL goal <130  2016-09: Insomnia, unspecified type  2016-09: Erectile dysfunction, unspecified erectile dysfunction type  2009-12: HTN (hypertension), benign  2009-12:  Depression with anxiety      Past Medical History:   Diagnosis Date    Hearing problem     Hypertension     Renal disease     Sensorineural hearing loss     Sleep apnea        Social History     Tobacco Use    Smoking status: Every Day     Packs/day: 0.50     Years: 22.00     Additional pack years: 0.00     Total pack years: 11.00     Types: Cigarettes     Start date: 2000     Last attempt to quit: 2022     Years since quittin.3    Smokeless tobacco: Never   Substance Use Topics    Alcohol use: Yes     Alcohol/week: 0.0 standard drinks of alcohol     Comment: a few beers per day        Review of Systems   Constitutional:  Negative for fatigue and fever.   Eyes:  Negative for visual disturbance.   Gastrointestinal:  Negative for abdominal pain, nausea and vomiting.   Skin:  Positive for wound (dog scratches).   Neurological:  Positive for headaches (Intermittent throbbing pressure sensation, but patient did not call it pain). Negative for dizziness, syncope, speech difficulty, weakness and numbness.       Vitals:    24 1227   BP: 128/82   BP Location: Right arm   Patient Position: Sitting   Cuff Size: Adult Regular   Pulse: 62   Resp: 18   Temp: 98  F (36.7  C)   TempSrc: Oral   SpO2: 97%   Weight: 79.4 kg (175 lb)       Physical Exam  Skin:               At the end of the encounter, I discussed results, diagnosis, medications. Discussed red flags for immediate return to clinic/ER, as well as indications for follow up if no improvement. Patient understood and agreed to plan. Patient was stable for discharge.

## 2024-03-25 NOTE — PATIENT INSTRUCTIONS
Start your Sertraline.   Stop using Hydrogen Peroxide. Just clean the wound with water. Pat gently dry and apply Aquaphor once daily to the wounds.   Follow up if new or worsening redness develops.

## 2024-03-25 NOTE — PROGRESS NOTES
Patient presents with:  Dizziness: Out of medication   skin infection: Infection on arms from dog scratches        Clinical Decision Making:  DDX: SAH, SDH, carotid stenosis, embolic stroke,  Anxiety, withdrawal from sertraline.      ICD-10-CM    1. Dog scratch  W54.8XXA mineral oil-hydrophilic petrolatum (AQUAPHOR) external ointment      2. JOHANA (generalized anxiety disorder)  F41.1 sertraline (ZOLOFT) 100 MG tablet      Symptoms that are most consistent with a withdrawal-like form from the sertraline as he has been out for 7 days after being on medication for multiple years.  Patient does have a follow-up appointment with his primary care on May 22.  Patient did try to get a hold of his primary through telephone message but was unable to be seen prior.  Patient's vitals were within normal limits, patient is not on blood thinners.  He is a current smoker, age and sex are his only risk factors for stroke.  I do not think that this is likely to be a stroke or an intracranial hemorrhage as he does not have any neurological symptoms at this time.  Prescribed a 60-day supply of sertraline to aid with getting him to his primary care appointment.  In addition dog scratches do not appear to be infected as erythema is localized around wound and other wounds had normal rate of healing associated with them.  Patient was previously using hydrogen peroxide which was recommended to stop use.I do not think that antibiotics are needed at this time and recommended using Aquaphor.      There are no Patient Instructions on file for this visit.    HPI:  Ming Garnett is a 68 year old male who presents today complaining of throbbing in his head for the last 5 days.  He ran out of his sertraline that he has taken for years prior.  He did try to request a refill on Wednesday but was not seen in time prior to arrival to walk in care.      History obtained from the patient.    Problem List:  2022-11: Albuminuria  2022-11: Benign  prostatic hyperplasia with nocturia  2022: Cigarette smoker  2022: Mild major depression (H24)  2022: Stage 3a chronic kidney disease (H)  2020: Hyperlipidemia LDL goal <130  2016: Insomnia, unspecified type  2016: Erectile dysfunction, unspecified erectile dysfunction type  2009: HTN (hypertension), benign  2009: Depression with anxiety      Past Medical History:   Diagnosis Date    Hearing problem     Hypertension     Renal disease     Sensorineural hearing loss     Sleep apnea        Social History     Tobacco Use    Smoking status: Every Day     Packs/day: 0.50     Years: 22.00     Additional pack years: 0.00     Total pack years: 11.00     Types: Cigarettes     Start date: 2000     Last attempt to quit: 2022     Years since quittin.3    Smokeless tobacco: Never   Substance Use Topics    Alcohol use: Yes     Alcohol/week: 0.0 standard drinks of alcohol     Comment: a few beers per day        Review of Systems   Constitutional:  Negative for diaphoresis, fatigue and fever.   HENT:  Negative for rhinorrhea.    Gastrointestinal:  Negative for abdominal pain.   Neurological:  Negative for dizziness, syncope, facial asymmetry, speech difficulty, light-headedness, numbness and headaches.        Positive for throbbing       Vitals:    24 1227   BP: 128/82   BP Location: Right arm   Patient Position: Sitting   Cuff Size: Adult Regular   Pulse: 62   Resp: 18   Temp: 98  F (36.7  C)   TempSrc: Oral   SpO2: 97%   Weight: 79.4 kg (175 lb)       Physical Exam  Cardiovascular:      Rate and Rhythm: Normal rate and regular rhythm.      Pulses: Normal pulses.      Heart sounds: Normal heart sounds.   Pulmonary:      Effort: Pulmonary effort is normal.      Breath sounds: Normal breath sounds.   Musculoskeletal:         General: Normal range of motion.      Cervical back: Full passive range of motion without pain, normal range of motion and neck supple.   Skin:     General: Skin is warm  and dry.   Neurological:      General: No focal deficit present.      Mental Status: He is alert and oriented to person, place, and time.      GCS: GCS eye subscore is 4. GCS verbal subscore is 5. GCS motor subscore is 6.      Cranial Nerves: Cranial nerves 2-12 are intact.      Sensory: Sensation is intact.      Motor: Motor function is intact.      Coordination: Coordination is intact.      Gait: Gait is intact.         Results:  No results found for any visits on 03/25/24.      At the end of the encounter, I discussed results, diagnosis, medications. Discussed red flags for immediate return to clinic/ER, as well as indications for follow up if no improvement. Patient understood and agreed to plan. Patient was stable for discharge.

## 2024-03-30 DIAGNOSIS — Z91.89 FRAMINGHAM CARDIAC RISK >20% IN NEXT 10 YEARS: ICD-10-CM

## 2024-04-01 RX ORDER — ATORVASTATIN CALCIUM 40 MG/1
40 TABLET, FILM COATED ORAL DAILY
Qty: 60 TABLET | Refills: 0 | Status: SHIPPED | OUTPATIENT
Start: 2024-04-01 | End: 2024-05-22

## 2024-05-22 ENCOUNTER — OFFICE VISIT (OUTPATIENT)
Dept: FAMILY MEDICINE | Facility: CLINIC | Age: 69
End: 2024-05-22
Payer: COMMERCIAL

## 2024-05-22 VITALS
TEMPERATURE: 98.2 F | OXYGEN SATURATION: 98 % | HEIGHT: 67 IN | DIASTOLIC BLOOD PRESSURE: 72 MMHG | RESPIRATION RATE: 16 BRPM | SYSTOLIC BLOOD PRESSURE: 112 MMHG | BODY MASS INDEX: 27.5 KG/M2 | WEIGHT: 175.2 LBS | HEART RATE: 70 BPM

## 2024-05-22 DIAGNOSIS — M19.012 BILATERAL SHOULDER REGION ARTHRITIS: ICD-10-CM

## 2024-05-22 DIAGNOSIS — I10 BENIGN ESSENTIAL HYPERTENSION: ICD-10-CM

## 2024-05-22 DIAGNOSIS — N18.31 STAGE 3A CHRONIC KIDNEY DISEASE (H): ICD-10-CM

## 2024-05-22 DIAGNOSIS — M19.011 BILATERAL SHOULDER REGION ARTHRITIS: ICD-10-CM

## 2024-05-22 DIAGNOSIS — Z13.9 ENCOUNTER FOR SCREENING INVOLVING SOCIAL DETERMINANTS OF HEALTH (SDOH): ICD-10-CM

## 2024-05-22 DIAGNOSIS — Z91.89 FRAMINGHAM CARDIAC RISK >20% IN NEXT 10 YEARS: ICD-10-CM

## 2024-05-22 DIAGNOSIS — Z00.00 ENCOUNTER FOR MEDICARE ANNUAL WELLNESS EXAM: Primary | ICD-10-CM

## 2024-05-22 DIAGNOSIS — F17.210 CIGARETTE SMOKER: ICD-10-CM

## 2024-05-22 DIAGNOSIS — I10 HTN (HYPERTENSION), BENIGN: ICD-10-CM

## 2024-05-22 DIAGNOSIS — F10.20 ALCOHOL DEPENDENCE, DAILY USE (H): ICD-10-CM

## 2024-05-22 DIAGNOSIS — R73.9 HYPERGLYCEMIA: ICD-10-CM

## 2024-05-22 DIAGNOSIS — M17.0 PRIMARY OSTEOARTHRITIS OF BOTH KNEES: ICD-10-CM

## 2024-05-22 DIAGNOSIS — F32.0 MILD MAJOR DEPRESSION (H): ICD-10-CM

## 2024-05-22 DIAGNOSIS — Z12.5 SCREENING FOR PROSTATE CANCER: ICD-10-CM

## 2024-05-22 DIAGNOSIS — F51.01 PRIMARY INSOMNIA: ICD-10-CM

## 2024-05-22 DIAGNOSIS — F41.1 GAD (GENERALIZED ANXIETY DISORDER): ICD-10-CM

## 2024-05-22 DIAGNOSIS — E78.5 HYPERLIPIDEMIA LDL GOAL <130: ICD-10-CM

## 2024-05-22 DIAGNOSIS — Z12.11 SCREEN FOR COLON CANCER: ICD-10-CM

## 2024-05-22 LAB
ALBUMIN SERPL BCG-MCNC: 4.5 G/DL (ref 3.5–5.2)
ALP SERPL-CCNC: 111 U/L (ref 40–150)
ALT SERPL W P-5'-P-CCNC: 42 U/L (ref 0–70)
ANION GAP SERPL CALCULATED.3IONS-SCNC: 11 MMOL/L (ref 7–15)
AST SERPL W P-5'-P-CCNC: 39 U/L (ref 0–45)
BILIRUB SERPL-MCNC: 0.6 MG/DL
BUN SERPL-MCNC: 20.3 MG/DL (ref 8–23)
CALCIUM SERPL-MCNC: 10.1 MG/DL (ref 8.8–10.2)
CHLORIDE SERPL-SCNC: 103 MMOL/L (ref 98–107)
CHOLEST SERPL-MCNC: 155 MG/DL
CREAT SERPL-MCNC: 1.37 MG/DL (ref 0.67–1.17)
DEPRECATED HCO3 PLAS-SCNC: 25 MMOL/L (ref 22–29)
EGFRCR SERPLBLD CKD-EPI 2021: 55 ML/MIN/1.73M2
ERYTHROCYTE [DISTWIDTH] IN BLOOD BY AUTOMATED COUNT: 12.6 % (ref 10–15)
FASTING STATUS PATIENT QL REPORTED: YES
FASTING STATUS PATIENT QL REPORTED: YES
GLUCOSE SERPL-MCNC: 97 MG/DL (ref 70–99)
HBA1C MFR BLD: 5.4 % (ref 0–5.6)
HCT VFR BLD AUTO: 44.2 % (ref 40–53)
HDLC SERPL-MCNC: 55 MG/DL
HGB BLD-MCNC: 15.3 G/DL (ref 13.3–17.7)
LDLC SERPL CALC-MCNC: 72 MG/DL
MCH RBC QN AUTO: 31.9 PG (ref 26.5–33)
MCHC RBC AUTO-ENTMCNC: 34.6 G/DL (ref 31.5–36.5)
MCV RBC AUTO: 92 FL (ref 78–100)
NONHDLC SERPL-MCNC: 100 MG/DL
PLATELET # BLD AUTO: 270 10E3/UL (ref 150–450)
POTASSIUM SERPL-SCNC: 4.5 MMOL/L (ref 3.4–5.3)
PROT SERPL-MCNC: 7.8 G/DL (ref 6.4–8.3)
PSA SERPL DL<=0.01 NG/ML-MCNC: 1.52 NG/ML (ref 0–6.5)
RBC # BLD AUTO: 4.79 10E6/UL (ref 4.4–5.9)
SODIUM SERPL-SCNC: 139 MMOL/L (ref 135–145)
TRIGL SERPL-MCNC: 141 MG/DL
WBC # BLD AUTO: 10.4 10E3/UL (ref 4–11)

## 2024-05-22 PROCEDURE — 99214 OFFICE O/P EST MOD 30 MIN: CPT | Mod: 25 | Performed by: NURSE PRACTITIONER

## 2024-05-22 PROCEDURE — 80053 COMPREHEN METABOLIC PANEL: CPT | Performed by: NURSE PRACTITIONER

## 2024-05-22 PROCEDURE — 80061 LIPID PANEL: CPT | Performed by: NURSE PRACTITIONER

## 2024-05-22 PROCEDURE — 82570 ASSAY OF URINE CREATININE: CPT | Performed by: NURSE PRACTITIONER

## 2024-05-22 PROCEDURE — 36415 COLL VENOUS BLD VENIPUNCTURE: CPT | Performed by: NURSE PRACTITIONER

## 2024-05-22 PROCEDURE — G0439 PPPS, SUBSEQ VISIT: HCPCS | Performed by: NURSE PRACTITIONER

## 2024-05-22 PROCEDURE — 82043 UR ALBUMIN QUANTITATIVE: CPT | Performed by: NURSE PRACTITIONER

## 2024-05-22 PROCEDURE — 83036 HEMOGLOBIN GLYCOSYLATED A1C: CPT | Performed by: NURSE PRACTITIONER

## 2024-05-22 PROCEDURE — 85027 COMPLETE CBC AUTOMATED: CPT | Performed by: NURSE PRACTITIONER

## 2024-05-22 PROCEDURE — G0103 PSA SCREENING: HCPCS | Performed by: NURSE PRACTITIONER

## 2024-05-22 RX ORDER — ZOLPIDEM TARTRATE 5 MG/1
2.5 TABLET ORAL
Qty: 30 TABLET | Refills: 3 | Status: SHIPPED | OUTPATIENT
Start: 2024-05-22 | End: 2024-06-26

## 2024-05-22 RX ORDER — SERTRALINE HYDROCHLORIDE 100 MG/1
150 TABLET, FILM COATED ORAL DAILY
Qty: 135 TABLET | Refills: 4 | Status: SHIPPED | OUTPATIENT
Start: 2024-05-22

## 2024-05-22 RX ORDER — LOSARTAN POTASSIUM 50 MG/1
TABLET ORAL
Qty: 90 TABLET | Refills: 1 | Status: SHIPPED | OUTPATIENT
Start: 2024-05-22

## 2024-05-22 RX ORDER — ATORVASTATIN CALCIUM 40 MG/1
40 TABLET, FILM COATED ORAL DAILY
Qty: 60 TABLET | Refills: 0 | Status: SHIPPED | OUTPATIENT
Start: 2024-05-22 | End: 2024-07-19

## 2024-05-22 RX ORDER — ATENOLOL 50 MG/1
50 TABLET ORAL DAILY
Qty: 90 TABLET | Refills: 3 | Status: SHIPPED | OUTPATIENT
Start: 2024-05-22

## 2024-05-22 RX ORDER — HYDROCHLOROTHIAZIDE 25 MG/1
TABLET ORAL
Qty: 30 TABLET | Refills: 0 | Status: SHIPPED | OUTPATIENT
Start: 2024-05-22 | End: 2024-06-26

## 2024-05-22 RX ORDER — AMLODIPINE BESYLATE 10 MG/1
TABLET ORAL
Qty: 90 TABLET | Refills: 3 | Status: SHIPPED | OUTPATIENT
Start: 2024-05-22

## 2024-05-22 RX ORDER — RESPIRATORY SYNCYTIAL VIRUS VACCINE 120MCG/0.5
0.5 KIT INTRAMUSCULAR ONCE
Qty: 1 EACH | Refills: 0 | Status: CANCELLED | OUTPATIENT
Start: 2024-05-22 | End: 2024-05-22

## 2024-05-22 SDOH — HEALTH STABILITY: PHYSICAL HEALTH: ON AVERAGE, HOW MANY DAYS PER WEEK DO YOU ENGAGE IN MODERATE TO STRENUOUS EXERCISE (LIKE A BRISK WALK)?: 1 DAY

## 2024-05-22 ASSESSMENT — ASTHMA QUESTIONNAIRES
ACT_TOTALSCORE: 25
QUESTION_3 LAST FOUR WEEKS HOW OFTEN DID YOUR ASTHMA SYMPTOMS (WHEEZING, COUGHING, SHORTNESS OF BREATH, CHEST TIGHTNESS OR PAIN) WAKE YOU UP AT NIGHT OR EARLIER THAN USUAL IN THE MORNING: NOT AT ALL
QUESTION_4 LAST FOUR WEEKS HOW OFTEN HAVE YOU USED YOUR RESCUE INHALER OR NEBULIZER MEDICATION (SUCH AS ALBUTEROL): NOT AT ALL
ACT_TOTALSCORE: 25
QUESTION_2 LAST FOUR WEEKS HOW OFTEN HAVE YOU HAD SHORTNESS OF BREATH: NOT AT ALL
QUESTION_1 LAST FOUR WEEKS HOW MUCH OF THE TIME DID YOUR ASTHMA KEEP YOU FROM GETTING AS MUCH DONE AT WORK, SCHOOL OR AT HOME: NONE OF THE TIME
QUESTION_5 LAST FOUR WEEKS HOW WOULD YOU RATE YOUR ASTHMA CONTROL: COMPLETELY CONTROLLED

## 2024-05-22 ASSESSMENT — SOCIAL DETERMINANTS OF HEALTH (SDOH): HOW OFTEN DO YOU GET TOGETHER WITH FRIENDS OR RELATIVES?: THREE TIMES A WEEK

## 2024-05-22 ASSESSMENT — PATIENT HEALTH QUESTIONNAIRE - PHQ9
SUM OF ALL RESPONSES TO PHQ QUESTIONS 1-9: 2
SUM OF ALL RESPONSES TO PHQ QUESTIONS 1-9: 2
10. IF YOU CHECKED OFF ANY PROBLEMS, HOW DIFFICULT HAVE THESE PROBLEMS MADE IT FOR YOU TO DO YOUR WORK, TAKE CARE OF THINGS AT HOME, OR GET ALONG WITH OTHER PEOPLE: NOT DIFFICULT AT ALL

## 2024-05-22 ASSESSMENT — ACTIVITIES OF DAILY LIVING (ADL): CURRENT_FUNCTION: NO ASSISTANCE NEEDED

## 2024-05-22 NOTE — ASSESSMENT & PLAN NOTE
Discussed DASH diet, patient would like to be on this amount medications possible.  Decreasing losartan today from 150 mg given blood pressure is 112/72 today.  Patient to continue 10 mg of amlodipine, 25 mg of hydrochlorothiazide and 50 mg of atenolol.  Of note patient does have chronic kidney disease stage IIIb.  Rechecking CMP today.    Patient to follow-up with me in 4 weeks for blood pressure recheck

## 2024-05-22 NOTE — PATIENT INSTRUCTIONS
"Preventive Care Advice   This is general advice we often give to help people stay healthy. Your care team may have specific advice just for you. Please talk to your care team about your own preventive care needs.  Lifestyle  Exercise at least 150 minutes each week (30 minutes a day, 5 days a week).  Do muscle strengthening activities 2 days a week. These help control your weight and prevent disease.  No smoking.  Wear sunscreen to prevent skin cancer.  Have your home tested for radon every 2 to 5 years. Radon is a colorless, odorless gas that can harm your lungs. To learn more, go to www.health.[facility].mn. and search for \"Radon in Homes.\"  Keep guns unloaded and locked up in a safe place like a safe or gun vault, or, use a gun lock and hide the keys. Always lock away bullets separately. To learn more, visit [place].gov and search for \"safe gun storage.\"  Nutrition  Eat 5 or more servings of fruits and vegetables each day.  Try wheat bread, brown rice and whole grain pasta (instead of white bread, rice, and pasta).  Get enough calcium and vitamin D. Check the label on foods and aim for 100% of the RDA (recommended daily allowance).  Regular exams  Have a dental exam and cleaning every 6 months.  See your health care team every year to talk about:  Any changes in your health.  Any medicines your care team has prescribed.  Preventive care, family planning, and ways to prevent chronic diseases.  Shots (vaccines)   HPV shots (up to age 26), if you've never had them before.  Hepatitis B shots (up to age 59), if you've never had them before.  COVID-19 shot: Get this shot when it's due.  Flu shot: Get a flu shot every year.  Tetanus shot: Get a tetanus shot every 10 years.  Pneumococcal, hepatitis A, and RSV shots: Ask your care team if you need these based on your risk.  Shingles shot (for age 50 and up).  General health tests  Diabetes screening:  Starting at age 35, Get screened for diabetes at least every 3 years.  If " you are younger than age 35, ask your care team if you should be screened for diabetes.  Cholesterol test: At age 39, start having a cholesterol test every 5 years, or more often if advised.  Bone density scan (DEXA): At age 50, ask your care team if you should have this scan for osteoporosis (brittle bones).  Hepatitis C: Get tested at least once in your life.  Abdominal aortic aneurysm screening: Talk to your doctor about having this screening if you:  Have ever smoked; and  Are biologically male; and  Are between the ages of 65 and 75.  STIs (sexually transmitted infections)  Before age 24: Ask your care team if you should be screened for STIs.  After age 24: Get screened for STIs if you're at risk. You are at risk for STIs (including HIV) if:  You are sexually active with more than one person.  You don't use condoms every time.  You or a partner was diagnosed with a sexually transmitted infection.  If you are at risk for HIV, ask about PrEP medicine to prevent HIV.  Get tested for HIV at least once in your life, whether you are at risk for HIV or not.  Cancer screening tests  Cervical cancer screening: If you have a cervix, begin getting regular cervical cancer screening tests at age 21. Most people who have regular screenings with normal results can stop after age 65. Talk about this with your provider.  Breast cancer scan (mammogram): If you've ever had breasts, begin having regular mammograms starting at age 40. This is a scan to check for breast cancer.  Colon cancer screening: It is important to start screening for colon cancer at age 45.  Have a colonoscopy test every 10 years (or more often if you're at risk) Or, ask your provider about stool tests like a FIT test every year or Cologuard test every 3 years.  To learn more about your testing options, visit: [email or web address].  For help making a decision, visit: [email or web address].  Prostate cancer screening test: If you have a prostate and are age 55  to 69, ask your provider if you would benefit from a yearly prostate cancer screening test.  Lung cancer screening: If you are a current or former smoker age 50 to 80, ask your care team if ongoing lung cancer screenings are right for you.  For informational purposes only. Not to replace the advice of your health care provider. Copyright   2023 [employer]. All rights reserved. Clinically reviewed by the [facility] Transitions Program. [employer] [id number] - REV 04/24.    Preventing Falls: Care Instructions  Injuries and health problems such as trouble walking or poor eyesight can increase your risk of falling. So can some medicines. But there are things you can do to help prevent falls. You can exercise to get stronger. You can also arrange your home to make it safer.    Talk to your doctor about the medicines you take. Ask if any of them increase the risk of falls and whether they can be changed or stopped.   Try to exercise regularly. It can help improve your strength and balance. This can help lower your risk of falling.     Practice fall safety and prevention.    Wear low-heeled shoes that fit well and give your feet good support. Talk to your doctor if you have foot problems that make this hard.  Carry a cellphone or wear a medical alert device that you can use to call for help.  Use stepladders instead of chairs to reach high objects. Don't climb if you're at risk for falls. Ask for help, if needed.  Wear the correct eyeglasses, if you need them.    Make your home safer.    Remove rugs, cords, clutter, and furniture from walkways.  Keep your house well lit. Use night-lights in hallways and bathrooms.  Install and use sturdy handrails on stairways.  Wear nonskid footwear, even inside. Don't walk barefoot or in socks without shoes.    Be safe outside.    Use handrails, curb cuts, and ramps whenever possible.  Keep your hands free by using a shoulder bag or backpack.  Try to walk in well-lit  "areas. Watch out for uneven ground, changes in pavement, and debris.  Be careful in the winter. Walk on the grass or gravel when sidewalks are slippery. Use de-icer on steps and walkways. Add non-slip devices to shoes.    Put grab bars and nonskid mats in your shower or tub and near the toilet. Try to use a shower chair or bath bench when bathing.   Get into a tub or shower by putting in your weaker leg first. Get out with your strong side first. Have a phone or medical alert device in the bathroom with you.   Where can you learn more?  Go to https://www."[employer].net/patiented  Enter G117 in the search box to learn more about \"Preventing Falls: Care Instructions.\"  Current as of: July 17, 2023               Content Version: 14.0    8778-7470 [employer].   Care instructions adapted under license by your healthcare professional. If you have questions about a medical condition or this instruction, always ask your healthcare professional. [employer] disclaims any warranty or liability for your use of this information.      Hearing Loss: Care Instructions  Overview     Hearing loss is a sudden or slow decrease in how well you hear. It can range from slight to profound. Permanent hearing loss can occur with aging. It also can happen when you are exposed long-term to loud noise. Examples include listening to loud music, riding motorcycles, or being around other loud machines.  Hearing loss can affect your work and home life. It can make you feel lonely or depressed. You may feel that you have lost your independence. But hearing aids and other devices can help you hear better and feel connected to others.  Follow-up care is a key part of your treatment and safety. Be sure to make and go to all appointments, and call your doctor if you are having problems. It's also a good idea to know your test results and keep a list of the medicines you take.  How can you care for yourself at home?  Avoid " loud noises whenever possible. This helps keep your hearing from getting worse.  Always wear hearing protection around loud noises.  Wear a hearing aid as directed.  A professional can help you pick a hearing aid that will work best for you.  You can also get hearing aids over the counter for mild to moderate hearing loss.  Have hearing tests as your doctor suggests. They can show whether your hearing has changed. Your hearing aid may need to be adjusted.  Use other devices as needed. These may include:  Telephone amplifiers and hearing aids that can connect to a television, stereo, radio, or microphone.  Devices that use lights or vibrations. These alert you to the doorbell, a ringing telephone, or a baby monitor.  Television closed-captioning. This shows the words at the bottom of the screen. Most new TVs can do this.  TTY (text telephone). This lets you type messages back and forth on the telephone instead of talking or listening. These devices are also called TDD. When messages are typed on the keyboard, they are sent over the phone line to a receiving TTY. The message is shown on a monitor.  Use text messaging, social media, and email if it is hard for you to communicate by telephone.  Try to learn a listening technique called speechreading. It is not lipreading. You pay attention to people's gestures, expressions, posture, and tone of voice. These clues can help you understand what a person is saying. Face the person you are talking to, and have them face you. Make sure the lighting is good. You need to see the other person's face clearly.  Think about counseling if you need help to adjust to your hearing loss.  When should you call for help?  Watch closely for changes in your health, and be sure to contact your doctor if:    You think your hearing is getting worse.     You have new symptoms, such as dizziness or nausea.   Where can you learn more?  Go to https://www.healthwise.net/patiented  Enter R798 in the  "search box to learn more about \"Hearing Loss: Care Instructions.\"  Current as of: September 27, 2023               Content Version: 14.0    8192-2373 [employer].   Care instructions adapted under license by your healthcare professional. If you have questions about a medical condition or this instruction, always ask your healthcare professional. [employer] disclaims any warranty or liability for your use of this information.      Learning About Stress  What is stress?     Stress is your body's response to a hard situation. Your body can have a physical, emotional, or mental response. Stress is a fact of life for most people, and it affects everyone differently. What causes stress for you may not be stressful for someone else.  A lot of things can cause stress. You may feel stress when you go on a job interview, take a test, or run a race. This kind of short-term stress is normal and even useful. It can help you if you need to work hard or react quickly. For example, stress can help you finish an important job on time.  Long-term stress is caused by ongoing stressful situations or events. Examples of long-term stress include long-term health problems, ongoing problems at work, or conflicts in your family. Long-term stress can harm your health.  How does stress affect your health?  When you are stressed, your body responds as though you are in danger. It makes hormones that speed up your heart, make you breathe faster, and give you a burst of energy. This is called the fight-or-flight stress response. If the stress is over quickly, your body goes back to normal and no harm is done.  But if stress happens too often or lasts too long, it can have bad effects. Long-term stress can make you more likely to get sick, and it can make symptoms of some diseases worse. If you tense up when you are stressed, you may develop neck, shoulder, or low back pain. Stress is linked to high blood pressure and " heart disease.  Stress also harms your emotional health. It can make you noriega, tense, or depressed. Your relationships may suffer, and you may not do well at work or school.  What can you do to manage stress?  You can try these things to help manage stress:   Do something active. Exercise or activity can help reduce stress. Walking is a great way to get started. Even everyday activities such as housecleaning or yard work can help.  Try yoga or rafael chi. These techniques combine exercise and meditation. You may need some training at first to learn them.  Do something you enjoy. For example, listen to music or go to a movie. Practice your hobby or do volunteer work.  Meditate. This can help you relax, because you are not worrying about what happened before or what may happen in the future.  Do guided imagery. Imagine yourself in any setting that helps you feel calm. You can use online videos, books, or a teacher to guide you.  Do breathing exercises. For example:  From a standing position, bend forward from the waist with your knees slightly bent. Let your arms dangle close to the floor.  Breathe in slowly and deeply as you return to a standing position. Roll up slowly and lift your head last.  Hold your breath for just a few seconds in the standing position.  Breathe out slowly and bend forward from the waist.  Let your feelings out. Talk, laugh, cry, and express anger when you need to. Talking with supportive friends or family, a counselor, or a darrin leader about your feelings is a healthy way to relieve stress. Avoid discussing your feelings with people who make you feel worse.  Write. It may help to write about things that are bothering you. This helps you find out how much stress you feel and what is causing it. When you know this, you can find better ways to cope.  What can you do to prevent stress?  You might try some of these things to help prevent stress:  Manage your time. This helps you find time to do the  "things you want and need to do.  Get enough sleep. Your body recovers from the stresses of the day while you are sleeping.  Get support. Your family, friends, and community can make a difference in how you experience stress.  Limit your news feed. Avoid or limit time on social media or news that may make you feel stressed.  Do something active. Exercise or activity can help reduce stress. Walking is a great way to get started.  Where can you learn more?  Go to https://www.[employer].net/patiented  Enter N032 in the search box to learn more about \"Learning About Stress.\"  Current as of: October 24, 2023               Content Version: 14.0    1437-0379 [employer].   Care instructions adapted under license by your healthcare professional. If you have questions about a medical condition or this instruction, always ask your healthcare professional. [employer] disclaims any warranty or liability for your use of this information.      Bladder Training: Care Instructions  Your Care Instructions     Bladder training is used to treat urge incontinence and stress incontinence. Urge incontinence means that the need to urinate comes on so fast that you can't get to a toilet in time. Stress incontinence means that you leak urine because of pressure on your bladder. For example, it may happen when you laugh, cough, or lift something heavy.  Bladder training can increase how long you can wait before you have to urinate. It can also help your bladder hold more urine. And it can give you better control over the urge to urinate.  It is important to remember that bladder training takes a few weeks to a few months to make a difference. You may not see results right away, but don't give up.  Follow-up care is a key part of your treatment and safety. Be sure to make and go to all appointments, and call your doctor if you are having problems. It's also a good idea to know your test results and keep a list of the " medicines you take.  How can you care for yourself at home?  Work with your doctor to come up with a bladder training program that is right for you. You may use one or more of the following methods.  Delayed urination  In the beginning, try to keep from urinating for 5 minutes after you first feel the need to go.  While you wait, take deep, slow breaths to relax. Kegel exercises can also help you delay the need to go to the bathroom.  After some practice, when you can easily wait 5 minutes to urinate, try to wait 10 minutes before you urinate.  Slowly increase the waiting period until you are able to control when you have to urinate.  Scheduled urination  Empty your bladder when you first wake up in the morning.  Schedule times throughout the day when you will urinate.  Start by going to the bathroom every hour, even if you don't need to go.  Slowly increase the time between trips to the bathroom.  When you have found a schedule that works well for you, keep doing it.  If you wake up during the night and have to urinate, do it. Apply your schedule to waking hours only.  Kegel exercises  These tighten and strengthen pelvic muscles, which can help you control the flow of urine. (If doing these exercises causes pain, stop doing them and talk with your doctor.) To do Kegel exercises:  Squeeze your muscles as if you were trying not to pass gas. Or squeeze your muscles as if you were stopping the flow of urine. Your belly, legs, and buttocks shouldn't move.  Hold the squeeze for 3 seconds, then relax for 5 to 10 seconds.  Start with 3 seconds, then add 1 second each week until you are able to squeeze for 10 seconds.  Repeat the exercise 10 times a session. Do 3 to 8 sessions a day.  When should you call for help?  Watch closely for changes in your health, and be sure to contact your doctor if:    Your incontinence is getting worse.     You do not get better as expected.   Where can you learn more?  Go to  "https://www.[employer].net/patiented  Enter V684 in the search box to learn more about \"Bladder Training: Care Instructions.\"  Current as of: November 15, 2023               Content Version: 14.0    9077-7765 [employer].   Care instructions adapted under license by your healthcare professional. If you have questions about a medical condition or this instruction, always ask your healthcare professional. [employer] disclaims any warranty or liability for your use of this information.      Learning About Alcohol Use Disorder  What is alcohol use disorder?  Alcohol use disorder means that a person drinks alcohol even though it causes harm to themselves or others. It can range from mild to severe. The more symptoms of this disorder you have, the more severe it may be. People who have it may find it hard to control their use of alcohol.  People who have this disorder may argue with others about how much they're drinking. Their job may be affected because of drinking. They may drink when it's dangerous or illegal, such as when they drive. They also may have a strong need, or craving, to drink. They may feel like they must drink just to get by. Their drinking may increase their risk of getting hurt or being in a car crash.  Over time, drinking too much alcohol may cause health problems. These may include high blood pressure, liver problems, or problems with digestion.  What are the symptoms?  Maybe you've wondered about your alcohol habits or how to tell if your drinking is becoming a problem.  Here are some of the symptoms of alcohol use disorder. You may have it if you have two or more of the following symptoms:  You drink larger amounts of alcohol than you ever meant to. Or you've been drinking for a longer time than you ever meant to.  You can't cut down or control your use. Or you constantly wish you could cut down.  You spend a lot of time getting or drinking alcohol or recovering from its " effects.  You have strong cravings for alcohol.  You can no longer do your main jobs at work, at school, or at home.  You keep drinking alcohol, even though your use hurts your relationships.  You have stopped doing important activities because of your alcohol use.  You drink alcohol in situations where doing so is dangerous.  You keep drinking alcohol even though you know it's causing health problems.  You need more and more alcohol to get the same effect, or you get less effect from the same amount over time. This is called tolerance.  You have uncomfortable symptoms when you stop drinking alcohol or use less. This is called withdrawal.  Alcohol use disorder can range from mild to severe. The more symptoms you have, the more severe the disorder may be.  You might not realize that your drinking is a problem. You might not drink large amounts when you drink. Or you might go for days or weeks between drinking episodes. But even if you don't drink very often, your drinking could still be harmful and put you at risk.  How is alcohol use disorder treated?  Getting help is up to you. But you don't have to do it alone. There are many people and kinds of treatments that can help.  Treatment for alcohol use disorder can include:  Group therapy, one or more types of counseling, and alcohol education.  Medicines that help to:  Reduce withdrawal symptoms and help you safely stop drinking.  Reduce cravings for alcohol.  Support groups. These groups include Alcoholics Anonymous and [employer]t d[employer]Union (Self-Management and Recovery Training).  Some people are able to stop or cut back on drinking with help from a counselor. People who have moderate to severe alcohol use disorder may need medical treatment. They may need to stay in a hospital or treatment center.  You may have a treatment team to help you. This team may include a psychologist or psychiatrist, counselors, doctors, social workers, nurses, and a . A case  "manager helps plan and manage your treatment.  Follow-up care is a key part of your treatment and safety. Be sure to make and go to all appointments, and call your doctor if you are having problems. It's also a good idea to know your test results and keep a list of the medicines you take.  Where can you learn more?  Go to https://www.[employer].net/patiented  Enter H758 in the search box to learn more about \"Learning About Alcohol Use Disorder.\"  Current as of: November 15, 2023               Content Version: 14.0    1339-4239 [employer].   Care instructions adapted under license by your healthcare professional. If you have questions about a medical condition or this instruction, always ask your healthcare professional. [employer] disclaims any warranty or liability for your use of this information.      Substance Use Disorder: Care Instructions  Overview     You can improve your life and health by stopping your use of alcohol or drugs. When you don't drink or use drugs, you may feel and sleep better. You may get along better with your family, friends, and coworkers. There are medicines and programs that can help with substance use disorder.  How can you care for yourself at home?  Here are some ways to help you stay sober and prevent relapse.  If you have been given medicine to help keep you sober or reduce your cravings, be sure to take it exactly as prescribed.  Talk to your doctor about programs that can help you stop using drugs or drinking alcohol.  Do not keep alcohol or drugs in your home.  Plan ahead. Think about what you'll say if other people ask you to drink or use drugs. Try not to spend time with people who drink or use drugs.  Use the time and money spent on drinking or drugs to do something that's important to you.  Preventing a relapse  Have a plan to deal with relapse. Learn to recognize changes in your thinking that lead you to drink or use drugs. Get help before you " start to drink or use drugs again.  Try to stay away from situations, friends, or places that may lead you to drink or use drugs.  If you feel the need to drink alcohol or use drugs again, seek help right away. Call a trusted friend or family member. Some people get support from organizations such as Narcotics Anonymous or [employer] or from treatment facilities.  If you relapse, get help as soon as you can. Some people make a plan with another person that outlines what they want that person to do for them if they relapse. The plan usually includes how to handle the relapse and who to notify in case of relapse.  Don't give up. Remember that a relapse doesn't mean that you have failed. Use the experience to learn the triggers that lead you to drink or use drugs. Then quit again. Recovery is a lifelong process. Many people have several relapses before they are able to quit for good.  Follow-up care is a key part of your treatment and safety. Be sure to make and go to all appointments, and call your doctor if you are having problems. It's also a good idea to know your test results and keep a list of the medicines you take.  When should you call for help?   Call 911  anytime you think you may need emergency care. For example, call if you or someone else:    Has overdosed or has withdrawal signs. Be sure to tell the emergency workers that you are or someone else is using or trying to quit using drugs. Overdose or withdrawal signs may include:  Losing consciousness.  Seizure.  Seeing or hearing things that aren't there (hallucinations).     Is thinking or talking about suicide or harming others.   Where to get help 24 hours a day, 7 days a week   If you or someone you know talks about suicide, self-harm, a mental health crisis, a substance use crisis, or any other kind of emotional distress, get help right away. You can:    Call the Suicide and Crisis Lifeline at 651.     Call 7-046-480-TALK (1-652.225.9365).     Text  "HOME to 349403 to access the Crisis Text Line.   Consider saving these numbers in your phone.  Go to [email or web address] for more information or to chat online.  Call your doctor now or seek immediate medical care if:    You are having withdrawal symptoms. These may include nausea or vomiting, sweating, shakiness, and anxiety.   Watch closely for changes in your health, and be sure to contact your doctor if:    You have a relapse.     You need more help or support to stop.   Where can you learn more?  Go to https://www.HItviews.net/patiented  Enter H573 in the search box to learn more about \"Substance Use Disorder: Care Instructions.\"  Current as of: November 15, 2023               Content Version: 14.0    5915-2590 Healthwise, [facility].   Care instructions adapted under license by your healthcare professional. If you have questions about a medical condition or this instruction, always ask your healthcare professional. Healthwise, [facility] disclaims any warranty or liability for your use of this information.      "

## 2024-05-22 NOTE — COMMUNITY RESOURCES LIST (ENGLISH)
May 22, 2024           YOUR PERSONALIZED LIST OF SERVICES & PROGRAMS           & SHELTER    Housing      [facility] - Shelter for families  [address] [place] [address] (Distance: 2.8 miles)  Phone: [phone]  Language: English, Polish  Fee: Free  Accessibility: Translation services      [facility] Shelter ([facility]  [address] [place] [address] (Distance: 2.4 miles)  Website: https://[facility].org/find-support/partner-organizations/housing-assistance/  Language: English  Fee: Free  Accessibility: Ada accessible      [facility] Health [facility] - [employer] [facility]  Phone: [phone]  Website: https://www.AccuVein/  Language: English, Hmong, Oromo, Cymro, Polish  Hours: Mon 9:00 AM - 5:00 PM Tue 9:00 AM - 5:00 PM Wed 9:00 AM - 5:00 PM Thu 9:00 AM - 5:00 PM Fri 9:00 AM - 5:00 PM  Fee: Insurance  Accessibility: Blind accommodation, Deaf or hard of hearing, Translation services  Transportation Options: Free transportation    Case Management      [facility] - Coordinated Access  [address] [place] [address] (Distance: 6.9 miles)  Phone: [phone]  Language: English  Fee: Free  Accessibility: Translation services, Ada accessible      [facility] - Housing search assistance  [address] [place] [address] (Distance: 6.7 miles)  Phone: [phone]) [phone]  Language: English, Anguillan, Kiswahili, Hmong  Fee: Free  Accessibility: Ada accessible, Blind accommodation, Deaf or hard of hearing, Translation services      [employer] - Housing Stabilization Services  Phone: [phone]  Website: https://[employer].New[employer]Career/  Language: English  Hours: Mon 8:00 AM - 4:00 PM Tue 8:00 AM - 4:00 PM Wed 8:00 AM - 4:00 PM Thu 8:00 AM - 4:00 PM Fri 8:00 AM - 4:00 PM  Fee: Free  Accessibility: Blind accommodation, Deaf or hard of hearing  Transportation Options: Free transportation    Payment  Assistance      [employer] - Rent and mortgage payment assistance  [address] [place] [address] (Distance: 2.7 miles)  Language: English, Argentine  Fee: Free  Accessibility: Ada accessible      [facility] - Security Deposit Assistance  [address] [place] [address] (Distance: 3.9 miles)  Phone: [phone]) [phone]  Language: English  Fee: Free      [employer] - Keep the Key  Phone: (929) 726-1897  Website: https://www.vvq95-bnnwoieiuxzkjw.org/programs.html  Language: English  Hours: Mon 7:00 AM - 7:00 PM Tue 7:00 AM - 7:00 PM Wed 7:00 AM - 7:00 PM Thu 7:00 AM - 7:00 PM Fri 7:00 AM - 7:00 PM  Fee: Free    Drop-In Services      [employer] [facility] [employer]  [address] [facility] [address] [place] [address] (Distance: 3.7 miles)  Phone: [phone]  Language: English  Fee: Free      [facility] Warming or cooling center  [address] [place] [address] (Distance: 0.4 miles)  Phone: [phone]  Language: English, Argentine, Sri Lankan, Hmong  Fee: Free  Accessibility: Translation services      [employer] LAUNDRY LOVE  Website: http://www.laundrylove.org    Mediation & Eviction Prevention      Living - Housing Stabilization Services  [address] [place] [address] (Distance: 13.0 miles)  Phone: [phone]) [phone]  Website: https://wwwApprenNet  Language: Telugu, English, Kyrgyz  Fee: Insurance, Self pay      [facility] - Mortgage Foreclosure Prevention  [address] [place] [address] (Distance: 8.3 miles)  Phone: [phone]  Language: English  Fee: Free  Accessibility: Ada accessible      Line - Tenant Rights / Eviction Prevention  Website: https://La Russelllinemn.org/h-yred-lq-/  Language: English, Argentine            Bill Payment Assistance      County - Utility payment assistance  [address] [place] [address] (Distance: 3.9 miles)  Phone: [phone]) [phone]  Language: English, Argentine, Kyrgyz,  Hmong  Fee: Free  Accessibility: Ada accessible, Translation services      [employer] - Energy Assistance  [address] [place] [address] (Distance: 1.5 miles)  Phone: [phone]) [phone]  Language: English, Nepalese, Hmong, Russian  Fee: Free  Accessibility: Translation services      - Dislocated Worker/Adult [employer] Employment Program  Phone: (964) 662-5680  Email: mylene@[employer]  Website: https://[employer]/services/employment-services/dislocated-worker-program/  Language: English, Russian  Hours: Mon 8:00 AM - 4:30 PM Tue 8:00 AM - 4:30 PM Wed 8:00 AM - 4:30 PM Thu 8:00 AM - 4:30 PM Fri 8:00 AM - 4:30 PM  Fee: Free  Accessibility: Ada accessible               IMPORTANT NUMBERS & WEBSITES        Emergency Services  911  .   [facility]  211 http://211unitedway.org  .   Poison Control  (966) 912-5451 http://[place]poison.org http://wisconsinpoison.org  .     Suicide and Crisis Lifeline  988 http://988lifeline.org  .   Childhelp National Child Abuse Hotline  481.799.7222 http://Childhelphotline.org   .   National Sexual Assault Hotline  (515) 271-8554 (HOPE) http://Bulzi Median.org   .     National Runaway Safeline  (288) 201-4750 (RUNAWAY) http://FamilyLeafruSocialspiel.org  .   Pregnancy & Postpartum Support  Call/text 210-192-0508  [place]: http://ppsupport[place].org  [place]: http://Powtoon.com/wi  .   Substance Abuse National Helpline ([facility])  377-540-HELP (3462) http://Findtreatment.gov   .                DISCLAIMER: These resources have been generated via the [employer] Platform. [employer] does not endorse any service providers mentioned in this resource list. [employer] does not guarantee that the services mentioned in this resource list will be available to you or will improve your health or wellness.    [facility]

## 2024-05-22 NOTE — ASSESSMENT & PLAN NOTE
On sertraline 100 mg previously.  To 5/22/2024 increased to 150 mg.  Patient to follow-up in 4 weeks

## 2024-05-22 NOTE — PROGRESS NOTES
Preventive Care Visit  [facility]  Nicol CORNELIO Corrigan CNP, Family Medicine  May 22, 2024      Assessment & Plan   Problem List Items Addressed This Visit       Hyperlipidemia LDL goal <130    Relevant Medications    atorvastatin (LIPITOR) 40 MG tablet    Other Relevant Orders    Lipid panel reflex to direct LDL Non-fasting    Mild major depression (H24)    Relevant Medications    sertraline (ZOLOFT) 100 MG tablet    Stage 3a chronic kidney disease (H)    Relevant Orders    Albumin Random Urine Quantitative with Creat Ratio    CBC with platelets    Alcohol dependence, daily use (H)    Relevant Orders    Comprehensive metabolic panel (BMP + Alb, Alk Phos, ALT, AST, Total. Bili, TP)     Other Visit Diagnoses       Encounter for screening involving social determinants of health (SDoH)    -  Primary    Relevant Orders    Primary Care - Care Coordination Referral    JOHANA (generalized anxiety disorder)        Relevant Medications    sertraline (ZOLOFT) 100 MG tablet    Benign essential hypertension        Relevant Medications    losartan (COZAAR) 50 MG tablet    hydrochlorothiazide (HYDRODIURIL) 25 MG tablet    atenolol (TENORMIN) 50 MG tablet    amLODIPine (NORVASC) 10 MG tablet    Other Relevant Orders    Comprehensive metabolic panel (BMP + Alb, Alk Phos, ALT, AST, Total. Bili, TP)    Need for shingles vaccine        Need for vaccination against respiratory syncytial virus        Screen for colon cancer        Relevant Orders    Colonoscopy Screening  Referral    Primary insomnia        Relevant Medications    zolpidem (AMBIEN) 5 MG tablet    [place] cardiac risk >20% in next 10 years        Relevant Medications    atorvastatin (LIPITOR) 40 MG tablet    Bilateral shoulder region arthritis        Relevant Medications    diclofenac (VOLTAREN) 1 % topical gel    Primary osteoarthritis of both knees        Relevant Medications    diclofenac (VOLTAREN) 1 % topical gel    Encounter for  "Medicare annual wellness exam             We discussed healthy lifestyle, nutrition, cardiovascular risk reduction, self care, safety, sunscreen, and timing of cancer screening.  Health maintenance screening and immunizations reviewed with the patient.  Follow up yearly for the annual physical.      Patient has been advised of split billing requirements and indicates understanding: Yes       Nicotine/Tobacco Cessation  He reports that he has been smoking cigarettes. He started smoking about 24 years ago. He has a 12.2 pack-year smoking history. He has never used smokeless tobacco.  Nicotine/Tobacco Cessation Plan  Information offered: Patient not interested at this time      BMI  Estimated body mass index is 27.5 kg/m  as calculated from the following:    Height as of this encounter: 1.7 m (5' 6.93\").    Weight as of this encounter: 79.5 kg (175 lb 3.2 oz).   Weight management plan: Discussed healthy diet and exercise guidelines    Counseling  Appropriate preventive services were discussed with this patient, including applicable screening as appropriate for fall prevention, nutrition, physical activity, Tobacco-use cessation, weight loss and cognition.  Checklist reviewing preventive services available has been given to the patient.  Reviewed patient's diet, addressing concerns and/or questions.   He is at risk for lack of exercise and has been provided with information to increase physical activity for the benefit of his well-being.   The patient reports drinking more than one alcoholic drink per day and sometimes engages in binge or excessive drinking. The patient was counseled and given information about possible harmful effects of excessive alcohol intake as well as where to get help for alcohol problems. Information on urinary incontinence and treatment options given to patient.           Bishop Lai is a 68 year old, presenting for the following:  Annual Visit (Annual wellness, kidney back ache, sleep a " "lot, and medication refill.)        5/22/2024     8:20 AM   Additional Questions   Roomed by MARY ELLEN-LPN   Accompanied by NONE           Going through finances around his house and arguing with family and depression and stress has been high - would like to increase the depression medication    -having a hard time sleeping   Months ago lats used ambien  Trazodone doesn't work as well.      10/17/2023 08/30/2023 1 Zolpidem Tartrate 5 Mg Tablet 30.00 60 Me Elu [id number] Sup (6435) 1/3 0.13 LME Medicare [place]   09/02/2023 08/30/2023 1 Zolpidem Tartrate 5 Mg Tablet 30.00 60 Me Elu [id number] Sup (6435) 0/3 0.13 LME [place]   07/06/2023 05/12/2023 1 Zolpidem Tartrate 5 Mg Tablet 30.00 60 Me Elu [id number] Sup (6435) 1/1              Has a dog and the dog jumps on him and causes bruising - lab pitbull    Drinking afternoon 2 beers/day - not at night - it causes him to urinate more.   wake up 4-5 times/night to urinate - hx of prostate surgery. Never worked.   - has been given oxybutynin in the past - no longer taking this.     ED - long standing - not bothering him right now    Ibuprofen 600mg in the morning - for BL shoulder pain -   Bl knee OA - severe - suggest Bl TKR in both knees -by ortho - pt doesn't want to do this.         Health Care Directive  Patient does not have a Health Care Directive or Living Will: Discussed advance care planning with patient; information given to patient to review.    Healthy Habits:     In general, how would you rate your overall health?  Good    Frequency of exercise:  1 day/week    Duration of exercise:  30-45 minutes    Do you usually eat at least 4 servings of fruit and vegetables a day, include whole grains    & fiber and avoid regularly eating high fat or \"junk\" foods?  No    Taking medications regularly:  Yes    Barriers to taking medications:  None    Medication side effects:  None    Ability to successfully perform activities of daily living:  No assistance needed    Hearing Impairment:  " Difficulty following a conversation in a noisy restaurant or crowded room    In the past 6 months, have you been bothered by leaking of urine? Yes    In general, how would you rate your overall mental or emotional health?  Good    Additional concerns today:  Yes                5/22/2024   General Health   How would you rate your overall physical health? Good   Feel stress (tense, anxious, or unable to sleep) To some extent   (!) STRESS CONCERN      5/22/2024   Nutrition   Diet: Regular (no restrictions)         5/22/2024   Exercise   Days per week of moderate/strenous exercise 1 day   (!) EXERCISE CONCERN      5/22/2024   Social Factors   Frequency of gathering with friends or relatives Three times a week   Worry food won't last until get money to buy more No   Food not last or not have enough money for food? No   Do you have housing?  No   Are you worried about losing your housing? Yes   Lack of transportation? No   Unable to get utilities (heat,electricity)? Yes   Want help with housing or utility concern? (!) YES   (!) HOUSING CONCERN PRESENT(!) FINANCIAL RESOURCE STRAIN CONCERN      5/22/2024   Fall Risk   Fallen 2 or more times in the past year? Yes    No   Trouble with walking or balance? Yes    Yes   Gait Speed Test Interpretation Less than or equal to 5.00 seconds - PASS           5/22/2024   Activities of Daily Living- Home Safety   Needs help with the following daily activites None of the above   Safety concerns in the home None of the above         5/22/2024   Dental   Dentist two times every year? (!) DECLINE         5/22/2024   Hearing Screening   Hearing concerns? None of the above         5/22/2024   Driving Risk Screening   Patient/family members have concerns about driving No         5/22/2024   General Alertness/Fatigue Screening   Have you been more tired than usual lately? No         5/22/2024   Urinary Incontinence Screening   Bothered by leaking urine in past 6 months Yes         5/22/2024   TB  Screening   Were you born outside of the [place]? No       Today's PHQ-9 Score:       2024     8:08 AM   PHQ-9 SCORE   PHQ-9 Total Score MyChart 2 (Minimal depression)   PHQ-9 Total Score 2         2024   Substance Use   Alcohol more than 3/day or more than 7/wk Yes   How often do you have a drink containing alcohol 4 or more times a week   How many alcohol drinks on typical day 3 or 4   How often do you have 5+ drinks at one occasion Never   Audit 2/3 Score 1   How often not able to stop drinking once started Never   How often failed to do what normally expected Never   How often needed first drink in am after a heavy drinking session Never   How often feeling of guilt or remorse after drinking Never   How often unable to remember what happened the night before Never   Have you or someone else been injured because of your drinking No   Has anyone been concerned or suggested you cut down on drinking No   TOTAL SCORE - AUDIT 5   Do you have a current opioid prescription? No   How severe/bad is pain from 1 to 10? 3/10   Do you use any other substances recreationally? (!) ALCOHOL    (!) CANNABIS PRODUCTS     Social History     Tobacco Use    Smoking status: Every Day     Current packs/day: 0.00     Average packs/day: 0.5 packs/day for 22.8 years (11.4 ttl pk-yrs)     Types: Cigarettes     Start date: 2000     Last attempt to quit: 2022     Years since quittin.5    Smokeless tobacco: Never   Substance Use Topics    Alcohol use: Yes     Alcohol/week: 0.0 standard drinks of alcohol     Comment: a few beers per day     Drug use: Never       ASCVD Risk   The 10-year ASCVD risk score (Carlyle HAWKINS, et al., 2019) is: 17.2%    Values used to calculate the score:      Age: 68 years      Sex: Male      Is Non- : No      Diabetic: No      Tobacco smoker: Yes      Systolic Blood Pressure: 112 mmHg      Is BP treated: Yes      HDL Cholesterol: 50 mg/dL      Total Cholesterol: 142  mg/dL            Reviewed and updated as needed this visit by Provider                      Current providers sharing in care for this patient include:  Patient Care Team:  Nicol Corrigan APRN CNP as PCP - General (Family Medicine)  Amira Rosado PA-C as Physician Assistant (Physician Assistant)  Nina Adorno MD as Referring Physician (Family Medicine)  Estrella Brewer CNP as Nurse Practitioner (Urology)  Krystina Flynn RN as Specialty Care Coordinator (Urology)  Elio Nicole MD as Assigned Surgical Provider  Esteban Paniagua MD as Assigned Musculoskeletal Provider  Nicol Corrigan APRN CNP as Assigned PCP    The following health maintenance items are reviewed in Epic and correct as of today:  Health Maintenance   Topic Date Due    ASTHMA ACTION PLAN  Never done    RSV VACCINE (Pregnancy & 60+) (1 - 1-dose 60+ series) Never done    COLORECTAL CANCER SCREENING  03/04/2022    ZOSTER IMMUNIZATION (2 of 2) 03/21/2022    COVID-19 Vaccine (4 - 2023-24 season) 09/01/2023    MICROALBUMIN  11/08/2023    LUNG CANCER SCREENING  11/08/2023    BMP  05/12/2024    LIPID  05/12/2024    ANNUAL REVIEW OF HM ORDERS  05/12/2024    HEMOGLOBIN  01/03/2024    NICOTINE/TOBACCO CESSATION COUNSELING Q 1 YR  05/12/2024    MEDICARE ANNUAL WELLNESS VISIT  05/12/2024    INFLUENZA VACCINE (Season Ended) 09/01/2024    ASTHMA CONTROL TEST  11/22/2024    PHQ-9  11/22/2024    FALL RISK ASSESSMENT  05/22/2025    GLUCOSE  05/12/2026    ADVANCE CARE PLANNING  05/12/2028    DTAP/TDAP/TD IMMUNIZATION (3 - Td or Tdap) 04/24/2029    HEPATITIS C SCREENING  Completed    DEPRESSION ACTION PLAN  Completed    Pneumococcal Vaccine: 65+ Years  Completed    URINALYSIS  Completed    AORTIC ANEURYSM SCREENING (SYSTEM ASSIGNED)  Completed    IPV IMMUNIZATION  Aged Out    HPV IMMUNIZATION  Aged Out    MENINGITIS IMMUNIZATION  Aged Out    RSV MONOCLONAL ANTIBODY  Aged Out            Objective    Exam  /72   Pulse  "70   Temp 98.2  F (36.8  C) (Oral)   Resp 16   Ht 1.7 m (5' 6.93\")   Wt 79.5 kg (175 lb 3.2 oz)   SpO2 98%   BMI 27.50 kg/m     Estimated body mass index is 27.5 kg/m  as calculated from the following:    Height as of this encounter: 1.7 m (5' 6.93\").    Weight as of this encounter: 79.5 kg (175 lb 3.2 oz).    Physical Exam  GENERAL: alert and no distress  NECK: no adenopathy, no asymmetry, masses, or scars  RESP: lungs clear to auscultation - no rales, rhonchi or wheezes  CV: regular rate and rhythm, normal S1 S2, no S3 or S4, no murmur, click or rub, no peripheral edema  ABDOMEN: soft, nontender, no hepatosplenomegaly, no masses and bowel sounds normal  MS: Crepitus with decreased range of motion of bilateral shoulders, osteoarthritic swelling seen in bilateral joint line of the knees.  Bilateral knees with crepitus without locking, no lower extremity edema        5/22/2024   Mini Cog   Clock Draw Score 2 Normal   3 Item Recall 2 objects recalled   Mini Cog Total Score 4              Signed Electronically by: CORNELIO Bear CNP    Answers submitted by the patient for this visit:  Patient Health Questionnaire (Submitted on 5/22/2024)  If you checked off any problems, how difficult have these problems made it for you to do your work, take care of things at home, or get along with other people?: Not difficult at all  PHQ9 TOTAL SCORE: 2    "

## 2024-05-22 NOTE — ASSESSMENT & PLAN NOTE
Started smoking 10 years ago.  Today 5/22/2024 has a 12.5-pack-year history.  Currently smoking half pack a day.  Not wanting any intervention at this time

## 2024-05-22 NOTE — ASSESSMENT & PLAN NOTE
"5/22/24  Drinks \"a couple cans of beer every day\"  Does not wish for medical intervention at this time  Does not see a problem with his drinking at this time  -Discussed how alcohol affects his blood pressure, and mood  Will continue to reassess  "

## 2024-05-22 NOTE — ASSESSMENT & PLAN NOTE
Today patient asks about possible stem cell injections.  Discussed patient to see Ortho.  He opts not to do this today but will see Ortho for shoulder injections.  -Seen previously at outside Ortho location

## 2024-05-22 NOTE — ASSESSMENT & PLAN NOTE
5/22/24 -discussed trazodone 100 to 200 mg at night.  Discussed risk of serotonin syndrome with increase of sertraline.  Trazodone refilled today as well as Ambien.  Discussed patient side effects of Ambien and risk of Ambien at at age.  Risk for delirium and abnormal effects on this medication.  Patient has not had prior side effects of this medication.  Patient instructed to take Ambien only as needed for good night sleep.  I recommend maximum of 2 times a week.  Follow-up in 4 weeks

## 2024-05-22 NOTE — ASSESSMENT & PLAN NOTE
Discussed decreasing NSAIDs.  Currently patient taking ibuprofen in the morning 600 mg couple times a week  Added diclofenac gel to consider for replacement  -Checking CMP

## 2024-05-22 NOTE — ASSESSMENT & PLAN NOTE
Continue sparing use of Advil.  400 mg max.  Given topical diclofenac today.  Referral placed to sports medicine for possible injections.  Patient does have a history of having previous imaging studies of the shoulders with orthopedic specialist per patient -no imaging of the shoulder seen and chart reviewed today

## 2024-05-23 LAB
CREAT UR-MCNC: 153 MG/DL
MICROALBUMIN UR-MCNC: 1200 MG/L
MICROALBUMIN/CREAT UR: 784.31 MG/G CR (ref 0–17)

## 2024-05-24 ENCOUNTER — PATIENT OUTREACH (OUTPATIENT)
Dept: CARE COORDINATION | Facility: CLINIC | Age: 69
End: 2024-05-24
Payer: COMMERCIAL

## 2024-05-24 NOTE — PATIENT INSTRUCTIONS
Start doxycycline-an antibiotic  2 times a day for 10 days always take with food to prevent nausea vomiting for your bronchitis      Start prednisone 2 tablets daily for 5 days-do not take at night -will make it difficult to sleep     Continue your albuterol inhaler-   2 puffs in a.m. and bedtime and every 4-6 hours as needed for wheezing  if you are having a coughing spell you can take 2 puffs as needed to help decrease the cough        Start Claritin 10 mg a day for 30 days for congestion      Take tessalon perles ( benzonatate) pills for cough up to 3 times a day will not make you sleepy       Take benadryl ( diphenhydramine) at bedtime will help with congestion and cough will make you sleepy          Return to clinic or to ER if symptoms worsen     Follow up with your primary care provider or clinic in about 2-3 days  if your symptoms do not improve            
negative

## 2024-05-24 NOTE — LETTER
Dear Ming,    I am a clinic community health worker who works with CORNELIO Bear CNP with the [facility]. I have been trying to reach you recently to introduce Clinic Care Coordination. Below is a description of clinic care coordination and how I can further assist you.       The clinic care coordination team is made up of a registered nurse, , financial resource worker and community health worker who understand the health care system. The goal of clinic care coordination is to help you manage your health and improve access to the health care system. Our team works alongside your provider to assist you in determining your health and social needs. We can help you obtain health care and community resources, providing you with necessary information and education. We can work with you through any barriers and develop a care plan that helps coordinate and strengthen the communication between you and your care team.  Our services are voluntary and are offered without charge to you personally.    Please feel free to contact me with any questions or concerns regarding care coordination and what we can offer.      We are focused on providing you with the highest-quality healthcare experience possible.    Sincerely,     Mitra Laboy  Community Health Worker  [facility] Care Coordination   [place]scar Jarrett, [place], [place], [facility]  Office: [phone]

## 2024-05-24 NOTE — PROGRESS NOTES
Clinic Care Coordination Contact  [facility]/Voicemail    Clinical Data: Care Coordinator Outreach    Outreach Documentation Number of Outreach Attempt   5/24/2024   9:41 AM 1     Left message on patient's voicemail with call back information and requested return call.    Overview  SW Assessment-SDOH not discussed with patient, ETOH resources possibly    Plan: Care Coordinator will try to reach patient again in 1-2 business days.    Mitra Laboy  [facility] Health Worker  [facility] Care Coordination   [place], [place], [place], [facility]  Office: [phone]

## 2024-05-24 NOTE — RESULT ENCOUNTER NOTE
Very high microalbumin.,  And I would suggest that we do start you on that medication for kidney disease and for diabetes which is Jardiance/Farxiga.  Will discuss this at your follow-up appointment

## 2024-05-28 DIAGNOSIS — Z91.89 FRAMINGHAM CARDIAC RISK >20% IN NEXT 10 YEARS: ICD-10-CM

## 2024-05-28 RX ORDER — ATORVASTATIN CALCIUM 40 MG/1
40 TABLET, FILM COATED ORAL DAILY
Qty: 60 TABLET | Refills: 0 | OUTPATIENT
Start: 2024-05-28

## 2024-05-28 NOTE — PROGRESS NOTES
Clinic Care Coordination Contact  [facility]/Voicemail    Clinical Data: Care Coordinator Outreach    Outreach Documentation Number of Outreach Attempt   5/24/2024   9:41 AM 1   5/28/2024   8:45 AM 2     Left message on patient's voicemail with call back information and requested return call.    Plan: Care Coordinator will send care coordination introduction letter with care coordinator contact information and explanation of care coordination services via [employer]. Care Coordinator will do no further outreaches at this time.    Mitra Laboy  Community Health Worker  [facility] Care Coordination   [place], [place], [place], [facility]  Office: [phone]

## 2024-06-26 ENCOUNTER — OFFICE VISIT (OUTPATIENT)
Dept: FAMILY MEDICINE | Facility: CLINIC | Age: 69
End: 2024-06-26
Payer: COMMERCIAL

## 2024-06-26 VITALS
SYSTOLIC BLOOD PRESSURE: 108 MMHG | BODY MASS INDEX: 27.58 KG/M2 | OXYGEN SATURATION: 95 % | HEIGHT: 67 IN | WEIGHT: 175.7 LBS | TEMPERATURE: 98.4 F | HEART RATE: 78 BPM | RESPIRATION RATE: 18 BRPM | DIASTOLIC BLOOD PRESSURE: 70 MMHG

## 2024-06-26 DIAGNOSIS — N18.31 STAGE 3A CHRONIC KIDNEY DISEASE (H): Primary | ICD-10-CM

## 2024-06-26 DIAGNOSIS — I10 BENIGN ESSENTIAL HYPERTENSION: ICD-10-CM

## 2024-06-26 DIAGNOSIS — F51.01 PRIMARY INSOMNIA: ICD-10-CM

## 2024-06-26 DIAGNOSIS — R23.3 EASY BRUISING: ICD-10-CM

## 2024-06-26 DIAGNOSIS — F41.1 GAD (GENERALIZED ANXIETY DISORDER): ICD-10-CM

## 2024-06-26 PROCEDURE — G2211 COMPLEX E/M VISIT ADD ON: HCPCS | Performed by: NURSE PRACTITIONER

## 2024-06-26 PROCEDURE — 99214 OFFICE O/P EST MOD 30 MIN: CPT | Performed by: NURSE PRACTITIONER

## 2024-06-26 RX ORDER — HYDROCHLOROTHIAZIDE 12.5 MG/1
TABLET ORAL
Qty: 90 TABLET | Refills: 0 | Status: SHIPPED | OUTPATIENT
Start: 2024-06-26 | End: 2024-10-07

## 2024-06-26 RX ORDER — RESPIRATORY SYNCYTIAL VIRUS VACCINE 120MCG/0.5
0.5 KIT INTRAMUSCULAR ONCE
Qty: 1 EACH | Refills: 0 | Status: CANCELLED | OUTPATIENT
Start: 2024-06-26 | End: 2024-06-26

## 2024-06-26 RX ORDER — ZOLPIDEM TARTRATE 5 MG/1
5 TABLET ORAL
Qty: 30 TABLET | Refills: 3 | Status: SHIPPED | OUTPATIENT
Start: 2024-06-26

## 2024-07-19 DIAGNOSIS — Z91.89 FRAMINGHAM CARDIAC RISK >20% IN NEXT 10 YEARS: ICD-10-CM

## 2024-07-19 RX ORDER — ATORVASTATIN CALCIUM 40 MG/1
40 TABLET, FILM COATED ORAL DAILY
Qty: 60 TABLET | Refills: 0 | Status: SHIPPED | OUTPATIENT
Start: 2024-07-19 | End: 2024-10-07

## 2024-10-05 DIAGNOSIS — Z91.89 FRAMINGHAM CARDIAC RISK >20% IN NEXT 10 YEARS: ICD-10-CM

## 2024-10-06 DIAGNOSIS — I10 BENIGN ESSENTIAL HYPERTENSION: ICD-10-CM

## 2024-10-07 RX ORDER — ATORVASTATIN CALCIUM 40 MG/1
40 TABLET, FILM COATED ORAL DAILY
Qty: 90 TABLET | Refills: 1 | Status: SHIPPED | OUTPATIENT
Start: 2024-10-07

## 2024-10-07 RX ORDER — HYDROCHLOROTHIAZIDE 12.5 MG/1
TABLET ORAL
Qty: 90 TABLET | Refills: 3 | Status: SHIPPED | OUTPATIENT
Start: 2024-10-07

## 2024-11-07 ENCOUNTER — PATIENT OUTREACH (OUTPATIENT)
Dept: CARE COORDINATION | Facility: CLINIC | Age: 69
End: 2024-11-07
Payer: COMMERCIAL

## 2024-12-23 ENCOUNTER — LAB (OUTPATIENT)
Dept: LAB | Facility: CLINIC | Age: 69
End: 2024-12-23
Payer: COMMERCIAL

## 2024-12-23 DIAGNOSIS — N18.31 STAGE 3A CHRONIC KIDNEY DISEASE (H): ICD-10-CM

## 2024-12-23 DIAGNOSIS — I10 BENIGN ESSENTIAL HYPERTENSION: ICD-10-CM

## 2024-12-23 LAB
ANION GAP SERPL CALCULATED.3IONS-SCNC: 12 MMOL/L (ref 7–15)
BUN SERPL-MCNC: 24.7 MG/DL (ref 8–23)
CALCIUM SERPL-MCNC: 10.4 MG/DL (ref 8.8–10.4)
CHLORIDE SERPL-SCNC: 101 MMOL/L (ref 98–107)
CREAT SERPL-MCNC: 1.36 MG/DL (ref 0.67–1.17)
CREAT UR-MCNC: 164 MG/DL
EGFRCR SERPLBLD CKD-EPI 2021: 56 ML/MIN/1.73M2
GLUCOSE SERPL-MCNC: 106 MG/DL (ref 70–99)
HCO3 SERPL-SCNC: 24 MMOL/L (ref 22–29)
MICROALBUMIN UR-MCNC: 1028 MG/L
MICROALBUMIN/CREAT UR: 626.83 MG/G CR (ref 0–17)
POTASSIUM SERPL-SCNC: 4.2 MMOL/L (ref 3.4–5.3)
SODIUM SERPL-SCNC: 137 MMOL/L (ref 135–145)

## 2024-12-23 PROCEDURE — 80048 BASIC METABOLIC PNL TOTAL CA: CPT

## 2024-12-23 PROCEDURE — 82043 UR ALBUMIN QUANTITATIVE: CPT

## 2024-12-23 PROCEDURE — 36415 COLL VENOUS BLD VENIPUNCTURE: CPT

## 2024-12-23 PROCEDURE — 82570 ASSAY OF URINE CREATININE: CPT

## 2024-12-23 NOTE — PROGRESS NOTES
Note to self :patient completed labs last week.  Urine albumin is very high.  Consider increasing Jardiance to 25 mg

## 2024-12-30 ENCOUNTER — OFFICE VISIT (OUTPATIENT)
Dept: FAMILY MEDICINE | Facility: CLINIC | Age: 69
End: 2024-12-30
Payer: COMMERCIAL

## 2024-12-30 VITALS
SYSTOLIC BLOOD PRESSURE: 136 MMHG | OXYGEN SATURATION: 95 % | WEIGHT: 177.1 LBS | TEMPERATURE: 98 F | BODY MASS INDEX: 27.8 KG/M2 | HEIGHT: 67 IN | HEART RATE: 62 BPM | RESPIRATION RATE: 18 BRPM | DIASTOLIC BLOOD PRESSURE: 60 MMHG

## 2024-12-30 DIAGNOSIS — F51.01 PRIMARY INSOMNIA: ICD-10-CM

## 2024-12-30 DIAGNOSIS — H43.392 FLOATERS IN VISUAL FIELD, LEFT: ICD-10-CM

## 2024-12-30 DIAGNOSIS — G89.29 CHRONIC PAIN OF BOTH SHOULDERS: ICD-10-CM

## 2024-12-30 DIAGNOSIS — H43.392 VITREOUS FLOATERS OF LEFT EYE: Primary | ICD-10-CM

## 2024-12-30 DIAGNOSIS — N18.31 STAGE 3A CHRONIC KIDNEY DISEASE (H): ICD-10-CM

## 2024-12-30 DIAGNOSIS — M17.0 PRIMARY OSTEOARTHRITIS OF BOTH KNEES: ICD-10-CM

## 2024-12-30 DIAGNOSIS — F17.210 CIGARETTE NICOTINE DEPENDENCE WITHOUT COMPLICATION: ICD-10-CM

## 2024-12-30 DIAGNOSIS — M25.512 CHRONIC PAIN OF BOTH SHOULDERS: ICD-10-CM

## 2024-12-30 DIAGNOSIS — F41.1 GAD (GENERALIZED ANXIETY DISORDER): ICD-10-CM

## 2024-12-30 DIAGNOSIS — M25.511 CHRONIC PAIN OF BOTH SHOULDERS: ICD-10-CM

## 2024-12-30 PROCEDURE — 99214 OFFICE O/P EST MOD 30 MIN: CPT | Performed by: NURSE PRACTITIONER

## 2024-12-30 PROCEDURE — G2211 COMPLEX E/M VISIT ADD ON: HCPCS | Performed by: NURSE PRACTITIONER

## 2024-12-30 RX ORDER — ESZOPICLONE 1 MG/1
1 TABLET, FILM COATED ORAL AT BEDTIME
Qty: 15 TABLET | Refills: 0 | Status: SHIPPED | OUTPATIENT
Start: 2024-12-30

## 2024-12-30 RX ORDER — SERTRALINE HYDROCHLORIDE 100 MG/1
200 TABLET, FILM COATED ORAL DAILY
Qty: 180 TABLET | Refills: 4 | Status: SHIPPED | OUTPATIENT
Start: 2024-12-30

## 2024-12-30 NOTE — PROGRESS NOTES
Assessment & Plan     Stage 3a chronic kidney disease (H)  On jardiance 10mg - SGLT - AVS print out shows copay is $150 - pt expresses this is too expensive  -referral placed for MTM to see if there is a coupon or program that can help  Goal for DM2 and HTN control - low ETOH and low NSAID use  -monitor every 6 months  We did see a reduction in Microalbumin  Creat is steady at 1.36, GFR 56  - empagliflozin (JARDIANCE) 10 MG TABS tablet  Dispense: 90 tablet; Refill: 1  - Comprehensive metabolic panel (BMP + Alb, Alk Phos, ALT, AST, Total. Bili, TP)    Benign essential hypertension  Cont propanolol, loasrtan 50mg, and amlodipine 10mg, decrease hydrochlorothiazide to 12.5mg June 2024  Blood pressure stable with this med regimen blood pressure today 136/60  Lifestyle change  Low salt  Lower ETOH intake  Quit smoking  - hydrochlorothiazide 12.5 MG tablet  Dispense: 90 tablet; Refill: 0  - Comprehensive metabolic panel (BMP + Alb, Alk Phos, ALT, AST, Total. Bili, TP)    Easy bruising -reported 6/20/2024  CBC normal last month (5/2024)  Bruising seen on BL forearm from dog nails  Will check clotting factors  Denies melena or tarry stools  PT INR normal 6/2024    Chronic pain of both shoulders  Referral placed to sports medicine patient would like shoulder injections likely with bilateral osteoarthritis given reduced range of motion and no acute trauma  No NSAIDs for daily pain -may take acetaminophen 1000 mg every 8 hours  - Orthopedic  Referral; Future     Primary osteoarthritis of both knees  Previously had knee injections last year which did not help him.  He does not want a total knee replacement felt this would be the best option for him and his symptoms  Patient to discuss with Ortho as far as other options outside of injections and surgery  Discussed range of motion exercises  Heat and over-the-counter analgesics such as Tylenol -patient to not take ibuprofen or NSAID for pain given his kidney disease  -  Orthopedic  Referral; Future  -DMV form completed for handicap placard -good through December 2028      JOHANA (generalized anxiety disorder)  Patient previously taking sertraline 150 but did see an improvement of his anxiety when he increased to 200 daily -refilling at this amount  - sertraline (ZOLOFT) 100 MG tablet; Take 2 tablets (200 mg) by mouth daily.  Dispense: 180 tablet; Refill: 4    Cigarette nicotine dependence without complication  Patient currently smokes half pack a day -has been smoking for the last 15+years rolls his own cigarettes  Last CT scan was in 2022.  Patient agrees to CT scan today  -Recommend quitting smoking today that this will help reduce his risk factors for heart attack and stroke  -Extensive time spent on risk and benefits related to low-dose CT scan  Shared decision making occurred when deciding on lung cancer screening versus risk of no screening.  Discussed risk of false positives and possibility of need for future bronchoscopy or imaging studies for further workup related to abnormal findings.  Patient verbalized understanding and agrees with plan of care.  Patient would like to follow through with CT screenings  - CT Chest Lung Cancer Screen Low Dose Without; Future     Primary insomnia  No improvement seen with trazodone, no improvement of symptoms with Ambien though this was helpful initially, we will do a trial of Lunesta, consider doxepin in the future  - eszopiclone (LUNESTA) 1 MG tablet; Take 1 tablet (1 mg) by mouth at bedtime. As needed for sleep  Dispense: 15 tablet; Refill: 0    Floaters in visual field, left  Patient with new symptoms over the last month of left eye black floater in his visual field, off-and-on throughout the day, Patient does have rare occasional flashes -denies missing blood pressure medication or other medications  Referral placed ophthalmology    - Adult Eye  Referral; Future      The longitudinal plan of care for the  diagnosis(es)/condition(s) as documented were addressed during this visit. Due to the added complexity in care, I will continue to support Ming in the subsequent management and with ongoing continuity of care.          Subjective   Ming is a 68 year old, presenting for the following health issues:  Hypertension (F/u)        6/26/2024    11:50 AM   Additional Questions   Roomed by MARY ELLEN-LPN   Accompanied by NONE     History of Present Illness           Last seen 5/22/24:  Going through finances around his house and arguing with family and depression and stress has been high - would like to increase the depression medication    Sertraline was increased to 150mg (1.5 tabs) last visit - doing better with this dose  -having a hard time sleeping   Ambien a couple a times/week - needs refills  - taking daily - feels calmer - less anxiety   Trazodone doesn't work as well.   12/30/24 - Ambien not helpful   Sertraline 200mg now - doing well with this - denies depression/anxiety symptoms.       Smoking 1/2 PPD - started smoking 15 years ago       Naps every day - about an hour    Has a dog and the dog jumps on him and causes bruising - lab pitbull    Drinking afternoon 2 beers/day - not at night - it causes him to urinate more.   wake up 4-5 times/night to urinate - hx of prostate surgery. Never worked.   - has been given oxybutynin in the past - no longer taking this.     ED - long standing - not bothering him right now    Ibuprofen 600mg in the morning - for BL shoulder pain -   Bl knee OA - severe - suggest Bl TKR in both knees -by ortho - pt doesn't want to do this.     CKD: creatinine baseline 1.4 (1.37, 1.41, 1.40, 1.23, 1.36)  Open to starting jardiance  No regular NSAID use  Lab Results   Component Value Date    A1C 5.4 05/22/2024    A1C 5.5 11/08/2022    A1C 5.7 02/02/2022    A1C 5.4 11/17/2017     BP Readings from Last 5 Encounters:   12/30/24 136/60   06/26/24 108/70   05/22/24 112/72   03/25/24 128/82   10/23/23  "137/81   ]  HTN: denies dizziness  Blood pressure today 136/60 denies dizziness, headache, chest pain, lower extremity edema  Taking losartan 50 mg he did decrease his hydrochlorothiazide to 12.5 mg after last visit.        5/22/2024     8:15 AM   ACT Total Scores   ACT TOTAL SCORE (Goal Greater than or Equal to 20) 25   In the past 12 months, how many times did you visit the emergency room for your asthma without being admitted to the hospital? 0   In the past 12 months, how many times were you hospitalized overnight because of your asthma? 0           Review of Systems  Constitutional, HEENT, cardiovascular, pulmonary, gi and gu systems are negative, except as otherwise noted.      Objective    /60 (BP Location: Right arm, Patient Position: Sitting, Cuff Size: Adult Regular)   Pulse 62   Temp 98  F (36.7  C) (Oral)   Resp 18   Ht 1.697 m (5' 6.81\")   Wt 80.3 kg (177 lb 1.6 oz)   SpO2 95%   BMI 27.89 kg/m    Body mass index is 27.89 kg/m .  Physical Exam   GENERAL: alert and no distress  DERM: multiple areas of bruising on forearms - appears as purpura  NECK: no adenopathy, no asymmetry, masses, or scars  RESP: lungs clear to auscultation - no rales, rhonchi or wheezes  CV: regular rate and rhythm, normal S1 S2, no S3 or S4, no murmur, click or rub, no peripheral edema  ABDOMEN: soft, nontender, no hepatosplenomegaly, no masses and bowel sounds normal  MS: Significant bowing of his legs -bilateral varum, reduced hip extension, bilateral shoulders with reduced range of motion of the internal and external rotation, equal left to right flexion and extension of the shoulders, crepitus palpated with no significant swelling or edema of the joint itself            Signed Electronically by: CORNELIO Bear CNP    "

## 2024-12-31 ENCOUNTER — PATIENT OUTREACH (OUTPATIENT)
Dept: CARE COORDINATION | Facility: CLINIC | Age: 69
End: 2024-12-31
Payer: COMMERCIAL

## 2024-12-31 ENCOUNTER — TELEPHONE (OUTPATIENT)
Dept: FAMILY MEDICINE | Facility: CLINIC | Age: 69
End: 2024-12-31
Payer: COMMERCIAL

## 2024-12-31 NOTE — TELEPHONE ENCOUNTER
LM - called pt to confirm that he got copy of application for disability parking certificate before leaving the office yesterday. Told pt if he did not get copy for it, to call back and ask us to leave the copy at the .   12/31/2024

## 2025-01-02 ENCOUNTER — PATIENT OUTREACH (OUTPATIENT)
Dept: CARE COORDINATION | Facility: CLINIC | Age: 70
End: 2025-01-02
Payer: COMMERCIAL

## 2025-01-02 ENCOUNTER — TELEPHONE (OUTPATIENT)
Dept: FAMILY MEDICINE | Facility: CLINIC | Age: 70
End: 2025-01-02
Payer: COMMERCIAL

## 2025-01-02 NOTE — TELEPHONE ENCOUNTER
MTM referral from: [facility] visit (referral by provider)    MTM referral outreach attempt #2 on January 2, 2025 at 11:55 AM      Outcome: Patient not reachable after several attempts, routed to Pharmacist Team/Provider as an FYI    Use [facility] med adv for the carrier/Plan on the flowsheet      MTM Practitioner please send patient letter    See Shan  [facility]   [phone]

## 2025-01-02 NOTE — LETTER
January 2, 2025      Ming Garnett  [address] [place] [address]        Dear Dr. Nicol Lai has recommended you schedule a Medication Therapy Management (MTM) appointment. MTM is designed to help you get the most of out of your medicines.     During an MTM appointment a specially trained pharmacist will review all of your medicines, both prescription and over-the-counter. They will make sure your medicines are the best choice for you and are safe and convenient for you.  MTM pharmacists work together with you and your doctor to help you understand your medicines, solve any problems related to your medicines and help you get the best results from taking your medicines.     At [facility] we strongly believe in a team approach to health care. We want to help you understand your medicines and health conditions. To learn more about how you might benefit from MTM services, check out the following website:    [email or web address]    To make an appointment, please call the MTM scheduling line at [phone].    We look forward to hearing from you!      Meli Mae, PharmD  Medication Therapy Management (MTM) Pharmacist

## 2025-02-06 ENCOUNTER — TELEPHONE (OUTPATIENT)
Dept: OPHTHALMOLOGY | Facility: CLINIC | Age: 70
End: 2025-02-06
Payer: COMMERCIAL

## 2025-02-06 NOTE — TELEPHONE ENCOUNTER
[facility] Call Center    Phone Message    May a detailed message be left on voicemail: yes     Reason for Call: Symptoms or Concerns     If patient has red-flag symptoms, warm transfer to triage line    Current symptom or concern: Floater left eye.    Symptoms have been present for:  6 week(s)    Has patient previously been seen for this? No    By :     Date:     Are there any new or worsening symptoms? No, vision gets blurry sometimes.    Action Taken: Message routed to:  [facility] Ophthalmology    Travel Screening: Not Applicable     Date of Service:

## 2025-02-06 NOTE — TELEPHONE ENCOUNTER
[phone] mobile    Called twice at [phone] and left message with direct number.    Home number: [phone] not in service times two.    Quintin aMhan RN 4:38 PM 02/06/25

## 2025-02-17 DIAGNOSIS — I10 BENIGN ESSENTIAL HYPERTENSION: ICD-10-CM

## 2025-02-17 RX ORDER — LOSARTAN POTASSIUM 50 MG/1
TABLET ORAL
Qty: 90 TABLET | Refills: 4 | Status: SHIPPED | OUTPATIENT
Start: 2025-02-17

## 2025-02-26 ENCOUNTER — TELEPHONE (OUTPATIENT)
Dept: OPHTHALMOLOGY | Facility: CLINIC | Age: 70
End: 2025-02-26
Payer: COMMERCIAL

## 2025-02-26 NOTE — TELEPHONE ENCOUNTER
Left voice mail.  Patient may call and schedule with first available Optometry clinic or community clinic through scheduling.      I asked patient to call triage number if symptoms have changed or worsened.  Patient first called three weeks ago and we were unable to reach patient.

## 2025-04-05 DIAGNOSIS — Z91.89 FRAMINGHAM CARDIAC RISK >20% IN NEXT 10 YEARS: ICD-10-CM

## 2025-04-07 RX ORDER — ATORVASTATIN CALCIUM 40 MG/1
40 TABLET, FILM COATED ORAL DAILY
Qty: 90 TABLET | Refills: 1 | Status: SHIPPED | OUTPATIENT
Start: 2025-04-07

## 2025-04-22 ENCOUNTER — PATIENT OUTREACH (OUTPATIENT)
Dept: CARE COORDINATION | Facility: CLINIC | Age: 70
End: 2025-04-22
Payer: COMMERCIAL

## 2025-05-06 ENCOUNTER — PATIENT OUTREACH (OUTPATIENT)
Dept: CARE COORDINATION | Facility: CLINIC | Age: 70
End: 2025-05-06
Payer: COMMERCIAL

## 2025-05-27 NOTE — TELEPHONE ENCOUNTER
Chief Complaint   Patient presents with   • Cough   • URI   • Office Visit          Health Maintenance     Diabetes Foot Exam (Yearly)  Overdue since 11/4/2023    Hepatitis B Vaccine (1 of 3 - 19+ 3-dose series)  Never done    Pneumococcal Vaccine 50+ (1 of 2 - PCV)  Never done    Shingles Vaccine (1 of 2)  Never done    COVID-19 Vaccine (1 - 2024-25 season)  Never done         Following review of the above:  Patient is not proceeding with: COVID-19, Hep B, Pneumococcal, and Shingles    Note: Refer to final orders and clinician documentation.          Review Flowsheet  More data exists       5/28/2025   PHQ 2/9 Score   Adult PHQ 2 Score 0   Adult PHQ 2 Interpretation No further screening needed   Little interest or pleasure in activity? Not at all   Feeling down, depressed or hopeless? Not at all            Cisco Jerome MD   Please review concern from pharmacy below     Thank you   Nichelle Griffin Registered Nurse   [facility], [place]

## (undated) DEVICE — PAD CHUX UNDERPAD 30X36" P3036C

## (undated) DEVICE — SYR PISTON IRRIGATION 60 ML DYND20325

## (undated) DEVICE — GLOVE BIOGEL PI MICRO SZ 7.0 48570

## (undated) DEVICE — LINEN GOWN X4 5410

## (undated) DEVICE — SOL WATER IRRIG 1000ML BOTTLE 2F7114

## (undated) DEVICE — SOL NACL 0.9% IRRIG 3000ML BAG 2B7477

## (undated) DEVICE — LINEN TOWEL PACK X5 5464

## (undated) DEVICE — DRAPE MAYO STAND 23X54 8337

## (undated) DEVICE — TAPE DURAPORE 3" SILK 1538-3

## (undated) DEVICE — DRSG GAUZE 4X8" NON21842

## (undated) DEVICE — CATH LASER URETERAL 7.1FRX40CM G17797  022403-7.1-40

## (undated) DEVICE — LASER FIBER HOLMIUM MOSES 550 D/F/L AC-10030120

## (undated) DEVICE — BAG URINARY DRAIN 4000ML LF 153509

## (undated) DEVICE — STRAP KNEE/BODY 31143004

## (undated) DEVICE — SOL NACL 0.9% IRRIG 1000ML BOTTLE 2F7124

## (undated) DEVICE — TUBING SET THERMEDX UROLOGY SGL USE LL0006

## (undated) DEVICE — Device

## (undated) DEVICE — FILTER PIRANHA DISP 2228.901

## (undated) DEVICE — TUBING SUCTION MEDI-VAC 1/4"X20' N620A

## (undated) DEVICE — SYR 50ML LL W/O NDL 309653

## (undated) DEVICE — SPECIMEN TRAP TISSUE CONTAINER PIRANHA 2208120

## (undated) DEVICE — CATH FOLEY 3WAY 22FR 30ML LATEX 0167SI22

## (undated) DEVICE — BLADE MORCELLATOR WOLF PIRANHA 4.75X385MM 49700103

## (undated) DEVICE — SUCTION MANIFOLD NEPTUNE 2 SYS 4 PORT 0702-020-000

## (undated) DEVICE — PAD FLOOR SURGSAFE 30X40" 83030

## (undated) DEVICE — PREP DYNA-HEX 4% CHG SCRUB 4OZ BOTTLE MDS098710

## (undated) DEVICE — TUBING SET PIRANHA 41702208

## (undated) RX ORDER — LIDOCAINE HYDROCHLORIDE 10 MG/ML
INJECTION, SOLUTION EPIDURAL; INFILTRATION; INTRACAUDAL; PERINEURAL
Status: DISPENSED
Start: 2023-06-01

## (undated) RX ORDER — EPHEDRINE SULFATE 50 MG/ML
INJECTION, SOLUTION INTRAMUSCULAR; INTRAVENOUS; SUBCUTANEOUS
Status: DISPENSED
Start: 2022-11-23

## (undated) RX ORDER — LIDOCAINE HYDROCHLORIDE 20 MG/ML
SOLUTION OROPHARYNGEAL
Status: DISPENSED
Start: 2020-02-03

## (undated) RX ORDER — TRIAMCINOLONE ACETONIDE 40 MG/ML
INJECTION, SUSPENSION INTRA-ARTICULAR; INTRAMUSCULAR
Status: DISPENSED
Start: 2023-06-01

## (undated) RX ORDER — ACETAMINOPHEN 325 MG/1
TABLET ORAL
Status: DISPENSED
Start: 2022-11-23

## (undated) RX ORDER — HYDROMORPHONE HCL IN WATER/PF 6 MG/30 ML
PATIENT CONTROLLED ANALGESIA SYRINGE INTRAVENOUS
Status: DISPENSED
Start: 2022-11-23

## (undated) RX ORDER — FENTANYL CITRATE 50 UG/ML
INJECTION, SOLUTION INTRAMUSCULAR; INTRAVENOUS
Status: DISPENSED
Start: 2022-11-23